# Patient Record
Sex: FEMALE | Race: WHITE | NOT HISPANIC OR LATINO | Employment: OTHER | URBAN - METROPOLITAN AREA
[De-identification: names, ages, dates, MRNs, and addresses within clinical notes are randomized per-mention and may not be internally consistent; named-entity substitution may affect disease eponyms.]

---

## 2023-10-16 PROBLEM — M19.011 PRIMARY OSTEOARTHRITIS OF RIGHT SHOULDER: Status: ACTIVE | Noted: 2023-10-16

## 2023-10-16 PROBLEM — R49.0 DYSPHONIA: Status: ACTIVE | Noted: 2023-10-16

## 2023-10-16 PROBLEM — I50.32 CHRONIC DIASTOLIC CONGESTIVE HEART FAILURE (MULTI): Status: ACTIVE | Noted: 2023-10-16

## 2023-10-16 PROBLEM — I10 BENIGN ESSENTIAL HTN: Status: ACTIVE | Noted: 2023-10-16

## 2023-10-16 PROBLEM — R29.898 WEAKNESS OF BOTH HIPS: Status: ACTIVE | Noted: 2023-10-16

## 2023-10-16 PROBLEM — E03.9 HYPOTHYROIDISM: Status: ACTIVE | Noted: 2023-10-16

## 2023-10-16 PROBLEM — M19.019 LOCALIZED, PRIMARY OSTEOARTHRITIS OF SHOULDER REGION: Status: ACTIVE | Noted: 2023-10-16

## 2023-10-16 PROBLEM — M75.101 ROTATOR CUFF TEAR ARTHROPATHY OF RIGHT SHOULDER: Status: ACTIVE | Noted: 2023-10-16

## 2023-10-16 PROBLEM — H57.9 EYE PROBLEM: Status: ACTIVE | Noted: 2023-10-16

## 2023-10-16 PROBLEM — M12.811 ROTATOR CUFF TEAR ARTHROPATHY OF RIGHT SHOULDER: Status: ACTIVE | Noted: 2023-10-16

## 2023-10-16 PROBLEM — I05.0 MITRAL VALVE STENOSIS: Status: ACTIVE | Noted: 2023-10-16

## 2023-10-16 PROBLEM — I27.20 PULMONARY HYPERTENSION (MULTI): Status: ACTIVE | Noted: 2023-10-16

## 2023-10-16 PROBLEM — J44.9 CHRONIC OBSTRUCTIVE PULMONARY DISEASE (MULTI): Status: ACTIVE | Noted: 2023-10-16

## 2023-10-16 PROBLEM — Z95.2 S/P TAVR (TRANSCATHETER AORTIC VALVE REPLACEMENT): Status: ACTIVE | Noted: 2023-10-16

## 2023-10-16 PROBLEM — I51.9 HEART DISEASE: Status: ACTIVE | Noted: 2023-10-16

## 2023-10-16 PROBLEM — Z87.891 FORMER SMOKER: Status: ACTIVE | Noted: 2023-10-16

## 2023-10-16 PROBLEM — I35.0 AORTIC VALVE STENOSIS, SEVERE: Status: ACTIVE | Noted: 2023-10-16

## 2023-10-16 PROBLEM — J45.909 ASTHMA (HHS-HCC): Status: ACTIVE | Noted: 2023-10-16

## 2023-10-16 PROBLEM — M19.90 GENERALIZED ARTHRITIS: Status: ACTIVE | Noted: 2023-10-16

## 2023-10-16 PROBLEM — E78.2 MIXED HYPERLIPIDEMIA: Status: ACTIVE | Noted: 2023-10-16

## 2023-10-16 PROBLEM — M97.9XXD: Status: ACTIVE | Noted: 2023-10-16

## 2023-10-16 PROBLEM — I25.10 CAD (CORONARY ARTERY DISEASE): Status: ACTIVE | Noted: 2023-10-16

## 2023-10-16 PROBLEM — R26.89 BALANCE PROBLEM: Status: ACTIVE | Noted: 2023-10-16

## 2023-10-16 RX ORDER — NITROGLYCERIN 0.4 MG/1
1 TABLET SUBLINGUAL EVERY 5 MIN PRN
COMMUNITY
Start: 2015-10-01

## 2023-10-16 RX ORDER — MECLIZINE HCL 12.5 MG 12.5 MG/1
1 TABLET ORAL 3 TIMES DAILY PRN
COMMUNITY

## 2023-10-16 RX ORDER — ALBUTEROL SULFATE 90 UG/1
AEROSOL, METERED RESPIRATORY (INHALATION)
COMMUNITY
Start: 2022-11-14 | End: 2024-04-16 | Stop reason: ENTERED-IN-ERROR

## 2023-10-16 RX ORDER — NAPROXEN SODIUM 220 MG/1
1 TABLET, FILM COATED ORAL DAILY
COMMUNITY
End: 2024-04-24 | Stop reason: HOSPADM

## 2023-10-16 RX ORDER — LOSARTAN POTASSIUM 50 MG/1
1 TABLET ORAL NIGHTLY
COMMUNITY
Start: 2022-09-13

## 2023-10-16 RX ORDER — METHOCARBAMOL 500 MG/1
TABLET, FILM COATED ORAL
COMMUNITY
Start: 2022-03-10 | End: 2024-04-16 | Stop reason: ENTERED-IN-ERROR

## 2023-10-16 RX ORDER — POLYETHYLENE GLYCOL 3350 17 G/17G
17 POWDER, FOR SOLUTION ORAL DAILY PRN
COMMUNITY

## 2023-10-16 RX ORDER — HYDROXYCHLOROQUINE SULFATE 200 MG/1
1 TABLET, FILM COATED ORAL DAILY
COMMUNITY
Start: 2015-10-01 | End: 2024-04-16 | Stop reason: ENTERED-IN-ERROR

## 2023-10-16 RX ORDER — BISACODYL 5 MG
1 TABLET, DELAYED RELEASE (ENTERIC COATED) ORAL AS NEEDED
COMMUNITY
Start: 2022-03-10

## 2023-10-16 RX ORDER — LEVOTHYROXINE SODIUM 75 UG/1
1 TABLET ORAL DAILY
COMMUNITY
End: 2024-04-16 | Stop reason: ENTERED-IN-ERROR

## 2023-10-16 RX ORDER — PRIMIDONE 50 MG/1
TABLET ORAL
COMMUNITY
Start: 2015-10-01 | End: 2024-04-16 | Stop reason: ENTERED-IN-ERROR

## 2023-10-16 RX ORDER — FLUTICASONE FUROATE AND VILANTEROL 100; 25 UG/1; UG/1
1 POWDER RESPIRATORY (INHALATION) DAILY
COMMUNITY
End: 2024-04-16 | Stop reason: ENTERED-IN-ERROR

## 2023-10-16 RX ORDER — MULTIVIT-MIN/IRON/FOLIC ACID/K 18-600-40
1 CAPSULE ORAL DAILY
COMMUNITY
End: 2024-04-19 | Stop reason: ENTERED-IN-ERROR

## 2023-10-16 RX ORDER — ATORVASTATIN CALCIUM 20 MG/1
1 TABLET, FILM COATED ORAL NIGHTLY
COMMUNITY
Start: 2015-10-01

## 2023-10-18 ENCOUNTER — CLINICAL SUPPORT (OUTPATIENT)
Dept: ORTHOPEDIC SURGERY | Facility: CLINIC | Age: 88
End: 2023-10-18
Payer: MEDICARE

## 2023-10-18 ENCOUNTER — OFFICE VISIT (OUTPATIENT)
Dept: ORTHOPEDIC SURGERY | Facility: CLINIC | Age: 88
End: 2023-10-18
Payer: MEDICARE

## 2023-10-18 VITALS — WEIGHT: 120 LBS | BODY MASS INDEX: 21.26 KG/M2 | HEIGHT: 63 IN

## 2023-10-18 DIAGNOSIS — M19.019 LOCALIZED, PRIMARY OSTEOARTHRITIS OF SHOULDER REGION, UNSPECIFIED LATERALITY: ICD-10-CM

## 2023-10-18 DIAGNOSIS — M19.019 LOCALIZED, PRIMARY OSTEOARTHRITIS OF SHOULDER REGION, UNSPECIFIED LATERALITY: Primary | ICD-10-CM

## 2023-10-18 PROCEDURE — 1036F TOBACCO NON-USER: CPT | Performed by: ORTHOPAEDIC SURGERY

## 2023-10-18 PROCEDURE — 99214 OFFICE O/P EST MOD 30 MIN: CPT | Performed by: ORTHOPAEDIC SURGERY

## 2023-10-18 PROCEDURE — 73030 X-RAY EXAM OF SHOULDER: CPT | Mod: BILATERAL PROCEDURE | Performed by: ORTHOPAEDIC SURGERY

## 2023-10-18 PROCEDURE — 1159F MED LIST DOCD IN RCRD: CPT | Performed by: ORTHOPAEDIC SURGERY

## 2023-10-18 PROCEDURE — 1125F AMNT PAIN NOTED PAIN PRSNT: CPT | Performed by: ORTHOPAEDIC SURGERY

## 2023-10-18 PROCEDURE — 20610 DRAIN/INJ JOINT/BURSA W/O US: CPT | Performed by: ORTHOPAEDIC SURGERY

## 2023-10-18 ASSESSMENT — PAIN SCALES - GENERAL: PAINLEVEL_OUTOF10: 6

## 2023-10-18 ASSESSMENT — PAIN - FUNCTIONAL ASSESSMENT: PAIN_FUNCTIONAL_ASSESSMENT: 0-10

## 2023-10-18 NOTE — PROGRESS NOTES
History of present: Patient is well-known to our practice we have not seen her for her shoulders for at least 3 to 4 years she says she has not had injections in any recent memory so we got new x-rays    She is got multiple arthritic changes she is a nonsurgical candidate        Past medical history:    The patient's past medical history, family history, social history and review of systems were documented on the patient's medical intake form.  The medical intake form was reviewed and scanned into the electronic medical record for future use.  History is otherwise negative except as stated in the history of present illness.    Physical exam:    General: Alert and oriented to person place and time.  No acute distress and breathing comfortably, pleasant and cooperative with examination.    Head and neck exam: Head is normocephalic atraumatic.    Neck: Supple no visible swelling or deformity.    Cardiovascular: Good perfusion to affected extremities without signs or symptoms of chest pain.    Lungs no audible wheezing or labored breathing on examination.    Abdominal exam: Nondistended nontender    Extremities: The right shoulder was inspected and was found to have no obvious deformity.  There was tenderness to touch over the lateral edges of the shoulder over the rotator cuff insertion.  Active forward flexion 120 degrees, external rotation to 60 degrees, abduction to 50 degrees, and internal rotation to the level of L2.    At this time the shoulder is neurovascular tact and neurosensory intact.  Motor intact C5-T1.  There was no obvious neck pain or radiculopathy noted.  There was no gross instability or adhesive capsulitis symptoms.  There was no evidence of apprehension or apprehension suppression.    Strength was tested in 4 planes with weakness in the supraspinatus strength testing and external rotation position.  There was no strength deficit in internal rotation.  Impingement signs were positive both supine and  standing for impingement test type I and II.  There was mild pain over the bicipital groove with a positive speeds sign    Before aspiration injection the benefits of a cortisone injection including infection, local skin irritation, skin atrophy, calcification, continued pain and discomfort, elevated blood sugar, burning, failure to relieve pain, possible late infection were discussed with the patient.    Postprocedure discomfort can be alleviated with additional medications, ice, elevation, rest over the first 24 hours as recommended.    Patient verbalized understanding and wanted to proceed with the planned procedure.    After informed consent was provided and allergies verified, the patient was positioned appropriately on thel bed.  The right shoulder to be aspirated and injected was prepped and draped in a sterile fashion.  The skin was anesthetized with ethyl chloride spray.  A joint aspiration was to be performed an 18-gauge needle was used otherwise a 22-gauge needle was used to inject the appropriate joint.    Joint injection was performed with a mixture of 5 cc 1% lidocaine plain and 2 cc Celestone Soluspan 6 mg per mL.  The needle was removed and the puncture site closed and sealed with a Band-Aid.  The patient tolerated the procedure well.      The left shoulder was inspected and was found to have no obvious deformity.  There was tenderness to touch over the lateral edges of the shoulder over the rotator cuff insertion.  Active forward flexion 120 degrees, external rotation to 60 degrees, abduction to 45 degrees, and internal rotation to the level of L2.    At this time the shoulder is neurovascular tact and neurosensory intact.  Motor intact C5-T1.  There was no obvious neck pain or radiculopathy noted.  There was no gross instability or adhesive capsulitis symptoms.  There was no evidence of apprehension or apprehension suppression.    Strength was tested in 4 planes with weakness in the supraspinatus  strength testing and external rotation position.  There was no strength deficit in internal rotation.  Impingement signs were positive both supine and standing for impingement test type I and II.  There was mild pain over the bicipital groove with a positive speeds sign    Before aspiration injection the benefits of a cortisone injection including infection, local skin irritation, skin atrophy, calcification, continued pain and discomfort, elevated blood sugar, burning, failure to relieve pain, possible late infection were discussed with the patient.    Postprocedure discomfort can be alleviated with additional medications, ice, elevation, rest over the first 24 hours as recommended.    Patient verbalized understanding and wanted to proceed with the planned procedure.    After informed consent was provided and allergies verified, the patient was positioned appropriately on thel bed.  The left shoulder to be aspirated and injected was prepped and draped in a sterile fashion.  The skin was anesthetized with ethyl chloride spray.  A joint aspiration was to be performed an 18-gauge needle was used otherwise a 22-gauge needle was used to inject the appropriate joint.    Joint injection was performed with a mixture of 5 cc 1% lidocaine plain and 2 cc Celestone Soluspan 6 mg per mL.  The needle was removed and the puncture site closed and sealed with a Band-Aid.  The patient tolerated the procedure well.      Diagnostic studies: Bilateral rotator cuff arthropathy seen on x-rays      Impression: Bilateral rotator cuff arthropathy      Plan: Simple injections    Moist heat ice what ever makes her comfortable    Avoid heavy lifting    When she does lift ergonomically she should keep her elbows up against her rib cage    Fortunately she can still do ADLs she does not get too much anterior escape the right is a little worse than left but both shoulders respond nicely to injections we will see her back as needed

## 2024-04-16 ENCOUNTER — APPOINTMENT (OUTPATIENT)
Dept: RADIOLOGY | Facility: HOSPITAL | Age: 89
DRG: 536 | End: 2024-04-16
Payer: MEDICARE

## 2024-04-16 ENCOUNTER — APPOINTMENT (OUTPATIENT)
Dept: CARDIOLOGY | Facility: HOSPITAL | Age: 89
DRG: 536 | End: 2024-04-16
Payer: MEDICARE

## 2024-04-16 ENCOUNTER — HOSPITAL ENCOUNTER (INPATIENT)
Facility: HOSPITAL | Age: 89
LOS: 1 days | Discharge: NURSING, REHAB OR LONG-TERM CARE | DRG: 536 | End: 2024-04-17
Attending: STUDENT IN AN ORGANIZED HEALTH CARE EDUCATION/TRAINING PROGRAM | Admitting: HOSPITALIST
Payer: MEDICARE

## 2024-04-16 DIAGNOSIS — Z96.649 PERIPROSTHETIC FRACTURE OF HIP, INITIAL ENCOUNTER: ICD-10-CM
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
## 2024-04-16 DIAGNOSIS — M97.8XXA PERIPROSTHETIC FRACTURE OF HIP, INITIAL ENCOUNTER: ICD-10-CM

## 2024-04-16 LAB
ALBUMIN SERPL BCP-MCNC: 3.7 G/DL (ref 3.4–5)
ALP SERPL-CCNC: 52 U/L (ref 33–136)
ALT SERPL W P-5'-P-CCNC: 14 U/L (ref 7–45)
ANION GAP SERPL CALC-SCNC: 10 MMOL/L (ref 10–20)
APTT PPP: 32 SECONDS (ref 27–38)
AST SERPL W P-5'-P-CCNC: 18 U/L (ref 9–39)
ATRIAL RATE: 70 BPM
BASOPHILS # BLD AUTO: 0.03 X10*3/UL (ref 0–0.1)
BASOPHILS NFR BLD AUTO: 0.3 %
BILIRUB SERPL-MCNC: 0.6 MG/DL (ref 0–1.2)
BUN SERPL-MCNC: 28 MG/DL (ref 6–23)
CALCIUM SERPL-MCNC: 8.9 MG/DL (ref 8.6–10.3)
CARDIAC TROPONIN I PNL SERPL HS: 8 NG/L (ref 0–13)
CHLORIDE SERPL-SCNC: 106 MMOL/L (ref 98–107)
CO2 SERPL-SCNC: 27 MMOL/L (ref 21–32)
CREAT SERPL-MCNC: 0.82 MG/DL (ref 0.5–1.05)
EGFRCR SERPLBLD CKD-EPI 2021: 66 ML/MIN/1.73M*2
EOSINOPHIL # BLD AUTO: 0.13 X10*3/UL (ref 0–0.4)
EOSINOPHIL NFR BLD AUTO: 1.4 %
ERYTHROCYTE [DISTWIDTH] IN BLOOD BY AUTOMATED COUNT: 13.7 % (ref 11.5–14.5)
GLUCOSE SERPL-MCNC: 112 MG/DL (ref 74–99)
HCT VFR BLD AUTO: 31.2 % (ref 36–46)
HGB BLD-MCNC: 10 G/DL (ref 12–16)
IMM GRANULOCYTES # BLD AUTO: 0.05 X10*3/UL (ref 0–0.5)
IMM GRANULOCYTES NFR BLD AUTO: 0.5 % (ref 0–0.9)
INR PPP: 1 (ref 0.9–1.1)
LYMPHOCYTES # BLD AUTO: 0.73 X10*3/UL (ref 0.8–3)
LYMPHOCYTES NFR BLD AUTO: 7.8 %
MCH RBC QN AUTO: 27.4 PG (ref 26–34)
MCHC RBC AUTO-ENTMCNC: 32.1 G/DL (ref 32–36)
MCV RBC AUTO: 86 FL (ref 80–100)
MONOCYTES # BLD AUTO: 0.55 X10*3/UL (ref 0.05–0.8)
MONOCYTES NFR BLD AUTO: 5.9 %
NEUTROPHILS # BLD AUTO: 7.88 X10*3/UL (ref 1.6–5.5)
NEUTROPHILS NFR BLD AUTO: 84.1 %
NRBC BLD-RTO: 0 /100 WBCS (ref 0–0)
P OFFSET: 209 MS
P ONSET: 160 MS
PLATELET # BLD AUTO: 176 X10*3/UL (ref 150–450)
POTASSIUM SERPL-SCNC: 4.1 MMOL/L (ref 3.5–5.3)
PR INTERVAL: 130 MS
PROT SERPL-MCNC: 6.1 G/DL (ref 6.4–8.2)
PROTHROMBIN TIME: 11.4 SECONDS (ref 9.8–12.8)
Q ONSET: 225 MS
QRS COUNT: 11 BEATS
QRS DURATION: 76 MS
QT INTERVAL: 378 MS
QTC CALCULATION(BAZETT): 408 MS
QTC FREDERICIA: 398 MS
R AXIS: -23 DEGREES
RBC # BLD AUTO: 3.65 X10*6/UL (ref 4–5.2)
SODIUM SERPL-SCNC: 139 MMOL/L (ref 136–145)
T AXIS: 8 DEGREES
T OFFSET: 414 MS
VENTRICULAR RATE: 70 BPM
WBC # BLD AUTO: 9.4 X10*3/UL (ref 4.4–11.3)

## 2024-04-16 PROCEDURE — 72125 CT NECK SPINE W/O DYE: CPT

## 2024-04-16 PROCEDURE — 93005 ELECTROCARDIOGRAM TRACING: CPT

## 2024-04-16 PROCEDURE — 73552 X-RAY EXAM OF FEMUR 2/>: CPT | Performed by: RADIOLOGY

## 2024-04-16 PROCEDURE — 72125 CT NECK SPINE W/O DYE: CPT | Performed by: RADIOLOGY

## 2024-04-16 PROCEDURE — 72131 CT LUMBAR SPINE W/O DYE: CPT | Mod: RCN

## 2024-04-16 PROCEDURE — 80053 COMPREHEN METABOLIC PANEL: CPT | Performed by: STUDENT IN AN ORGANIZED HEALTH CARE EDUCATION/TRAINING PROGRAM

## 2024-04-16 PROCEDURE — 72170 X-RAY EXAM OF PELVIS: CPT | Performed by: RADIOLOGY

## 2024-04-16 PROCEDURE — 99221 1ST HOSP IP/OBS SF/LOW 40: CPT | Performed by: ORTHOPAEDIC SURGERY

## 2024-04-16 PROCEDURE — 73552 X-RAY EXAM OF FEMUR 2/>: CPT | Mod: RT

## 2024-04-16 PROCEDURE — 27246 TREAT THIGH FRACTURE: CPT | Performed by: ORTHOPAEDIC SURGERY

## 2024-04-16 PROCEDURE — 72170 X-RAY EXAM OF PELVIS: CPT

## 2024-04-16 PROCEDURE — 2060000001 HC INTERMEDIATE ICU ROOM DAILY

## 2024-04-16 PROCEDURE — 2500000004 HC RX 250 GENERAL PHARMACY W/ HCPCS (ALT 636 FOR OP/ED)

## 2024-04-16 PROCEDURE — 99285 EMERGENCY DEPT VISIT HI MDM: CPT | Mod: 25

## 2024-04-16 PROCEDURE — 73700 CT LOWER EXTREMITY W/O DYE: CPT | Mod: RT

## 2024-04-16 PROCEDURE — 84484 ASSAY OF TROPONIN QUANT: CPT | Performed by: STUDENT IN AN ORGANIZED HEALTH CARE EDUCATION/TRAINING PROGRAM

## 2024-04-16 PROCEDURE — 70450 CT HEAD/BRAIN W/O DYE: CPT | Performed by: RADIOLOGY

## 2024-04-16 PROCEDURE — 85730 THROMBOPLASTIN TIME PARTIAL: CPT | Performed by: STUDENT IN AN ORGANIZED HEALTH CARE EDUCATION/TRAINING PROGRAM

## 2024-04-16 PROCEDURE — 85025 COMPLETE CBC W/AUTO DIFF WBC: CPT | Performed by: STUDENT IN AN ORGANIZED HEALTH CARE EDUCATION/TRAINING PROGRAM

## 2024-04-16 PROCEDURE — 72128 CT CHEST SPINE W/O DYE: CPT | Mod: RCN

## 2024-04-16 PROCEDURE — 2500000001 HC RX 250 WO HCPCS SELF ADMINISTERED DRUGS (ALT 637 FOR MEDICARE OP): Performed by: NURSE PRACTITIONER

## 2024-04-16 PROCEDURE — 2550000001 HC RX 255 CONTRASTS: Performed by: STUDENT IN AN ORGANIZED HEALTH CARE EDUCATION/TRAINING PROGRAM

## 2024-04-16 PROCEDURE — 36415 COLL VENOUS BLD VENIPUNCTURE: CPT | Performed by: STUDENT IN AN ORGANIZED HEALTH CARE EDUCATION/TRAINING PROGRAM

## 2024-04-16 PROCEDURE — 99222 1ST HOSP IP/OBS MODERATE 55: CPT

## 2024-04-16 PROCEDURE — 85610 PROTHROMBIN TIME: CPT | Performed by: STUDENT IN AN ORGANIZED HEALTH CARE EDUCATION/TRAINING PROGRAM

## 2024-04-16 PROCEDURE — 74177 CT ABD & PELVIS W/CONTRAST: CPT

## 2024-04-16 PROCEDURE — 70450 CT HEAD/BRAIN W/O DYE: CPT

## 2024-04-16 RX ORDER — MECLIZINE HCL 12.5 MG 12.5 MG/1
12.5 TABLET ORAL 2 TIMES DAILY PRN
Status: DISCONTINUED | OUTPATIENT
Start: 2024-04-16 | End: 2024-04-17 | Stop reason: HOSPADM

## 2024-04-16 RX ORDER — ACETAMINOPHEN 160 MG/5ML
650 SOLUTION ORAL EVERY 4 HOURS PRN
Status: DISCONTINUED | OUTPATIENT
Start: 2024-04-16 | End: 2024-04-17 | Stop reason: HOSPADM

## 2024-04-16 RX ORDER — POLYETHYLENE GLYCOL 3350 17 G/17G
17 POWDER, FOR SOLUTION ORAL DAILY
Status: DISCONTINUED | OUTPATIENT
Start: 2024-04-16 | End: 2024-04-17 | Stop reason: HOSPADM

## 2024-04-16 RX ORDER — ACETAMINOPHEN 650 MG/1
650 SUPPOSITORY RECTAL EVERY 4 HOURS PRN
Status: DISCONTINUED | OUTPATIENT
Start: 2024-04-16 | End: 2024-04-17 | Stop reason: HOSPADM

## 2024-04-16 RX ORDER — ONDANSETRON HYDROCHLORIDE 2 MG/ML
4 INJECTION, SOLUTION INTRAVENOUS EVERY 8 HOURS PRN
Status: DISCONTINUED | OUTPATIENT
Start: 2024-04-16 | End: 2024-04-17 | Stop reason: HOSPADM

## 2024-04-16 RX ORDER — ONDANSETRON 4 MG/1
4 TABLET, FILM COATED ORAL EVERY 8 HOURS PRN
Status: DISCONTINUED | OUTPATIENT
Start: 2024-04-16 | End: 2024-04-17 | Stop reason: HOSPADM

## 2024-04-16 RX ORDER — FLUTICASONE FUROATE AND VILANTEROL 100; 25 UG/1; UG/1
1 POWDER RESPIRATORY (INHALATION)
Status: DISCONTINUED | OUTPATIENT
Start: 2024-04-17 | End: 2024-04-17 | Stop reason: HOSPADM

## 2024-04-16 RX ORDER — ATORVASTATIN CALCIUM 20 MG/1
20 TABLET, FILM COATED ORAL NIGHTLY
Status: DISCONTINUED | OUTPATIENT
Start: 2024-04-16 | End: 2024-04-17 | Stop reason: HOSPADM

## 2024-04-16 RX ORDER — LOSARTAN POTASSIUM 50 MG/1
50 TABLET ORAL NIGHTLY
Status: DISCONTINUED | OUTPATIENT
Start: 2024-04-16 | End: 2024-04-17 | Stop reason: HOSPADM

## 2024-04-16 RX ORDER — ACETAMINOPHEN 325 MG/1
975 TABLET ORAL ONCE
Status: COMPLETED | OUTPATIENT
Start: 2024-04-16 | End: 2024-04-16

## 2024-04-16 RX ORDER — ACETAMINOPHEN 325 MG/1
650 TABLET ORAL EVERY 4 HOURS PRN
Status: DISCONTINUED | OUTPATIENT
Start: 2024-04-16 | End: 2024-04-17 | Stop reason: HOSPADM

## 2024-04-16 RX ORDER — ENOXAPARIN SODIUM 100 MG/ML
40 INJECTION SUBCUTANEOUS EVERY 24 HOURS
Status: DISCONTINUED | OUTPATIENT
Start: 2024-04-16 | End: 2024-04-17

## 2024-04-16 RX ADMIN — IOHEXOL 75 ML: 350 INJECTION, SOLUTION INTRAVENOUS at 10:00

## 2024-04-16 RX ADMIN — ATORVASTATIN CALCIUM 20 MG: 20 TABLET, FILM COATED ORAL at 22:06

## 2024-04-16 RX ADMIN — POLYETHYLENE GLYCOL 3350 17 G: 17 POWDER, FOR SOLUTION ORAL at 20:28

## 2024-04-16 RX ADMIN — ACETAMINOPHEN 650 MG: 325 TABLET ORAL at 18:53

## 2024-04-16 RX ADMIN — LOSARTAN POTASSIUM 50 MG: 50 TABLET, FILM COATED ORAL at 22:06

## 2024-04-16 RX ADMIN — ACETAMINOPHEN 975 MG: 325 TABLET ORAL at 10:32

## 2024-04-16 RX ADMIN — ENOXAPARIN SODIUM 40 MG: 40 INJECTION SUBCUTANEOUS at 20:27

## 2024-04-16 SDOH — SOCIAL STABILITY: SOCIAL INSECURITY: HAS ANYONE EVER THREATENED TO HURT YOUR FAMILY OR YOUR PETS?: NO

## 2024-04-16 SDOH — SOCIAL STABILITY: SOCIAL INSECURITY: DO YOU FEEL ANYONE HAS EXPLOITED OR TAKEN ADVANTAGE OF YOU FINANCIALLY OR OF YOUR PERSONAL PROPERTY?: NO

## 2024-04-16 SDOH — SOCIAL STABILITY: SOCIAL INSECURITY: WERE YOU ABLE TO COMPLETE ALL THE BEHAVIORAL HEALTH SCREENINGS?: YES

## 2024-04-16 SDOH — SOCIAL STABILITY: SOCIAL INSECURITY: ARE THERE ANY APPARENT SIGNS OF INJURIES/BEHAVIORS THAT COULD BE RELATED TO ABUSE/NEGLECT?: NO

## 2024-04-16 SDOH — SOCIAL STABILITY: SOCIAL INSECURITY: HAVE YOU HAD THOUGHTS OF HARMING ANYONE ELSE?: NO

## 2024-04-16 SDOH — SOCIAL STABILITY: SOCIAL INSECURITY: ARE YOU OR HAVE YOU BEEN THREATENED OR ABUSED PHYSICALLY, EMOTIONALLY, OR SEXUALLY BY ANYONE?: NO

## 2024-04-16 SDOH — SOCIAL STABILITY: SOCIAL INSECURITY: DOES ANYONE TRY TO KEEP YOU FROM HAVING/CONTACTING OTHER FRIENDS OR DOING THINGS OUTSIDE YOUR HOME?: NO

## 2024-04-16 SDOH — SOCIAL STABILITY: SOCIAL INSECURITY: ABUSE: ADULT

## 2024-04-16 SDOH — SOCIAL STABILITY: SOCIAL INSECURITY: DO YOU FEEL UNSAFE GOING BACK TO THE PLACE WHERE YOU ARE LIVING?: NO

## 2024-04-16 SDOH — SOCIAL STABILITY: SOCIAL INSECURITY: HAVE YOU HAD ANY THOUGHTS OF HARMING ANYONE ELSE?: NO

## 2024-04-16 ASSESSMENT — PAIN - FUNCTIONAL ASSESSMENT
PAIN_FUNCTIONAL_ASSESSMENT: 0-10

## 2024-04-16 ASSESSMENT — ACTIVITIES OF DAILY LIVING (ADL)
TOILETING: INDEPENDENT
WALKS IN HOME: NEEDS ASSISTANCE
LACK_OF_TRANSPORTATION: NO
ADEQUATE_TO_COMPLETE_ADL: YES
BATHING: INDEPENDENT
GROOMING: INDEPENDENT
FEEDING YOURSELF: INDEPENDENT
JUDGMENT_ADEQUATE_SAFELY_COMPLETE_DAILY_ACTIVITIES: YES
DRESSING YOURSELF: INDEPENDENT
HEARING - LEFT EAR: HEARING AID
PATIENT'S MEMORY ADEQUATE TO SAFELY COMPLETE DAILY ACTIVITIES?: YES
HEARING - RIGHT EAR: HEARING AID

## 2024-04-16 ASSESSMENT — COGNITIVE AND FUNCTIONAL STATUS - GENERAL
MOBILITY SCORE: 12
MOVING TO AND FROM BED TO CHAIR: A LOT
MOVING FROM LYING ON BACK TO SITTING ON SIDE OF FLAT BED WITH BEDRAILS: A LOT
MOBILITY SCORE: 12
WALKING IN HOSPITAL ROOM: A LOT
PATIENT BASELINE BEDBOUND: NO
DAILY ACTIVITIY SCORE: 17
DRESSING REGULAR LOWER BODY CLOTHING: A LOT
DAILY ACTIVITIY SCORE: 17
STANDING UP FROM CHAIR USING ARMS: A LOT
PERSONAL GROOMING: A LITTLE
DRESSING REGULAR LOWER BODY CLOTHING: A LOT
DRESSING REGULAR UPPER BODY CLOTHING: A LITTLE
CLIMB 3 TO 5 STEPS WITH RAILING: A LOT
PERSONAL GROOMING: A LITTLE
MOVING FROM LYING ON BACK TO SITTING ON SIDE OF FLAT BED WITH BEDRAILS: A LOT
CLIMB 3 TO 5 STEPS WITH RAILING: A LOT
TURNING FROM BACK TO SIDE WHILE IN FLAT BAD: A LOT
TURNING FROM BACK TO SIDE WHILE IN FLAT BAD: A LOT
MOVING TO AND FROM BED TO CHAIR: A LOT
STANDING UP FROM CHAIR USING ARMS: A LOT
TOILETING: A LOT
TOILETING: A LOT
DRESSING REGULAR UPPER BODY CLOTHING: A LITTLE
WALKING IN HOSPITAL ROOM: A LOT
HELP NEEDED FOR BATHING: A LITTLE
HELP NEEDED FOR BATHING: A LITTLE

## 2024-04-16 ASSESSMENT — PATIENT HEALTH QUESTIONNAIRE - PHQ9
1. LITTLE INTEREST OR PLEASURE IN DOING THINGS: NOT AT ALL
SUM OF ALL RESPONSES TO PHQ9 QUESTIONS 1 & 2: 0
2. FEELING DOWN, DEPRESSED OR HOPELESS: NOT AT ALL

## 2024-04-16 ASSESSMENT — PAIN DESCRIPTION - LOCATION
LOCATION: LEG
LOCATION: LEG

## 2024-04-16 ASSESSMENT — ENCOUNTER SYMPTOMS
WEAKNESS: 0
HEMATURIA: 0
COLOR CHANGE: 0
NECK PAIN: 0
VOMITING: 0
BACK PAIN: 0
ABDOMINAL PAIN: 0
CHEST TIGHTNESS: 0
NAUSEA: 0
DIZZINESS: 0
ARTHRALGIAS: 1
COUGH: 0
SHORTNESS OF BREATH: 0
CHILLS: 0
NUMBNESS: 0
DIARRHEA: 0
FEVER: 0
SORE THROAT: 0
DYSURIA: 0

## 2024-04-16 ASSESSMENT — PAIN DESCRIPTION - ORIENTATION: ORIENTATION: RIGHT

## 2024-04-16 ASSESSMENT — PAIN SCALES - GENERAL
PAINLEVEL_OUTOF10: 2
PAINLEVEL_OUTOF10: 7
PAINLEVEL_OUTOF10: 3
PAINLEVEL_OUTOF10: 9

## 2024-04-16 ASSESSMENT — LIFESTYLE VARIABLES
AUDIT-C TOTAL SCORE: 2
AUDIT-C TOTAL SCORE: 2
HOW MANY STANDARD DRINKS CONTAINING ALCOHOL DO YOU HAVE ON A TYPICAL DAY: 1 OR 2
HAVE PEOPLE ANNOYED YOU BY CRITICIZING YOUR DRINKING: NO
HOW OFTEN DO YOU HAVE 6 OR MORE DRINKS ON ONE OCCASION: NEVER
HOW OFTEN DO YOU HAVE A DRINK CONTAINING ALCOHOL: 2-4 TIMES A MONTH
SKIP TO QUESTIONS 9-10: 1
EVER HAD A DRINK FIRST THING IN THE MORNING TO STEADY YOUR NERVES TO GET RID OF A HANGOVER: NO
HAVE YOU EVER FELT YOU SHOULD CUT DOWN ON YOUR DRINKING: NO
TOTAL SCORE: 0
EVER FELT BAD OR GUILTY ABOUT YOUR DRINKING: NO

## 2024-04-16 NOTE — CARE PLAN
Problem: Skin  Goal: Decreased wound size/increased tissue granulation at next dressing change  Outcome: Progressing     Problem: Skin  Goal: Participates in plan/prevention/treatment measures  Outcome: Progressing     Problem: Skin  Goal: Prevent/manage excess moisture  Outcome: Progressing     Problem: Skin  Goal: Prevent/minimize sheer/friction injuries  Outcome: Progressing     Problem: Skin  Goal: Promote/optimize nutrition  Outcome: Progressing     Problem: Skin  Goal: Promote skin healing  Outcome: Progressing   The patient's goals for the shift include      The clinical goals for the shift include tolerable pain

## 2024-04-16 NOTE — CARE PLAN
Problem: Pain  Goal: Takes deep breaths with improved pain control throughout the shift  Outcome: Progressing  Goal: Turns in bed with improved pain control throughout the shift  Outcome: Progressing  Goal: Walks with improved pain control throughout the shift  Outcome: Progressing  Goal: Performs ADL's with improved pain control throughout shift  Outcome: Progressing  Goal: Participates in PT with improved pain control throughout the shift  Outcome: Progressing  Goal: Free from opioid side effects throughout the shift  Outcome: Progressing  Goal: Free from acute confusion related to pain meds throughout the shift  Outcome: Progressing     Problem: Renal Failure  Goal: I will maintain optimum fluid volume with treatment  Outcome: Progressing     Problem: Renal Failure  Goal: I will maintain optimum fluid volume with treatment  Outcome: Progressing   The patient's goals for the shift include      The clinical goals for the shift include tolerable pain

## 2024-04-16 NOTE — ED PROVIDER NOTES
HPI   Chief Complaint   Patient presents with    Fall     Pt coming in from home. On ground for 1hour. Pt has right leg pain. Denies loc          89-year-old female brought in by EMS from home for evaluation after fall.  Patient reports she got up to go to the bathroom around 6 AM this morning and thinks she may have lost her footing and tripped causing her to fall forward.  Patient hit her head on a dresser, denies LOC.  No blood thinner use.  Patient complains of right thigh and hip pain.  Patient has skin tears to the right elbow.  Patient lives at home by herself with her dog.  Patient was able to crawl to a phone and call family for help.      History provided by:  Patient and EMS personnel                      Abimael Coma Scale Score: 15                     Patient History   Past Medical History:   Diagnosis Date    Hypothyroidism, unspecified 01/15/2016    Hypothyroidism    Personal history of diseases of the blood and blood-forming organs and certain disorders involving the immune mechanism 11/13/2015    History of anemia    Personal history of other diseases of the circulatory system 01/15/2016    History of coronary atherosclerosis    Personal history of other diseases of the circulatory system 01/15/2016    History of hypertension    Personal history of other diseases of the musculoskeletal system and connective tissue 11/13/2015    History of rheumatoid arthritis    Pulmonary hypertension, unspecified (Multi) 01/15/2016    Pulmonary hypertension     Past Surgical History:   Procedure Laterality Date    AORTIC VALVE REPLACEMENT  01/15/2016    Aortic Valve Replacement    CORONARY ANGIOPLASTY  01/15/2016    PTCA    CT ABDOMEN PELVIS ANGIOGRAM W AND/OR WO IV CONTRAST  11/6/2015    CT ABDOMEN PELVIS ANGIOGRAM W AND/OR WO IV CONTRAST 11/6/2015 [facility]    ESOPHAGOGASTRODUODENOSCOPY  01/15/2016    Diagnostic Esophagogastroduodenoscopy    HYSTERECTOMY  01/15/2016    Hysterectomy    KNEE SURGERY   01/15/2016    Knee Surgery Left    MR HEAD ANGIO WO IV CONTRAST  1/22/2023    MR HEAD ANGIO WO IV CONTRAST 1/22/2023 [facility]    MR NECK ANGIO WO IV CONTRAST  1/22/2023    MR NECK ANGIO WO IV CONTRAST 1/22/2023 [facility]    OTHER SURGICAL HISTORY  05/19/2022    Appendectomy    OTHER SURGICAL HISTORY  05/19/2022    Cataract surgery    OTHER SURGICAL HISTORY  05/19/2022    Colonoscopy    OTHER SURGICAL HISTORY  05/19/2022    Hip replacement    OTHER SURGICAL HISTORY  05/19/2022    Tonsillectomy     Family History   Problem Relation Name Age of Onset    Hypertension Mother      Heart disease Father          RHEUMATIC    Hypertension Father      Coronary artery disease Father      Other (ENDOCARDITIS) Father       Social History     Tobacco Use    Smoking status: Former     Types: Cigarettes    Smokeless tobacco: Never   Substance Use Topics    Alcohol use: Not on file    Drug use: Not on file       Physical Exam   ED Triage Vitals [04/16/24 0716]   Temp Heart Rate Respirations BP   -- 86 18 172/75      Pulse Ox Temp src Heart Rate Source Patient Position   97 % -- Monitor --      BP Location FiO2 (%)     -- --       Physical Exam  Vitals and nursing note reviewed.   Constitutional:       General: She is not in acute distress.  HENT:      Head: Atraumatic.      Mouth/Throat:      Mouth: Mucous membranes are moist.      Pharynx: Oropharynx is clear.   Eyes:      Extraocular Movements: Extraocular movements intact.      Conjunctiva/sclera: Conjunctivae normal.      Pupils: Pupils are equal, round, and reactive to light.   Cardiovascular:      Rate and Rhythm: Normal rate and regular rhythm.      Pulses: Normal pulses.   Pulmonary:      Effort: Pulmonary effort is normal. No respiratory distress.      Breath sounds: Normal breath sounds.   Abdominal:      General: There is no distension.      Palpations: Abdomen is soft.      Tenderness: There is no abdominal tenderness. There is no guarding or  rebound.   Musculoskeletal:         General: No deformity.      Cervical back: Neck supple.   Skin:     General: Skin is warm and dry.      Comments: 3 separate linear skin tears right elbow   Neurological:      Mental Status: She is alert and oriented to person, place, and time. Mental status is at baseline.      Cranial Nerves: No cranial nerve deficit.      Sensory: No sensory deficit.      Motor: No weakness.   Psychiatric:         Mood and Affect: Mood normal.         Behavior: Behavior normal.         ED Course & MDM   Diagnoses as of 04/16/24 1259   Fall, initial encounter   Periprosthetic fracture of hip, initial encounter     Labs Reviewed   CBC WITH AUTO DIFFERENTIAL - Abnormal       Result Value    WBC 9.4      nRBC 0.0      RBC 3.65 (*)     Hemoglobin 10.0 (*)     Hematocrit 31.2 (*)     MCV 86      MCH 27.4      MCHC 32.1      RDW 13.7      Platelets 176      Neutrophils % 84.1      Immature Granulocytes %, Automated 0.5      Lymphocytes % 7.8      Monocytes % 5.9      Eosinophils % 1.4      Basophils % 0.3      Neutrophils Absolute 7.88 (*)     Immature Granulocytes Absolute, Automated 0.05      Lymphocytes Absolute 0.73 (*)     Monocytes Absolute 0.55      Eosinophils Absolute 0.13      Basophils Absolute 0.03     COMPREHENSIVE METABOLIC PANEL - Abnormal    Glucose 112 (*)     Sodium 139      Potassium 4.1      Chloride 106      Bicarbonate 27      Anion Gap 10      Urea Nitrogen 28 (*)     Creatinine 0.82      eGFR 66      Calcium 8.9      Albumin 3.7      Alkaline Phosphatase 52      Total Protein 6.1 (*)     AST 18      Bilirubin, Total 0.6      ALT 14     PROTIME-INR - Normal    Protime 11.4      INR 1.0     APTT - Normal    aPTT 32      Narrative:     The APTT is no longer used for monitoring Unfractionated Heparin Therapy. For monitoring Heparin Therapy, use the Heparin Assay.   TROPONIN I, HIGH SENSITIVITY - Normal    Troponin I, High Sensitivity 8      Narrative:     Less than 99th percentile  of normal range cutoff-  Female and children under 18 years old <14 ng/L; Male <21 ng/L: Negative  Repeat testing should be performed if clinically indicated.     Female and children under 18 years old 14-50 ng/L; Male 21-50 ng/L:  Consistent with possible cardiac damage and possible increased clinical   risk. Serial measurements may help to assess extent of myocardial damage.     >50 ng/L: Consistent with cardiac damage, increased clinical risk and  myocardial infarction. Serial measurements may help assess extent of   myocardial damage.      NOTE: Children less than 1 year old may have higher baseline troponin   levels and results should be interpreted in conjunction with the overall   clinical context.     NOTE: Troponin I testing is performed using a different   testing methodology at [facility] than at other   [facility]. Direct result comparisons should only   be made within the same method.     CT chest abdomen pelvis w IV contrast   Final Result   CHEST:        Overall limited motion compromised exam. Bandlike possible infiltrate   or atelectasis near the left main fissure. No sizable pleural   effusion or pneumothorax.        ABDOMEN AND PELVIS:        No definite evidence of solid organ injury or free fluid within   limits of the exam.        Heterogenous 8 cm left pelvic/adnexal mass without separate   visualization of the uterus. Further evaluation with ultrasound or   MRI recommended.        Small hypodense lesion in small/atrophic left kidney, possible cyst   although not fully characterized on single phase exam.        Other findings as described above.        CT THORACIC AND LUMBAR SPINE:        No thoracic or lumbar vertebral compression deformity or acute   displaced fracture.        Extensive multilevel degenerative changes with scoliosis and mild   multilevel spondylolisthesis, as detailed above.        MACRO:   None.        Signed by: Maren Victoria 4/16/2024 11:22 AM    Dictation workstation:   [id number]      CT head wo IV contrast   Final Result   No acute intracranial hemorrhage or mass-effect.        MACRO:   None.        Signed by: Maren Victoria 4/16/2024 10:53 AM   Dictation workstation:   [id number]      CT cervical spine wo IV contrast   Final Result   No acute cervical vertebral displacement or acute displaced fracture.   Alignment abnormalities and multilevel degenerative changes similar   to 03/08/2022.        Paraspinal/soft tissue findings as above.        MACRO:   None.        Signed by: Maren Victoria 4/16/2024 10:58 AM   Dictation workstation:   [id number]      CT thoracic spine wo IV contrast   Final Result   CHEST:        Overall limited motion compromised exam. Bandlike possible infiltrate   or atelectasis near the left main fissure. No sizable pleural   effusion or pneumothorax.        ABDOMEN AND PELVIS:        No definite evidence of solid organ injury or free fluid within   limits of the exam.        Heterogenous 8 cm left pelvic/adnexal mass without separate   visualization of the uterus. Further evaluation with ultrasound or   MRI recommended.        Small hypodense lesion in small/atrophic left kidney, possible cyst   although not fully characterized on single phase exam.        Other findings as described above.        CT THORACIC AND LUMBAR SPINE:        No thoracic or lumbar vertebral compression deformity or acute   displaced fracture.        Extensive multilevel degenerative changes with scoliosis and mild   multilevel spondylolisthesis, as detailed above.        MACRO:   None.        Signed by: Maren Victoria 4/16/2024 11:22 AM   Dictation workstation:   [id number]      CT lumbar spine wo IV contrast   Final Result   CHEST:        Overall limited motion compromised exam. Bandlike possible infiltrate   or atelectasis near the left main fissure. No sizable pleural   effusion or pneumothorax.        ABDOMEN AND PELVIS:        No definite evidence  of solid organ injury or free fluid within   limits of the exam.        Heterogenous 8 cm left pelvic/adnexal mass without separate   visualization of the uterus. Further evaluation with ultrasound or   MRI recommended.        Small hypodense lesion in small/atrophic left kidney, possible cyst   although not fully characterized on single phase exam.        Other findings as described above.        CT THORACIC AND LUMBAR SPINE:        No thoracic or lumbar vertebral compression deformity or acute   displaced fracture.        Extensive multilevel degenerative changes with scoliosis and mild   multilevel spondylolisthesis, as detailed above.        MACRO:   None.        Signed by: Maren Victoria 4/16/2024 11:22 AM   Dictation workstation:   [id number]      CT hip right wo IV contrast   Final Result   Status post right DAISY with newly visualized presumably acute   nondisplaced periprosthetic fracture involving the base of the   proximal femoral greater trochanter. Additional nondisplaced   ill-defined presumably subacute and/or healed proximal femoral   fractures otherwise similar to 03/08/2022.        Heterogenous left adnexal mass as noted on CT pelvis reported   separately.        MACRO:   None        Signed by: Maren Victoria 4/16/2024 11:46 AM   Dictation workstation:   [id number]      XR pelvis 1-2 views   Final Result   No definite acute displaced osseous pelvic fracture. Status post   right DAISY with intact hardware. Subtle lucency of the proximal   femoral cortex similar to 03/08/2022 likely sequela of remote trauma   however acute fracture not entirely excluded. Correlation with   overlying tenderness recommended, further evaluation with CT   suggested if there is concern for acute fracture in this region.        MACRO:   None.        Signed by: Maren Victoria 4/16/2024 8:37 AM   Dictation workstation:   [id number]      XR femur right 2+ views   Final Result   No definite acute displaced osseous pelvic fracture.  Status post   right DAISY with intact hardware. Subtle lucency of the proximal   femoral cortex similar to 03/08/2022 likely sequela of remote trauma   however acute fracture not entirely excluded. Correlation with   overlying tenderness recommended, further evaluation with CT   suggested if there is concern for acute fracture in this region.        MACRO:   None.        Signed by: Maren Victoria 4/16/2024 8:37 AM   Dictation workstation:   [id number]            Medical Decision Making  89-year-old female presenting from home after fall.  No LOC, no blood thinners.  Awake and alert, GCS 15.  Patient is hemodynamically stable.  Lungs are clear and equal.  Equal pulses.  CT head obtained to rule out intracranial hemorrhage.  CT of the C/T/L-spine obtained to rule out spinal fractures.  Imaging of the chest/abdomen/pelvis obtained to rule out acute traumatic injuries.  Plain films obtained of right hip and femur to rule out acute fracture or dislocation.    Patient's workup reveals acute right periprosthetic hip fracture.  Patient's pain controlled with acetaminophen.  Discussed case with orthopedic surgery on-call, Dr. Meza, will be on consult.  Patient admitted to medicine.     Amount and/or Complexity of Data Reviewed  Labs: ordered. Decision-making details documented in ED Course.     Details: Labs reviewed.  CBC with baseline hemoglobin, no leukocytosis, metabolic panel with normal renal function, no significant electrolyte derangements.  Normal troponin.  Normal coags.  Radiology: ordered. Decision-making details documented in ED Course.     Details: CT of the head negative for intracranial hemorrhage.  No acute traumatic findings of the C, T or L-spine.  No acute traumatic findings of the chest abdomen or pelvis.  Right hip x-ray negative for obvious fracture.  CT right hip without contrast obtained given high suspicion of periprosthetic fracture and demonstrates findings consistent with acute periprosthetic  fracture.  ECG/medicine tests: ordered and independent interpretation performed. Decision-making details documented in ED Course.     Details: Twelve-lead ECG obtained at 0728 by my interpretation demonstrates normal sinus rhythm with a rate of 70, no acute ST elevation or depression, no other acute ischemic changes.        Procedure  Procedures     Manish Campbell MD  04/16/24 1217

## 2024-04-16 NOTE — H&P
History Of Present Illness  Gracie Carlson is a 89 y.o. female presenting with right hip pain status fall.  Patient states she got up to go to the bathroom this morning and tripped causing her to fall.  Patient did hit her head on the dresser denies LOC or headache.  Patient is not on a blood thinner.  Patient complained of right thigh and hip pain called her son who called EMS.  Patient had a fall 2 years ago with the same hip which happens to be a prosthetic, no surgical intervention went to rehab and recovered.  Ortho saw patient in the ED and is recommending PT to see her in the a.m. and evaluate.  Patient lives at home alone with her dog.  Patient denies SOB or chest pain.  Patient denies abdominal pain, nausea or vomiting.  Denies fever or chills.  Patient did receive Tylenol in the ER and which did help with her pain.  CBC and BMP unremarkable.  X-ray of the femur shows s/p right DAISY with intact hardware.  Subtle lucency of the proximal femoral cortex similar to 3/8/2022 likely sequela of remote trauma.  Patient resting comfortably and cooperative and will be admitted for further evaluation.     Past Medical History  She has a past medical history of Hypothyroidism, unspecified (01/15/2016), Personal history of diseases of the blood and blood-forming organs and certain disorders involving the immune mechanism (11/13/2015), Personal history of other diseases of the circulatory system (01/15/2016), Personal history of other diseases of the circulatory system (01/15/2016), Personal history of other diseases of the musculoskeletal system and connective tissue (11/13/2015), and Pulmonary hypertension, unspecified (Multi) (01/15/2016).    Surgical History  She has a past surgical history that includes Coronary angioplasty (01/15/2016); Hysterectomy (01/15/2016); Aortic valve replacement (01/15/2016); Esophagogastroduodenoscopy (01/15/2016); Knee surgery (01/15/2016); Other surgical history (05/19/2022); Other  surgical history (05/19/2022); Other surgical history (05/19/2022); Other surgical history (05/19/2022); Other surgical history (05/19/2022); CT angio abdomen pelvis w and or wo IV IV contrast (11/6/2015); MR angio head wo IV contrast (1/22/2023); and MR angio neck wo IV contrast (1/22/2023).     Social History  She reports that she has quit smoking. Her smoking use included cigarettes. She has never used smokeless tobacco. No history on file for alcohol use and drug use.    Family History  Family History   Problem Relation Name Age of Onset    Hypertension Mother      Heart disease Father          RHEUMATIC    Hypertension Father      Coronary artery disease Father      Other (ENDOCARDITIS) Father          Allergies  Oxycodone and Sulfa (sulfonamide antibiotics)    Review of Systems   Review of systems: 10 system were reviewed and were negative except what was mentioned in history of present illness    Physical Exam  Constitutional:       Comments: Frail-appearing   HENT:      Head: Normocephalic.      Mouth/Throat:      Mouth: Mucous membranes are moist.   Eyes:      Pupils: Pupils are equal, round, and reactive to light.   Cardiovascular:      Rate and Rhythm: Normal rate and regular rhythm.      Heart sounds: Normal heart sounds, S1 normal and S2 normal.   Pulmonary:      Effort: Pulmonary effort is normal.      Breath sounds: Normal breath sounds.   Abdominal:      General: Bowel sounds are normal.      Palpations: Abdomen is soft.   Musculoskeletal:         General: Normal range of motion.      Cervical back: Neck supple.      Comments: RLE slightly externally rotated   Skin:     General: Skin is warm.   Neurological:      Mental Status: She is alert and oriented to person, place, and time.      Motor: Weakness present.   Psychiatric:         Mood and Affect: Mood normal.         Behavior: Behavior normal.          Last Recorded Vitals  /73   Pulse 72   Temp 36.9 °C (98.4 °F) (Temporal)   Resp 16    Wt 63 kg (139 lb)   SpO2 98%     Relevant Results  CBC:   Results from last 7 days   Lab Units 04/16/24  0807   WBC AUTO x10*3/uL 9.4   RBC AUTO x10*6/uL 3.65*   HEMOGLOBIN g/dL 10.0*   HEMATOCRIT % 31.2*   MCV fL 86   MCH pg 27.4   MCHC g/dL 32.1   RDW % 13.7   PLATELETS AUTO x10*3/uL 176     CMP:    Results from last 7 days   Lab Units 04/16/24  0807   SODIUM mmol/L 139   POTASSIUM mmol/L 4.1   CHLORIDE mmol/L 106   CO2 mmol/L 27   BUN mg/dL 28*   CREATININE mg/dL 0.82   GLUCOSE mg/dL 112*   PROTEIN TOTAL g/dL 6.1*   CALCIUM mg/dL 8.9   BILIRUBIN TOTAL mg/dL 0.6   ALK PHOS U/L 52   AST U/L 18   ALT U/L 14     BMP:    Results from last 7 days   Lab Units 04/16/24  0807   SODIUM mmol/L 139   POTASSIUM mmol/L 4.1   CHLORIDE mmol/L 106   CO2 mmol/L 27   BUN mg/dL 28*   CREATININE mg/dL 0.82   CALCIUM mg/dL 8.9   GLUCOSE mg/dL 112*     Magnesium:    Troponin:    Results from last 7 days   Lab Units 04/16/24  0807   TROPHS ng/L 8     BNP:     Lipid Panel:    Imagining  ECG 12 lead  Normal sinus rhythm  Voltage criteria for left ventricular hypertrophy  Abnormal ECG  When compared with ECG of 21-JAN-2023 14:11,  Previous ECG has undetermined rhythm, needs review  T wave inversion now evident in Inferior leads  See ED provider note for full interpretation and clinical correlation  Confirmed by Jahaira Hylton ([id number]) on 4/16/2024 12:25:13 PM  CT hip right wo IV contrast  Narrative: Interpreted By:  Maren Victoria,   STUDY:  CT HIP RIGHT WO IV CONTRAST; ;  4/16/2024 10:15 am      INDICATION:  Signs/Symptoms:prior right total hip, fall, pain in rt upper anterior  thigh.      COMPARISON:  03/08/2022      ACCESSION NUMBER(S):  [id number]      ORDERING CLINICIAN:  AGUILAR PELAEZ      TECHNIQUE:  Axial CT images were obtained through the right hemipelvis and hip.  Artifact suppression images were generated. Sagittal and coronal  reconstructions were generated. No intravenous contrast given for the  exam.       FINDINGS:  Right hip arthroplasty again noted with 2 screws fixing the  acetabular component and long-stemmed proximal femoral prosthesis.  Prosthetic components in anatomic alignment. Ill-defined minimally  distracted nonacute fractures of the lateral and posterior proximal  femoral cortex at prosthetic stem which were seen on the previous  exam. There is healed/bridging callus at minimally distracted  fracture of the posterior tip of the greater trochanter seen on the  previous exam. A nondisplaced fracture at the medial base of the  proximal femoral greater trochanter is new from the prior exam  however for example coronal reconstruction image 57/102.      There is mildly heterogenous partially dense soft tissue thickening  in the visualized proximal thigh musculature. Bandlike soft tissue  thickening in the medial gluteal crease similar to the previous exam.      No acute displaced osseous pelvic fracture. Distended urinary  bladder. Heterogenous left adnexal mass as noted on CT pelvis  reported separately.      Impression: Status post right DAISY with newly visualized presumably acute  nondisplaced periprosthetic fracture involving the base of the  proximal femoral greater trochanter. Additional nondisplaced  ill-defined presumably subacute and/or healed proximal femoral  fractures otherwise similar to 03/08/2022.      Heterogenous left adnexal mass as noted on CT pelvis reported  separately.      MACRO:  None      Signed by: Maren Victoria 4/16/2024 11:46 AM  Dictation workstation:   [id number]  CT thoracic spine wo IV contrast, CT lumbar spine wo IV contrast, CT chest abdomen pelvis w IV contrast  Narrative: Interpreted By:  Maren Victoria,   STUDY:  CT CHEST ABDOMEN PELVIS W IV CONTRAST; CT LUMBAR SPINE WO IV  CONTRAST; CT THORACIC SPINE WO IV CONTRAST;  4/16/2024 10:16 am;  4/16/2024 10:15 am      INDICATION:  Signs/Symptoms:fall.      COMPARISON:  None.      ACCESSION NUMBER(S):  [id number]; [id number];  [id number]      ORDERING CLINICIAN:  AGUILAR PELAEZ      TECHNIQUE:  CT of the chest, abdomen, and pelvis was performed. Sagittal and  coronal reconstructions were generated. Arterial phase images only  were obtained. Axial, sagittal and coronal images of the thoracic and  lumbar spine were generated.  75 cc Omnipaque 350 intravenous  contrast given for the examination.      FINDINGS:  Artifact related to motion and overlying upper extremities limits  evaluation.          CHEST:              CHEST WALL AND LOWER NECK: No significant axillary lymphadenopathy.      MEDIASTINUM AND RAYSHAWN:  Mild gaseous distention of the proximal  esophagus. No significant mediastinal or hilar adenopathy.      HEART AND VESSELS:  The heart is prominent. No significant  pericardial effusion. Metallic stent in the region of the aortic  valve/root. Multifocal atherosclerotic calcifications including the  coronary arteries and mitral valve/annulus region. No thoracic aortic  aneurysm.      LUNGS, PLEURA, LARGE AIRWAYS:  Respiratory motion artifact limits  parenchymal evaluation. Elongated probable debris strand in the  proximal trachea. Slight pulmonary heterogeneity. No confluent  airspace opacity. Ill-defined thickening in the posterior left upper  lobe along the main fissure. Mild probable dependent atelectasis in  both lung bases. No sizable pleural effusion or pneumothorax.      BONES:  No definite acute displaced rib fracture.          ABDOMEN/PELVIS:              ABDOMINAL ORGANS:      LIVER: Subtle nodular hypodense presumed focal fatty infiltrate  anteriorly in the left lobe of the liver near the falciform ligament.      GALL BLADDER AND BILIARY TREE: No sizable calcified gallstone or  biliary dilatation within limits of the exam.      SPLEEN: No definite focal lesion within limits of the exam.  Subcentimeter isodense probable splenule adjacent to the inferior  pole.      PANCREAS: Tail is somewhat truncated. No definite focal  lesion within  limits of the exam.      ADRENALS: No adrenal mass      KIDNEYS AND URETERS: Mild left renal atrophy. Approximately 1 cm  rounded hypodensity posteriorly in the left kidney. No hydronephrosis  within limits of nephrogram phase images and motion.      BOWEL: No gross dilated large or small bowel loops. Gaseous  distention of the rectum.      PERITONEUM, RETROPERITONEUM, NODES: Cul-de-sac is partially obscured.  No significant free fluid as visualized. No gross free  intraperitoneal air. No significant retroperitoneal adenopathy. 10 mm  calcific ring shadow in the anterior left lower quadrant, possible  calcified lymph node or fat necrosis.      VESSELS:  Heavy aortic atherosclerotic calcifications. No abdominal  aortic aneurysm.      PELVIS: Artifact from right hip prosthesis limits evaluation. Urinary  bladder is moderately distended and grossly normal in contour as  visualized. Heterogenous 8.0 x 4.8 cm left adnexal mass with nodular  solid/enhancing and low-density cystic components, long axis oriented  AP. The uterus is not separately visualized.      ABDOMINAL WALL: No sizable abdominal wall hernia. Small rounded  presumed dystrophic calcification in the right gluteal subcutaneous  fat.      BONES: Right hip arthroplasty. Diffuse osteopenia. Multifocal  degenerative changes.          THORACIC SPINE:      ALIGNMENT: Mild anterolisthesis at C7-T1 and T1-2. Mild S shaped  scoliosis convex to the left proximally and to the right distally.      VERTEBRAE AND DISC SPACES: Diffuse osteopenia. Diffuse intervertebral  disc space narrowing and endplate spurring. Multilevel smooth  endplate depression/Schmorl's nodes with small vacuum phenomena  distally. No gross central canal narrowing.      LUMBAR SPINE:      ALIGNMENT: Moderate levocurvature centered at L2-3. Mild  retrolisthesis at L1-2 and L2-3.      VERTEBRAE AND DISC SPACES: Diffuse osteopenia. Diffuse intervertebral  disc space narrowing relatively  sparing L3-4 with multilevel vacuum  discs, endplate sclerosis and spurring. Extensive bilateral facet  joint narrowing and spurring bilaterally. 2 subcentimeter sclerotic  foci in L3      Impression: CHEST:      Overall limited motion compromised exam. Bandlike possible infiltrate  or atelectasis near the left main fissure. No sizable pleural  effusion or pneumothorax.      ABDOMEN AND PELVIS:      No definite evidence of solid organ injury or free fluid within  limits of the exam.      Heterogenous 8 cm left pelvic/adnexal mass without separate  visualization of the uterus. Further evaluation with ultrasound or  MRI recommended.      Small hypodense lesion in small/atrophic left kidney, possible cyst  although not fully characterized on single phase exam.      Other findings as described above.      CT THORACIC AND LUMBAR SPINE:      No thoracic or lumbar vertebral compression deformity or acute  displaced fracture.      Extensive multilevel degenerative changes with scoliosis and mild  multilevel spondylolisthesis, as detailed above.      MACRO:  None.      Signed by: Maren Victoria 4/16/2024 11:22 AM  Dictation workstation:   [id number]  CT cervical spine wo IV contrast  Narrative: Interpreted By:  Maren Victoria,   STUDY:  CT CERVICAL SPINE WO IV CONTRAST;  4/16/2024 10:15 am      INDICATION:  Signs/Symptoms:fall.      COMPARISON:  03/08/2022      ACCESSION NUMBER(S):  [id number]      ORDERING CLINICIAN:  AGUILAR PELAEZ      TECHNIQUE:  CT images were obtained through the cervical spine. Sagittal and  coronal reconstructions were generated.      FINDINGS:          ALIGNMENT:  Midcervical lordosis is straightened. Anterolisthesis of C7 with 4 mm  of anterolisthesis at C7-T1 and mild anterolisthesis at T1-2. These  findings are similar to the prior exam.      VERTEBRAE/DISC SPACES:  No compression deformity or acute displaced fracture.  Diffuse  osteopenia and multilevel productive/degenerative changes  including  atlantoaxial joint space narrowing with spurring and adjacent  calcifications/ossifications, multilevel intervertebral disc space  narrowing and endplate spurring most prominent at C5-6 and C6-7 and  multilevel bilateral facet joint narrowing and spurring similar to  the previous exam. Small calcifications in the peripheral spinal  canal also similar to the prior study      ADDITIONAL FINDINGS:  No abnormal thickening of the prevertebral soft tissues. Nodular  presumed cerumen in the left external auditory canal. Minimal patchy  presumed scarring in both lung apices. Faint mucous strand in the mid  trachea. Bilateral carotid bulb atherosclerotic calcifications.      Impression: No acute cervical vertebral displacement or acute displaced fracture.  Alignment abnormalities and multilevel degenerative changes similar  to 03/08/2022.      Paraspinal/soft tissue findings as above.      MACRO:  None.      Signed by: Maren Victoria 4/16/2024 10:58 AM  Dictation workstation:   [id number]  CT head wo IV contrast  Narrative: Interpreted By:  Maren Victoria,   STUDY:  CT HEAD WO IV CONTRAST;  4/16/2024 10:15 am      INDICATION:  Signs/Symptoms:fall, head trauma.      COMPARISON:  01/21/2023      ACCESSION NUMBER(S):  [id number]      ORDERING CLINICIAN:  AGUILAR PELAEZ      TECHNIQUE:  Unenhanced CT images of the head were obtained.      FINDINGS:  The ventricles, cisterns and sulci are enlarged, consistent with  diffuse volume loss. There are areas of nonspecific white matter  hypodensity, which are probably age related or microvascular in  nature. These findings are similar to the exam. There is no acute  intracranial hemorrhage, mass effect or midline shift. No extraaxial  fluid collection.      No acute displaced calvarial fracture.      Visualized paranasal sinuses are clear.      Impression: No acute intracranial hemorrhage or mass-effect.      MACRO:  None.      Signed by: Maren Victoria 4/16/2024 10:53  AM  Dictation workstation:   [id number]  XR pelvis 1-2 views, XR femur right 2+ views  Narrative: Interpreted By:  Maren Victoria,   STUDY:  XR PELVIS 1-2 VIEWS; XR FEMUR RIGHT 2+ VIEWS;  4/16/2024 7:51 am      INDICATION:  Signs/Symptoms:fall, leg pain; Signs/Symptoms:fall, rt upper leg pain.      COMPARISON:  05/04/2022, 03/08/2022      ACCESSION NUMBER(S):  [id number]; [id number]      ORDERING CLINICIAN:  AGUILAR PELAEZ      FINDINGS:  Frontal view of the pelvis and four views of the right femur obtained.      Osteopenia and overlying bowel content limit evaluation. No definite  focal disruption of the pelvic osseous contours to suggest acute  displaced fracture. Degenerative changes of right-greater-than-left  SI joints, visualized lower lumbar spine and pubic symphysis. Right  total hip arthroplasty with at least 2 screws fixing the acetabular  component and long-stemmed proximal femoral prosthesis in grossly  stable anatomic alignment. Ill-defined linear lucency of the  overlying anterolateral proximal femoral cortex similar to the  previous exam. Otherwise no abnormal lucency or acute fracture around  the hardware. Stable smooth density superolateral to the femoral  greater trochanter. Grossly intact alignment at the knee joint.  Scattered soft tissue presumed vascular calcifications.      Impression: No definite acute displaced osseous pelvic fracture. Status post  right DAISY with intact hardware. Subtle lucency of the proximal  femoral cortex similar to 03/08/2022 likely sequela of remote trauma  however acute fracture not entirely excluded. Correlation with  overlying tenderness recommended, further evaluation with CT  suggested if there is concern for acute fracture in this region.      MACRO:  None.      Signed by: Maren Victoria 4/16/2024 8:37 AM  Dictation workstation:   [id number]       Assessment/Plan    Mechanical fall right periprosthetic hip  fracture  Anemia  CAD  Hypothyroidism  HTN/HLD  Mitral valve prolapse  COPD    -Orthopedics consulted  -CBC BMP daily  -Monitor electrolytes and replace per protocol  -Fall precautions  -Resume home meds when reconciled  -PT/OT evaluation  -TCC for discharge planning    DVTp: Lovenox    PLAN: SNF for rehab    SARIKA Quiles-CNP    Plan of care was discussed extensively with patient.  Patient verbalized understanding through teach back method.  All question and concerns addressed upon examination.    Of note, this documentation is completed using the Dragon Dictation system (voice recognition software). There may be spelling and/or grammatical errors that were not corrected prior to final submission.

## 2024-04-16 NOTE — CONSULTS
Reason For Consult  Right periprosthetic greater trochanteric nondisplaced fracture around a total hip replacement that appears to be stable and well-positioned.    History Of Present Illness  Gracie Carlson is a 89 y.o. female presenting with periprosthetic greater trochanteric essentially nondisplaced fracture.    Patient had a total hip replacement performed by Chente Nathan M.D. approximately 20 years ago    Patient also had sustained a smaller avulsion fracture of the greater troches approximately 2 to 3 years ago which was treated nonsurgically and went on to heal she required some medical management and rehabilitation but did return to living independently at home this    This morning she twisted fell was on the ground was able to crawl and get up but had pain and obviously brought to the ER for evaluation and assessment with x-rays and CAT scan.     Past Medical History  She has a past medical history of Hypothyroidism, unspecified (01/15/2016), Personal history of diseases of the blood and blood-forming organs and certain disorders involving the immune mechanism (11/13/2015), Personal history of other diseases of the circulatory system (01/15/2016), Personal history of other diseases of the circulatory system (01/15/2016), Personal history of other diseases of the musculoskeletal system and connective tissue (11/13/2015), and Pulmonary hypertension, unspecified (Multi) (01/15/2016).    Surgical History  She has a past surgical history that includes Coronary angioplasty (01/15/2016); Hysterectomy (01/15/2016); Aortic valve replacement (01/15/2016); Esophagogastroduodenoscopy (01/15/2016); Knee surgery (01/15/2016); Other surgical history (05/19/2022); Other surgical history (05/19/2022); Other surgical history (05/19/2022); Other surgical history (05/19/2022); Other surgical history (05/19/2022); CT angio abdomen pelvis w and or wo IV IV contrast (11/6/2015); MR angio head wo IV contrast (1/22/2023);  and MR angio neck wo IV contrast (1/22/2023).     Social History  She reports that she has quit smoking. Her smoking use included cigarettes. She has never used smokeless tobacco. No history on file for alcohol use and drug use.    Family History  Family History   Problem Relation Name Age of Onset    Hypertension Mother      Heart disease Father          RHEUMATIC    Hypertension Father      Coronary artery disease Father      Other (ENDOCARDITIS) Father          Allergies  Oxycodone and Sulfa (sulfonamide antibiotics)    Review of Systems  History of right total hip     Physical Exam  Head normocephalic atraumatic  Lungs clear to station percussion  Abdominal exam nontender nondistended right leg is out to length    Incision clean and dry    She can move toes foot and ankle    She does not have a lot of pain with gentle logrolling or gentle assisted flexion she has pain with straight leg raise the extensor mechanism is intact    She can plantarflex dorsiflex toes foot and ankle    There is no obviously discoloration no shortening normal rotation no evidence of dislocation or instability certainly pain with attempted weightbearing       Last Recorded Vitals  Blood pressure 141/73, pulse 72, temperature 36.9 °C (98.4 °F), temperature source Temporal, resp. rate 16, height 1.524 m (5'), weight 63 kg (139 lb), SpO2 98%.    Relevant Results      Scheduled medications    Continuous medications    PRN medications    Results for orders placed or performed during the hospital encounter of 04/16/24 (from the past 24 hour(s))   ECG 12 lead   Result Value Ref Range    Ventricular Rate 70 BPM    Atrial Rate 70 BPM    UT Interval 130 ms    QRS Duration 76 ms    QT Interval 378 ms    QTC Calculation(Bazett) 408 ms    R Axis -23 degrees    T Axis 8 degrees    QRS Count 11 beats    Q Onset 225 ms    P Onset 160 ms    P Offset 209 ms    T Offset 414 ms    QTC Fredericia 398 ms   CBC and Auto Differential   Result Value Ref Range     WBC 9.4 4.4 - 11.3 x10*3/uL    nRBC 0.0 0.0 - 0.0 /100 WBCs    RBC 3.65 (L) 4.00 - 5.20 x10*6/uL    Hemoglobin 10.0 (L) 12.0 - 16.0 g/dL    Hematocrit 31.2 (L) 36.0 - 46.0 %    MCV 86 80 - 100 fL    MCH 27.4 26.0 - 34.0 pg    MCHC 32.1 32.0 - 36.0 g/dL    RDW 13.7 11.5 - 14.5 %    Platelets 176 150 - 450 x10*3/uL    Neutrophils % 84.1 40.0 - 80.0 %    Immature Granulocytes %, Automated 0.5 0.0 - 0.9 %    Lymphocytes % 7.8 13.0 - 44.0 %    Monocytes % 5.9 2.0 - 10.0 %    Eosinophils % 1.4 0.0 - 6.0 %    Basophils % 0.3 0.0 - 2.0 %    Neutrophils Absolute 7.88 (H) 1.60 - 5.50 x10*3/uL    Immature Granulocytes Absolute, Automated 0.05 0.00 - 0.50 x10*3/uL    Lymphocytes Absolute 0.73 (L) 0.80 - 3.00 x10*3/uL    Monocytes Absolute 0.55 0.05 - 0.80 x10*3/uL    Eosinophils Absolute 0.13 0.00 - 0.40 x10*3/uL    Basophils Absolute 0.03 0.00 - 0.10 x10*3/uL   Comprehensive metabolic panel   Result Value Ref Range    Glucose 112 (H) 74 - 99 mg/dL    Sodium 139 136 - 145 mmol/L    Potassium 4.1 3.5 - 5.3 mmol/L    Chloride 106 98 - 107 mmol/L    Bicarbonate 27 21 - 32 mmol/L    Anion Gap 10 10 - 20 mmol/L    Urea Nitrogen 28 (H) 6 - 23 mg/dL    Creatinine 0.82 0.50 - 1.05 mg/dL    eGFR 66 >60 mL/min/1.73m*2    Calcium 8.9 8.6 - 10.3 mg/dL    Albumin 3.7 3.4 - 5.0 g/dL    Alkaline Phosphatase 52 33 - 136 U/L    Total Protein 6.1 (L) 6.4 - 8.2 g/dL    AST 18 9 - 39 U/L    Bilirubin, Total 0.6 0.0 - 1.2 mg/dL    ALT 14 7 - 45 U/L   Protime-INR   Result Value Ref Range    Protime 11.4 9.8 - 12.8 seconds    INR 1.0 0.9 - 1.1   aPTT   Result Value Ref Range    aPTT 32 27 - 38 seconds   Troponin I, High Sensitivity   Result Value Ref Range    Troponin I, High Sensitivity 8 0 - 13 ng/L        Assessment/Plan     Periprosthetic nondisplaced greater trochanteric fracture around the right total hip    Nonsurgical treatment    Patient can be 50% weight-bear on the affected right lower extremity with a walker assist at all  times    Obviously fall prevention is mandatory    No abduction exercises due to the location of the trochanteric fracture just below the abductor insertion    Routine DVT prophylaxis would be recommended for 28 days with Xarelto 10 mg p.o. daily or injectable Lovenox based on weight and creatinine levels 30 mg once a day or possibly 40 mg once a day    Follow-up with Chente Nathan M.D. in 3 weeks for x-rays as an outpatient.    Patient will be assessed for stability independence and possible need for rehabilitation    Chente Nathan MD

## 2024-04-16 NOTE — CONSULTS
Consults      Consult Note  Patient: Gracie Carlson  Unit/Bed: AC10/AC10  YOB: 1928  MRN: [id number]  Acct: [id number]   Admitting Diagnosis: No admission diagnoses are documented for this encounter.  Date:  4/16/2024  Hospital Day: 0    Requesting physician: Manish Campbell MD    Complaint:  Right hip pain    History of Present Illness:  Gracie Carlson is a 89 year old female with a history of hypothyroidism, anemia, HTN, CAD, and RA who presented to the emergency room after mechanical fall onto right hip. Patient reports she was going into the bathroom and she slipped and fell. Images performed in the emergency room confirmed a right periprosthetic hip fracture in which orthopedics was consulted for evaluation and treatment recommendations.     PMHx:  Past Medical History:   Diagnosis Date    Hypothyroidism, unspecified 01/15/2016    Hypothyroidism    Personal history of diseases of the blood and blood-forming organs and certain disorders involving the immune mechanism 11/13/2015    History of anemia    Personal history of other diseases of the circulatory system 01/15/2016    History of coronary atherosclerosis    Personal history of other diseases of the circulatory system 01/15/2016    History of hypertension    Personal history of other diseases of the musculoskeletal system and connective tissue 11/13/2015    History of rheumatoid arthritis    Pulmonary hypertension, unspecified (Multi) 01/15/2016    Pulmonary hypertension       PSHx:  Past Surgical History:   Procedure Laterality Date    AORTIC VALVE REPLACEMENT  01/15/2016    Aortic Valve Replacement    CORONARY ANGIOPLASTY  01/15/2016    PTCA    CT ABDOMEN PELVIS ANGIOGRAM W AND/OR WO IV CONTRAST  11/6/2015    CT ABDOMEN PELVIS ANGIOGRAM W AND/OR WO IV CONTRAST 11/6/2015 [facility]    ESOPHAGOGASTRODUODENOSCOPY  01/15/2016    Diagnostic Esophagogastroduodenoscopy    HYSTERECTOMY  01/15/2016    Hysterectomy    KNEE SURGERY   01/15/2016    Knee Surgery Left    MR HEAD ANGIO WO IV CONTRAST  1/22/2023    MR HEAD ANGIO WO IV CONTRAST 1/22/2023 [facility]    MR NECK ANGIO WO IV CONTRAST  1/22/2023    MR NECK ANGIO WO IV CONTRAST 1/22/2023 [facility]    OTHER SURGICAL HISTORY  05/19/2022    Appendectomy    OTHER SURGICAL HISTORY  05/19/2022    Cataract surgery    OTHER SURGICAL HISTORY  05/19/2022    Colonoscopy    OTHER SURGICAL HISTORY  05/19/2022    Hip replacement    OTHER SURGICAL HISTORY  05/19/2022    Tonsillectomy       Social Hx:  Social History     Socioeconomic History    Marital status:      Spouse name: Not on file    Number of children: Not on file    Years of education: Not on file    Highest education level: Not on file   Occupational History    Not on file   Tobacco Use    Smoking status: Former     Types: Cigarettes    Smokeless tobacco: Never   Substance and Sexual Activity    Alcohol use: Not on file    Drug use: Not on file    Sexual activity: Not on file   Other Topics Concern    Not on file   Social History Narrative    Not on file     Social Determinants of Health     Financial Resource Strain: Not on file   Food Insecurity: Not on file   Transportation Needs: Not on file   Physical Activity: Not on file   Stress: Not on file   Social Connections: Not on file   Intimate Partner Violence: Not on file   Housing Stability: Not on file       Family Hx:  Family History   Problem Relation Name Age of Onset    Hypertension Mother      Heart disease Father          RHEUMATIC    Hypertension Father      Coronary artery disease Father      Other (ENDOCARDITIS) Father         Review of Systems:   Review of Systems   Constitutional:  Negative for chills and fever.   HENT:  Negative for congestion and sore throat.    Respiratory:  Negative for cough, chest tightness and shortness of breath.    Cardiovascular:  Negative for chest pain and leg swelling.   Gastrointestinal:  Negative for abdominal pain,  diarrhea, nausea and vomiting.   Genitourinary:  Negative for dysuria and hematuria.   Musculoskeletal:  Positive for arthralgias and gait problem. Negative for back pain and neck pain.   Skin:  Negative for color change.   Neurological:  Negative for dizziness, weakness and numbness.         Physical Examination:    Visit Vitals  /73   Pulse 72   Temp 36.9 °C (98.4 °F) (Temporal)   Resp 16      Physical Exam  Constitutional:       Appearance: Normal appearance. She is not ill-appearing.   Pulmonary:      Effort: Pulmonary effort is normal.   Abdominal:      General: Abdomen is flat. Bowel sounds are normal.   Musculoskeletal:         General: Swelling, tenderness and signs of injury present.      Cervical back: Normal range of motion and neck supple.   Skin:     General: Skin is warm and dry.   Neurological:      Mental Status: She is alert and oriented to person, place, and time.   Psychiatric:         Mood and Affect: Mood normal.         Behavior: Behavior normal.         LABS:  CBC:   Lab Results   Component Value Date    WBC 9.4 04/16/2024    RBC 3.65 (L) 04/16/2024    HGB 10.0 (L) 04/16/2024    HCT 31.2 (L) 04/16/2024    MCV 86 04/16/2024    MCH 27.4 04/16/2024    MCHC 32.1 04/16/2024    RDW 13.7 04/16/2024     04/16/2024     CBC with Differential:    Lab Results   Component Value Date    WBC 9.4 04/16/2024    RBC 3.65 (L) 04/16/2024    HGB 10.0 (L) 04/16/2024    HCT 31.2 (L) 04/16/2024     04/16/2024    MCV 86 04/16/2024    MCH 27.4 04/16/2024    MCHC 32.1 04/16/2024    RDW 13.7 04/16/2024    NRBC 0.0 04/16/2024    LYMPHOPCT 7.8 04/16/2024    MONOPCT 5.9 04/16/2024    EOSPCT 1.4 04/16/2024    BASOPCT 0.3 04/16/2024    MONOSABS 0.55 04/16/2024    LYMPHSABS 0.73 (L) 04/16/2024    EOSABS 0.13 04/16/2024    BASOSABS 0.03 04/16/2024     CMP:    Lab Results   Component Value Date     04/16/2024    K 4.1 04/16/2024     04/16/2024    CO2 27 04/16/2024    BUN 28 (H) 04/16/2024     "CREATININE 0.82 04/16/2024    GLUCOSE 112 (H) 04/16/2024    PROT 6.1 (L) 04/16/2024    CALCIUM 8.9 04/16/2024    BILITOT 0.6 04/16/2024    ALKPHOS 52 04/16/2024    AST 18 04/16/2024    ALT 14 04/16/2024     BMP:    Lab Results   Component Value Date     04/16/2024    K 4.1 04/16/2024     04/16/2024    CO2 27 04/16/2024    BUN 28 (H) 04/16/2024    CREATININE 0.82 04/16/2024    CALCIUM 8.9 04/16/2024    GLUCOSE 112 (H) 04/16/2024     Magnesium:No results found for: \"MG\"  Troponin:  No results found for: \"TROPONINI\"    Imaging:  ECG 12 lead    Result Date: 4/16/2024  Normal sinus rhythm Voltage criteria for left ventricular hypertrophy Abnormal ECG When compared with ECG of 21-JAN-2023 14:11, Previous ECG has undetermined rhythm, needs review T wave inversion now evident in Inferior leads See ED provider note for full interpretation and clinical correlation Confirmed by Jahaira Hylton ([id number]) on 4/16/2024 12:25:13 PM    CT hip right wo IV contrast    Result Date: 4/16/2024  Interpreted By:  Maren Victoria, STUDY: CT HIP RIGHT WO IV CONTRAST; ;  4/16/2024 10:15 am   INDICATION: Signs/Symptoms:prior right total hip, fall, pain in rt upper anterior thigh.   COMPARISON: 03/08/2022   ACCESSION NUMBER(S): [id number]   ORDERING CLINICIAN: AGUILAR PELAEZ   TECHNIQUE: Axial CT images were obtained through the right hemipelvis and hip. Artifact suppression images were generated. Sagittal and coronal reconstructions were generated. No intravenous contrast given for the exam.   FINDINGS: Right hip arthroplasty again noted with 2 screws fixing the acetabular component and long-stemmed proximal femoral prosthesis. Prosthetic components in anatomic alignment. Ill-defined minimally distracted nonacute fractures of the lateral and posterior proximal femoral cortex at prosthetic stem which were seen on the previous exam. There is healed/bridging callus at minimally distracted fracture of the posterior tip of the " greater trochanter seen on the previous exam. A nondisplaced fracture at the medial base of the proximal femoral greater trochanter is new from the prior exam however for example coronal reconstruction image 57/102.   There is mildly heterogenous partially dense soft tissue thickening in the visualized proximal thigh musculature. Bandlike soft tissue thickening in the medial gluteal crease similar to the previous exam.   No acute displaced osseous pelvic fracture. Distended urinary bladder. Heterogenous left adnexal mass as noted on CT pelvis reported separately.       Status post right DAISY with newly visualized presumably acute nondisplaced periprosthetic fracture involving the base of the proximal femoral greater trochanter. Additional nondisplaced ill-defined presumably subacute and/or healed proximal femoral fractures otherwise similar to 03/08/2022.   Heterogenous left adnexal mass as noted on CT pelvis reported separately.   MACRO: None   Signed by: Maren Victoria 4/16/2024 11:46 AM Dictation workstation:   [id number]    CT thoracic spine wo IV contrast    Result Date: 4/16/2024  Interpreted By:  Maren Victoria, STUDY: CT CHEST ABDOMEN PELVIS W IV CONTRAST; CT LUMBAR SPINE WO IV CONTRAST; CT THORACIC SPINE WO IV CONTRAST;  4/16/2024 10:16 am; 4/16/2024 10:15 am   INDICATION: Signs/Symptoms:fall.   COMPARISON: None.   ACCESSION NUMBER(S): [id number]; [id number]; [id number]   ORDERING CLINICIAN: AGUILAR PELAEZ   TECHNIQUE: CT of the chest, abdomen, and pelvis was performed. Sagittal and coronal reconstructions were generated. Arterial phase images only were obtained. Axial, sagittal and coronal images of the thoracic and lumbar spine were generated.  75 cc Omnipaque 350 intravenous contrast given for the examination.   FINDINGS: Artifact related to motion and overlying upper extremities limits evaluation.     CHEST:       CHEST WALL AND LOWER NECK: No significant axillary lymphadenopathy.   MEDIASTINUM  AND RAYSHAWN:  Mild gaseous distention of the proximal esophagus. No significant mediastinal or hilar adenopathy.   HEART AND VESSELS:  The heart is prominent. No significant pericardial effusion. Metallic stent in the region of the aortic valve/root. Multifocal atherosclerotic calcifications including the coronary arteries and mitral valve/annulus region. No thoracic aortic aneurysm.   LUNGS, PLEURA, LARGE AIRWAYS:  Respiratory motion artifact limits parenchymal evaluation. Elongated probable debris strand in the proximal trachea. Slight pulmonary heterogeneity. No confluent airspace opacity. Ill-defined thickening in the posterior left upper lobe along the main fissure. Mild probable dependent atelectasis in both lung bases. No sizable pleural effusion or pneumothorax.   BONES:  No definite acute displaced rib fracture.     ABDOMEN/PELVIS:       ABDOMINAL ORGANS:   LIVER: Subtle nodular hypodense presumed focal fatty infiltrate anteriorly in the left lobe of the liver near the falciform ligament.   GALL BLADDER AND BILIARY TREE: No sizable calcified gallstone or biliary dilatation within limits of the exam.   SPLEEN: No definite focal lesion within limits of the exam. Subcentimeter isodense probable splenule adjacent to the inferior pole.   PANCREAS: Tail is somewhat truncated. No definite focal lesion within limits of the exam.   ADRENALS: No adrenal mass   KIDNEYS AND URETERS: Mild left renal atrophy. Approximately 1 cm rounded hypodensity posteriorly in the left kidney. No hydronephrosis within limits of nephrogram phase images and motion.   BOWEL: No gross dilated large or small bowel loops. Gaseous distention of the rectum.   PERITONEUM, RETROPERITONEUM, NODES: Cul-de-sac is partially obscured. No significant free fluid as visualized. No gross free intraperitoneal air. No significant retroperitoneal adenopathy. 10 mm calcific ring shadow in the anterior left lower quadrant, possible calcified lymph node or fat  necrosis.   VESSELS:  Heavy aortic atherosclerotic calcifications. No abdominal aortic aneurysm.   PELVIS: Artifact from right hip prosthesis limits evaluation. Urinary bladder is moderately distended and grossly normal in contour as visualized. Heterogenous 8.0 x 4.8 cm left adnexal mass with nodular solid/enhancing and low-density cystic components, long axis oriented AP. The uterus is not separately visualized.   ABDOMINAL WALL: No sizable abdominal wall hernia. Small rounded presumed dystrophic calcification in the right gluteal subcutaneous fat.   BONES: Right hip arthroplasty. Diffuse osteopenia. Multifocal degenerative changes.     THORACIC SPINE:   ALIGNMENT: Mild anterolisthesis at C7-T1 and T1-2. Mild S shaped scoliosis convex to the left proximally and to the right distally.   VERTEBRAE AND DISC SPACES: Diffuse osteopenia. Diffuse intervertebral disc space narrowing and endplate spurring. Multilevel smooth endplate depression/Schmorl's nodes with small vacuum phenomena distally. No gross central canal narrowing.   LUMBAR SPINE:   ALIGNMENT: Moderate levocurvature centered at L2-3. Mild retrolisthesis at L1-2 and L2-3.   VERTEBRAE AND DISC SPACES: Diffuse osteopenia. Diffuse intervertebral disc space narrowing relatively sparing L3-4 with multilevel vacuum discs, endplate sclerosis and spurring. Extensive bilateral facet joint narrowing and spurring bilaterally. 2 subcentimeter sclerotic foci in L3       CHEST:   Overall limited motion compromised exam. Bandlike possible infiltrate or atelectasis near the left main fissure. No sizable pleural effusion or pneumothorax.   ABDOMEN AND PELVIS:   No definite evidence of solid organ injury or free fluid within limits of the exam.   Heterogenous 8 cm left pelvic/adnexal mass without separate visualization of the uterus. Further evaluation with ultrasound or MRI recommended.   Small hypodense lesion in small/atrophic left kidney, possible cyst although not fully  characterized on single phase exam.   Other findings as described above.   CT THORACIC AND LUMBAR SPINE:   No thoracic or lumbar vertebral compression deformity or acute displaced fracture.   Extensive multilevel degenerative changes with scoliosis and mild multilevel spondylolisthesis, as detailed above.   MACRO: None.   Signed by: Maren Victoria 4/16/2024 11:22 AM Dictation workstation:   [id number]    CT lumbar spine wo IV contrast    Result Date: 4/16/2024  Interpreted By:  Maren Victoria, STUDY: CT CHEST ABDOMEN PELVIS W IV CONTRAST; CT LUMBAR SPINE WO IV CONTRAST; CT THORACIC SPINE WO IV CONTRAST;  4/16/2024 10:16 am; 4/16/2024 10:15 am   INDICATION: Signs/Symptoms:fall.   COMPARISON: None.   ACCESSION NUMBER(S): [id number]; [id number]; [id number]   ORDERING CLINICIAN: AGUILAR PELAEZ   TECHNIQUE: CT of the chest, abdomen, and pelvis was performed. Sagittal and coronal reconstructions were generated. Arterial phase images only were obtained. Axial, sagittal and coronal images of the thoracic and lumbar spine were generated.  75 cc Omnipaque 350 intravenous contrast given for the examination.   FINDINGS: Artifact related to motion and overlying upper extremities limits evaluation.     CHEST:       CHEST WALL AND LOWER NECK: No significant axillary lymphadenopathy.   MEDIASTINUM AND RAYSHAWN:  Mild gaseous distention of the proximal esophagus. No significant mediastinal or hilar adenopathy.   HEART AND VESSELS:  The heart is prominent. No significant pericardial effusion. Metallic stent in the region of the aortic valve/root. Multifocal atherosclerotic calcifications including the coronary arteries and mitral valve/annulus region. No thoracic aortic aneurysm.   LUNGS, PLEURA, LARGE AIRWAYS:  Respiratory motion artifact limits parenchymal evaluation. Elongated probable debris strand in the proximal trachea. Slight pulmonary heterogeneity. No confluent airspace opacity. Ill-defined thickening in the posterior  left upper lobe along the main fissure. Mild probable dependent atelectasis in both lung bases. No sizable pleural effusion or pneumothorax.   BONES:  No definite acute displaced rib fracture.     ABDOMEN/PELVIS:       ABDOMINAL ORGANS:   LIVER: Subtle nodular hypodense presumed focal fatty infiltrate anteriorly in the left lobe of the liver near the falciform ligament.   GALL BLADDER AND BILIARY TREE: No sizable calcified gallstone or biliary dilatation within limits of the exam.   SPLEEN: No definite focal lesion within limits of the exam. Subcentimeter isodense probable splenule adjacent to the inferior pole.   PANCREAS: Tail is somewhat truncated. No definite focal lesion within limits of the exam.   ADRENALS: No adrenal mass   KIDNEYS AND URETERS: Mild left renal atrophy. Approximately 1 cm rounded hypodensity posteriorly in the left kidney. No hydronephrosis within limits of nephrogram phase images and motion.   BOWEL: No gross dilated large or small bowel loops. Gaseous distention of the rectum.   PERITONEUM, RETROPERITONEUM, NODES: Cul-de-sac is partially obscured. No significant free fluid as visualized. No gross free intraperitoneal air. No significant retroperitoneal adenopathy. 10 mm calcific ring shadow in the anterior left lower quadrant, possible calcified lymph node or fat necrosis.   VESSELS:  Heavy aortic atherosclerotic calcifications. No abdominal aortic aneurysm.   PELVIS: Artifact from right hip prosthesis limits evaluation. Urinary bladder is moderately distended and grossly normal in contour as visualized. Heterogenous 8.0 x 4.8 cm left adnexal mass with nodular solid/enhancing and low-density cystic components, long axis oriented AP. The uterus is not separately visualized.   ABDOMINAL WALL: No sizable abdominal wall hernia. Small rounded presumed dystrophic calcification in the right gluteal subcutaneous fat.   BONES: Right hip arthroplasty. Diffuse osteopenia. Multifocal degenerative  changes.     THORACIC SPINE:   ALIGNMENT: Mild anterolisthesis at C7-T1 and T1-2. Mild S shaped scoliosis convex to the left proximally and to the right distally.   VERTEBRAE AND DISC SPACES: Diffuse osteopenia. Diffuse intervertebral disc space narrowing and endplate spurring. Multilevel smooth endplate depression/Schmorl's nodes with small vacuum phenomena distally. No gross central canal narrowing.   LUMBAR SPINE:   ALIGNMENT: Moderate levocurvature centered at L2-3. Mild retrolisthesis at L1-2 and L2-3.   VERTEBRAE AND DISC SPACES: Diffuse osteopenia. Diffuse intervertebral disc space narrowing relatively sparing L3-4 with multilevel vacuum discs, endplate sclerosis and spurring. Extensive bilateral facet joint narrowing and spurring bilaterally. 2 subcentimeter sclerotic foci in L3       CHEST:   Overall limited motion compromised exam. Bandlike possible infiltrate or atelectasis near the left main fissure. No sizable pleural effusion or pneumothorax.   ABDOMEN AND PELVIS:   No definite evidence of solid organ injury or free fluid within limits of the exam.   Heterogenous 8 cm left pelvic/adnexal mass without separate visualization of the uterus. Further evaluation with ultrasound or MRI recommended.   Small hypodense lesion in small/atrophic left kidney, possible cyst although not fully characterized on single phase exam.   Other findings as described above.   CT THORACIC AND LUMBAR SPINE:   No thoracic or lumbar vertebral compression deformity or acute displaced fracture.   Extensive multilevel degenerative changes with scoliosis and mild multilevel spondylolisthesis, as detailed above.   MACRO: None.   Signed by: Maren Victoria 4/16/2024 11:22 AM Dictation workstation:   [id number]    CT chest abdomen pelvis w IV contrast    Result Date: 4/16/2024  Interpreted By:  Maren Victoria, STUDY: CT CHEST ABDOMEN PELVIS W IV CONTRAST; CT LUMBAR SPINE WO IV CONTRAST; CT THORACIC SPINE WO IV CONTRAST;  4/16/2024 10:16  am; 4/16/2024 10:15 am   INDICATION: Signs/Symptoms:fall.   COMPARISON: None.   ACCESSION NUMBER(S): [id number]; [id number]; [id number]   ORDERING CLINICIAN: AGUILAR PELAEZ   TECHNIQUE: CT of the chest, abdomen, and pelvis was performed. Sagittal and coronal reconstructions were generated. Arterial phase images only were obtained. Axial, sagittal and coronal images of the thoracic and lumbar spine were generated.  75 cc Omnipaque 350 intravenous contrast given for the examination.   FINDINGS: Artifact related to motion and overlying upper extremities limits evaluation.     CHEST:       CHEST WALL AND LOWER NECK: No significant axillary lymphadenopathy.   MEDIASTINUM AND RAYSHAWN:  Mild gaseous distention of the proximal esophagus. No significant mediastinal or hilar adenopathy.   HEART AND VESSELS:  The heart is prominent. No significant pericardial effusion. Metallic stent in the region of the aortic valve/root. Multifocal atherosclerotic calcifications including the coronary arteries and mitral valve/annulus region. No thoracic aortic aneurysm.   LUNGS, PLEURA, LARGE AIRWAYS:  Respiratory motion artifact limits parenchymal evaluation. Elongated probable debris strand in the proximal trachea. Slight pulmonary heterogeneity. No confluent airspace opacity. Ill-defined thickening in the posterior left upper lobe along the main fissure. Mild probable dependent atelectasis in both lung bases. No sizable pleural effusion or pneumothorax.   BONES:  No definite acute displaced rib fracture.     ABDOMEN/PELVIS:       ABDOMINAL ORGANS:   LIVER: Subtle nodular hypodense presumed focal fatty infiltrate anteriorly in the left lobe of the liver near the falciform ligament.   GALL BLADDER AND BILIARY TREE: No sizable calcified gallstone or biliary dilatation within limits of the exam.   SPLEEN: No definite focal lesion within limits of the exam. Subcentimeter isodense probable splenule adjacent to the inferior pole.    PANCREAS: Tail is somewhat truncated. No definite focal lesion within limits of the exam.   ADRENALS: No adrenal mass   KIDNEYS AND URETERS: Mild left renal atrophy. Approximately 1 cm rounded hypodensity posteriorly in the left kidney. No hydronephrosis within limits of nephrogram phase images and motion.   BOWEL: No gross dilated large or small bowel loops. Gaseous distention of the rectum.   PERITONEUM, RETROPERITONEUM, NODES: Cul-de-sac is partially obscured. No significant free fluid as visualized. No gross free intraperitoneal air. No significant retroperitoneal adenopathy. 10 mm calcific ring shadow in the anterior left lower quadrant, possible calcified lymph node or fat necrosis.   VESSELS:  Heavy aortic atherosclerotic calcifications. No abdominal aortic aneurysm.   PELVIS: Artifact from right hip prosthesis limits evaluation. Urinary bladder is moderately distended and grossly normal in contour as visualized. Heterogenous 8.0 x 4.8 cm left adnexal mass with nodular solid/enhancing and low-density cystic components, long axis oriented AP. The uterus is not separately visualized.   ABDOMINAL WALL: No sizable abdominal wall hernia. Small rounded presumed dystrophic calcification in the right gluteal subcutaneous fat.   BONES: Right hip arthroplasty. Diffuse osteopenia. Multifocal degenerative changes.     THORACIC SPINE:   ALIGNMENT: Mild anterolisthesis at C7-T1 and T1-2. Mild S shaped scoliosis convex to the left proximally and to the right distally.   VERTEBRAE AND DISC SPACES: Diffuse osteopenia. Diffuse intervertebral disc space narrowing and endplate spurring. Multilevel smooth endplate depression/Schmorl's nodes with small vacuum phenomena distally. No gross central canal narrowing.   LUMBAR SPINE:   ALIGNMENT: Moderate levocurvature centered at L2-3. Mild retrolisthesis at L1-2 and L2-3.   VERTEBRAE AND DISC SPACES: Diffuse osteopenia. Diffuse intervertebral disc space narrowing relatively sparing  L3-4 with multilevel vacuum discs, endplate sclerosis and spurring. Extensive bilateral facet joint narrowing and spurring bilaterally. 2 subcentimeter sclerotic foci in L3       CHEST:   Overall limited motion compromised exam. Bandlike possible infiltrate or atelectasis near the left main fissure. No sizable pleural effusion or pneumothorax.   ABDOMEN AND PELVIS:   No definite evidence of solid organ injury or free fluid within limits of the exam.   Heterogenous 8 cm left pelvic/adnexal mass without separate visualization of the uterus. Further evaluation with ultrasound or MRI recommended.   Small hypodense lesion in small/atrophic left kidney, possible cyst although not fully characterized on single phase exam.   Other findings as described above.   CT THORACIC AND LUMBAR SPINE:   No thoracic or lumbar vertebral compression deformity or acute displaced fracture.   Extensive multilevel degenerative changes with scoliosis and mild multilevel spondylolisthesis, as detailed above.   MACRO: None.   Signed by: Maren Victoria 4/16/2024 11:22 AM Dictation workstation:   [id number]    CT cervical spine wo IV contrast    Result Date: 4/16/2024  Interpreted By:  Maren Victoria, STUDY: CT CERVICAL SPINE WO IV CONTRAST;  4/16/2024 10:15 am   INDICATION: Signs/Symptoms:fall.   COMPARISON: 03/08/2022   ACCESSION NUMBER(S): [id number]   ORDERING CLINICIAN: AGUILAR PELAEZ   TECHNIQUE: CT images were obtained through the cervical spine. Sagittal and coronal reconstructions were generated.   FINDINGS:     ALIGNMENT: Midcervical lordosis is straightened. Anterolisthesis of C7 with 4 mm of anterolisthesis at C7-T1 and mild anterolisthesis at T1-2. These findings are similar to the prior exam.   VERTEBRAE/DISC SPACES: No compression deformity or acute displaced fracture.  Diffuse osteopenia and multilevel productive/degenerative changes including atlantoaxial joint space narrowing with spurring and adjacent  calcifications/ossifications, multilevel intervertebral disc space narrowing and endplate spurring most prominent at C5-6 and C6-7 and multilevel bilateral facet joint narrowing and spurring similar to the previous exam. Small calcifications in the peripheral spinal canal also similar to the prior study   ADDITIONAL FINDINGS: No abnormal thickening of the prevertebral soft tissues. Nodular presumed cerumen in the left external auditory canal. Minimal patchy presumed scarring in both lung apices. Faint mucous strand in the mid trachea. Bilateral carotid bulb atherosclerotic calcifications.       No acute cervical vertebral displacement or acute displaced fracture. Alignment abnormalities and multilevel degenerative changes similar to 03/08/2022.   Paraspinal/soft tissue findings as above.   MACRO: None.   Signed by: Maren Victoria 4/16/2024 10:58 AM Dictation workstation:   [id number]    CT head wo IV contrast    Result Date: 4/16/2024  Interpreted By:  Maren Victoria, STUDY: CT HEAD WO IV CONTRAST;  4/16/2024 10:15 am   INDICATION: Signs/Symptoms:fall, head trauma.   COMPARISON: 01/21/2023   ACCESSION NUMBER(S): [id number]   ORDERING CLINICIAN: AGUILAR PELAEZ   TECHNIQUE: Unenhanced CT images of the head were obtained.   FINDINGS: The ventricles, cisterns and sulci are enlarged, consistent with diffuse volume loss. There are areas of nonspecific white matter hypodensity, which are probably age related or microvascular in nature. These findings are similar to the exam. There is no acute intracranial hemorrhage, mass effect or midline shift. No extraaxial fluid collection.   No acute displaced calvarial fracture.   Visualized paranasal sinuses are clear.       No acute intracranial hemorrhage or mass-effect.   MACRO: None.   Signed by: Maren Victoria 4/16/2024 10:53 AM Dictation workstation:   [id number]    XR pelvis 1-2 views    Result Date: 4/16/2024  Interpreted By:  Maren Victoria, STUDY: XR PELVIS 1-2 VIEWS; XR  FEMUR RIGHT 2+ VIEWS;  4/16/2024 7:51 am   INDICATION: Signs/Symptoms:fall, leg pain; Signs/Symptoms:fall, rt upper leg pain.   COMPARISON: 05/04/2022, 03/08/2022   ACCESSION NUMBER(S): [id number]; [id number]   ORDERING CLINICIAN: AGUILAR PELAEZ   FINDINGS: Frontal view of the pelvis and four views of the right femur obtained.   Osteopenia and overlying bowel content limit evaluation. No definite focal disruption of the pelvic osseous contours to suggest acute displaced fracture. Degenerative changes of right-greater-than-left SI joints, visualized lower lumbar spine and pubic symphysis. Right total hip arthroplasty with at least 2 screws fixing the acetabular component and long-stemmed proximal femoral prosthesis in grossly stable anatomic alignment. Ill-defined linear lucency of the overlying anterolateral proximal femoral cortex similar to the previous exam. Otherwise no abnormal lucency or acute fracture around the hardware. Stable smooth density superolateral to the femoral greater trochanter. Grossly intact alignment at the knee joint. Scattered soft tissue presumed vascular calcifications.       No definite acute displaced osseous pelvic fracture. Status post right DAISY with intact hardware. Subtle lucency of the proximal femoral cortex similar to 03/08/2022 likely sequela of remote trauma however acute fracture not entirely excluded. Correlation with overlying tenderness recommended, further evaluation with CT suggested if there is concern for acute fracture in this region.   MACRO: None.   Signed by: Maren Victoria 4/16/2024 8:37 AM Dictation workstation:   [id number]    XR femur right 2+ views    Result Date: 4/16/2024  Interpreted By:  Maren Victoria, STUDY: XR PELVIS 1-2 VIEWS; XR FEMUR RIGHT 2+ VIEWS;  4/16/2024 7:51 am   INDICATION: Signs/Symptoms:fall, leg pain; Signs/Symptoms:fall, rt upper leg pain.   COMPARISON: 05/04/2022, 03/08/2022   ACCESSION NUMBER(S): [id number]; [id number]   ORDERING  CLINICIAN: AGUILAR PELAEZ   FINDINGS: Frontal view of the pelvis and four views of the right femur obtained.   Osteopenia and overlying bowel content limit evaluation. No definite focal disruption of the pelvic osseous contours to suggest acute displaced fracture. Degenerative changes of right-greater-than-left SI joints, visualized lower lumbar spine and pubic symphysis. Right total hip arthroplasty with at least 2 screws fixing the acetabular component and long-stemmed proximal femoral prosthesis in grossly stable anatomic alignment. Ill-defined linear lucency of the overlying anterolateral proximal femoral cortex similar to the previous exam. Otherwise no abnormal lucency or acute fracture around the hardware. Stable smooth density superolateral to the femoral greater trochanter. Grossly intact alignment at the knee joint. Scattered soft tissue presumed vascular calcifications.       No definite acute displaced osseous pelvic fracture. Status post right DAISY with intact hardware. Subtle lucency of the proximal femoral cortex similar to 03/08/2022 likely sequela of remote trauma however acute fracture not entirely excluded. Correlation with overlying tenderness recommended, further evaluation with CT suggested if there is concern for acute fracture in this region.   MACRO: None.   Signed by: Maren Victoria 4/16/2024 8:37 AM Dictation workstation:   [id number]          Assessment:    89 year old female patient admitted to hospital found to have right total hip nondisplaced proximal femur periprosthetic fracture.     Images were reviewed independently and consistent with the above.     Upon assessment she endorses pain to her right groin and thigh. There is no shortening or external/internal rotation of extremity. Thigh is edematous but supple. Unable to perform SLR secondary to pain.     Further recommendations to follow.       Plan:  Further recommendations to follow consultation with attending  Pain control per  primary team  Bedrest until weightbearing status determined            Electronically signed by WAQAS Blackwell on 4/16/2024 at 1:36 PM

## 2024-04-17 VITALS
HEIGHT: 60 IN | HEART RATE: 91 BPM | TEMPERATURE: 97.7 F | DIASTOLIC BLOOD PRESSURE: 64 MMHG | OXYGEN SATURATION: 97 % | RESPIRATION RATE: 18 BRPM | BODY MASS INDEX: 21.68 KG/M2 | SYSTOLIC BLOOD PRESSURE: 144 MMHG | WEIGHT: 110.45 LBS

## 2024-04-17 LAB
ANION GAP SERPL CALC-SCNC: 10 MMOL/L (ref 10–20)
BUN SERPL-MCNC: 26 MG/DL (ref 6–23)
CALCIUM SERPL-MCNC: 8.5 MG/DL (ref 8.6–10.3)
CHLORIDE SERPL-SCNC: 104 MMOL/L (ref 98–107)
CO2 SERPL-SCNC: 27 MMOL/L (ref 21–32)
CREAT SERPL-MCNC: 0.81 MG/DL (ref 0.5–1.05)
EGFRCR SERPLBLD CKD-EPI 2021: 67 ML/MIN/1.73M*2
ERYTHROCYTE [DISTWIDTH] IN BLOOD BY AUTOMATED COUNT: 14.1 % (ref 11.5–14.5)
GLUCOSE SERPL-MCNC: 102 MG/DL (ref 74–99)
HCT VFR BLD AUTO: 25.5 % (ref 36–46)
HGB BLD-MCNC: 8.2 G/DL (ref 12–16)
MCH RBC QN AUTO: 27 PG (ref 26–34)
MCHC RBC AUTO-ENTMCNC: 32.2 G/DL (ref 32–36)
MCV RBC AUTO: 84 FL (ref 80–100)
NRBC BLD-RTO: 0 /100 WBCS (ref 0–0)
PLATELET # BLD AUTO: 149 X10*3/UL (ref 150–450)
POTASSIUM SERPL-SCNC: 4.3 MMOL/L (ref 3.5–5.3)
RBC # BLD AUTO: 3.04 X10*6/UL (ref 4–5.2)
SODIUM SERPL-SCNC: 137 MMOL/L (ref 136–145)
WBC # BLD AUTO: 6.9 X10*3/UL (ref 4.4–11.3)

## 2024-04-17 PROCEDURE — 36415 COLL VENOUS BLD VENIPUNCTURE: CPT

## 2024-04-17 PROCEDURE — 2500000006 HC RX 250 W HCPCS SELF ADMINISTERED DRUGS (ALT 637 FOR ALL PAYERS)

## 2024-04-17 PROCEDURE — 97161 PT EVAL LOW COMPLEX 20 MIN: CPT | Mod: GP | Performed by: PHYSICAL THERAPIST

## 2024-04-17 PROCEDURE — 2500000001 HC RX 250 WO HCPCS SELF ADMINISTERED DRUGS (ALT 637 FOR MEDICARE OP)

## 2024-04-17 PROCEDURE — 97165 OT EVAL LOW COMPLEX 30 MIN: CPT | Mod: GO

## 2024-04-17 PROCEDURE — 85027 COMPLETE CBC AUTOMATED: CPT

## 2024-04-17 PROCEDURE — 2500000004 HC RX 250 GENERAL PHARMACY W/ HCPCS (ALT 636 FOR OP/ED)

## 2024-04-17 PROCEDURE — 99222 1ST HOSP IP/OBS MODERATE 55: CPT | Performed by: STUDENT IN AN ORGANIZED HEALTH CARE EDUCATION/TRAINING PROGRAM

## 2024-04-17 PROCEDURE — 99232 SBSQ HOSP IP/OBS MODERATE 35: CPT

## 2024-04-17 PROCEDURE — 80048 BASIC METABOLIC PNL TOTAL CA: CPT

## 2024-04-17 RX ORDER — HYDROCODONE BITARTRATE AND ACETAMINOPHEN 5; 325 MG/1; MG/1
1 TABLET ORAL EVERY 4 HOURS PRN
Status: DISCONTINUED | OUTPATIENT
Start: 2024-04-17 | End: 2024-04-17 | Stop reason: HOSPADM

## 2024-04-17 RX ORDER — HYDROCODONE BITARTRATE AND ACETAMINOPHEN 5; 325 MG/1; MG/1
1 TABLET ORAL EVERY 6 HOURS PRN
Qty: 28 TABLET | Refills: 0 | Status: SHIPPED | OUTPATIENT
Start: 2024-04-17 | End: 2024-04-24

## 2024-04-17 RX ORDER — HYDROCODONE BITARTRATE AND ACETAMINOPHEN 5; 325 MG/1; MG/1
1 TABLET ORAL EVERY 6 HOURS PRN
Qty: 28 TABLET | Refills: 0 | Status: SHIPPED | OUTPATIENT
Start: 2024-04-17 | End: 2024-04-17

## 2024-04-17 RX ORDER — HYDROCODONE BITARTRATE AND ACETAMINOPHEN 10; 325 MG/1; MG/1
1 TABLET ORAL EVERY 4 HOURS PRN
Status: DISCONTINUED | OUTPATIENT
Start: 2024-04-17 | End: 2024-04-17 | Stop reason: HOSPADM

## 2024-04-17 RX ADMIN — RIVAROXABAN 10 MG: 10 TABLET, FILM COATED ORAL at 10:05

## 2024-04-17 RX ADMIN — HYDROCODONE BITARTRATE AND ACETAMINOPHEN 1 TABLET: 5; 325 TABLET ORAL at 10:05

## 2024-04-17 RX ADMIN — FLUTICASONE FUROATE AND VILANTEROL TRIFENATATE 1 PUFF: 100; 25 POWDER RESPIRATORY (INHALATION) at 06:14

## 2024-04-17 RX ADMIN — TIOTROPIUM BROMIDE INHALATION SPRAY 2 PUFF: 3.12 SPRAY, METERED RESPIRATORY (INHALATION) at 06:14

## 2024-04-17 RX ADMIN — POLYETHYLENE GLYCOL 3350 17 G: 17 POWDER, FOR SOLUTION ORAL at 10:06

## 2024-04-17 ASSESSMENT — COGNITIVE AND FUNCTIONAL STATUS - GENERAL
MOVING TO AND FROM BED TO CHAIR: A LOT
DRESSING REGULAR UPPER BODY CLOTHING: A LITTLE
DRESSING REGULAR UPPER BODY CLOTHING: A LITTLE
DRESSING REGULAR LOWER BODY CLOTHING: A LOT
DAILY ACTIVITIY SCORE: 14
DAILY ACTIVITIY SCORE: 14
HELP NEEDED FOR BATHING: A LOT
STANDING UP FROM CHAIR USING ARMS: A LOT
TURNING FROM BACK TO SIDE WHILE IN FLAT BAD: A LOT
MOVING FROM LYING ON BACK TO SITTING ON SIDE OF FLAT BED WITH BEDRAILS: A LOT
PERSONAL GROOMING: A LOT
MOBILITY SCORE: 10
EATING MEALS: A LITTLE
STANDING UP FROM CHAIR USING ARMS: A LOT
PERSONAL GROOMING: A LOT
TURNING FROM BACK TO SIDE WHILE IN FLAT BAD: A LOT
WALKING IN HOSPITAL ROOM: A LOT
EATING MEALS: A LITTLE
DRESSING REGULAR LOWER BODY CLOTHING: A LOT
MOBILITY SCORE: 12
MOVING FROM LYING ON BACK TO SITTING ON SIDE OF FLAT BED WITH BEDRAILS: A LOT
TOILETING: A LOT
WALKING IN HOSPITAL ROOM: TOTAL
CLIMB 3 TO 5 STEPS WITH RAILING: TOTAL
MOVING TO AND FROM BED TO CHAIR: A LOT
TOILETING: A LOT
CLIMB 3 TO 5 STEPS WITH RAILING: A LOT
HELP NEEDED FOR BATHING: A LOT

## 2024-04-17 ASSESSMENT — PAIN SCALES - GENERAL
PAINLEVEL_OUTOF10: 3
PAINLEVEL_OUTOF10: 1
PAINLEVEL_OUTOF10: 3

## 2024-04-17 ASSESSMENT — PAIN - FUNCTIONAL ASSESSMENT: PAIN_FUNCTIONAL_ASSESSMENT: 0-10

## 2024-04-17 ASSESSMENT — ACTIVITIES OF DAILY LIVING (ADL)
BATHING_ASSISTANCE: MAXIMAL
LACK_OF_TRANSPORTATION: NO

## 2024-04-17 NOTE — PROGRESS NOTES
04/17/24 1025   Discharge Planning   Living Arrangements Alone   Support Systems Children;Family members;Friends/neighbors   Assistance Needed Pt states PTA lives alone, ind ADLS no AD, family assist/completes IADLS, pt doesn't drive- family drives, fall PTA with Right non-displaced periprosthetic non-op hip fx, pt denies other falls. Pt owns walker, tub and walk-in shower with shower seat and grab bars both sides of shower   Type of Residence Private residence  (3 level home, states stays on 1st floor, laundry on 1st floor)   Number of Stairs to Enter Residence 3   Number of Stairs Within Residence 12   Do you have animals or pets at home? Yes   Type of Animals or Pets 1 small dog (currently with her son)   Home or Post Acute Services Post acute facilities (Rehab/SNF/etc)   Type of Post Acute Facility Services Rehab;Skilled nursing   Patient expects to be discharged to: [facility]   Does the patient need discharge transport arranged? No   Financial Resource Strain   How hard is it for you to pay for the very basics like food, housing, medical care, and heating? Not hard   Housing Stability   In the last 12 months, was there a time when you were not able to pay the mortgage or rent on time? N   In the last 12 months, how many places have you lived? 1   In the last 12 months, was there a time when you did not have a steady place to sleep or slept in a shelter (including now)? N   Transportation Needs   In the past 12 months, has lack of transportation kept you from medical appointments or from getting medications? no   In the past 12 months, has lack of transportation kept you from meetings, work, or from getting things needed for daily living? No   Patient Choice   Provider Choice list and CMS website (https://medicare.gov/care-compare#search) for post-acute Quality and Resource Measure Data were provided and reviewed with: Patient   Patient / Family choosing to utilize agency / facility established  prior to hospitalization No     Pt admitted after fall with right hip pain, x-ray shows nondisplaced right periprosthetic hip fracture. Ortho on consult and states non-operative, pt is 50% weight bearing, no abduction, walker assist at all times. Pt states PTA lives alone, ind ADLS no AD, family assist/completes IADLS, pt doesn't drive- family drives, fall PTA with Right non-displaced periprosthetic non-op hip fx, pt denies other falls. Pt owns walker, tub and walk-in shower with shower seat and grab bars both sides of shower. PT/OT eval [facility] scores are currently pending. Pt anticipates needing rehab at discharge and states facility of choice is [facility]. Carmela Liaison for [facility] notified and asked to look at case to see if pt meets criteria/acceptance. Awaiting answer. Pt states if accepted that her family can provide transportation to facility. CT team continuing to monitor case progression for DC planning.

## 2024-04-17 NOTE — PROGRESS NOTES
"Occupational Therapy    Evaluation/Treatment    Patient Name: Gracie Carlson pronounced \"Daisy ressa\"  MRN: [id number]  : 1928  Today's Date: 24  Time Calculation  Start Time: 1021  Stop Time: 1040  Time Calculation (min): 19 min       Assessment:  End of Session Patient Position: Up in chair, Alarm on (LE's elevated, call light in reach)  OT Assessment Results: Decreased ADL status, Decreased endurance, Decreased functional mobility    Plan:  Treatment Interventions: ADL retraining, Functional transfer training, Endurance training  OT Frequency: 2 times per week  OT Discharge Recommendations: Moderate intensity level of continued care  Equipment Recommended upon Discharge: Wheeled walker, Wheelchair, Bedside commode  OT Recommended Transfer Status: Moderate assist, Assist of 2  OT - OK to Discharge: Yes  Treatment Interventions: ADL retraining, Functional transfer training, Endurance training  Subjective               General:   OT Received On: 24  General  Reason for Referral: s/p Right periprosthetic fracture femur 24 nonsurgical  Referred By: PT/OT 24 Ivette; 50% weight bearing R LE NO ACTIVE R HIP ABDUCTION SHILO Nathan  Past Medical History Relevant to Rehab: Asthma, HLD, HTN, Anemia, CAD, hypothyroidism, Dysphonia, RA, PTCA, Hyster, Valvular heart disease, tAVR, DAISY  Prior to Session Communication: PCT/NA/CTA  Patient Position Received: Up in chair, Alarm on (call light in reach, LE's elevated)  General Comment: To ED 24 sp fall. Got up to go to bathroom. Tripped. Hit head on dresser. Denies LOC. H/H 8.2/25.5; cT 24 Cervical spine (-); Head (-); Right hip s/p right DAISY acute nondisplaced periprosthetic fracture; Thoracic/lumbar (-); xR 24 Right femur s/p Right DAISY    Precautions:  Hearing/Visual Limitations: [place]; Glasses  LE Weight Bearing Status:  (50% WB R LE)  Medical Precautions: Fall precautions  Precautions Comment: NO ACTIVE R hip ABDUCTION       "     Pain:  Pain Assessment  Pain Score: 3  Pain Type: Acute pain  Pain Location: Hip  Pain Orientation: Right  Objective     Cognition:  Overall Cognitive Status:  (Easily distracted, cues to redirect)  Orientation Level:  (Oriented to person, place and situation)  Processing Speed: Delayed             Home Living:  Home Living Comments: Per patient report resides with dog in 2 story with basement resides on main level no steps to enter. Walk in shower with seat and grab bar.    Prior Function:  Prior Function Comments: Per patient report  independent in mobility, ADLs and IADLs. Reports one fall (Cause of current adm). Does not drive.           Activities of Daily Living:   Eating Assistance: Independent  Grooming Assistance: Minimal  Bathing Assistance: Maximal  UE Dressing Assistance: Minimal  LE Dressing Assistance: Maximal  Toileting Assistance with Device: Maximal  Functional Assistance:  (pt ambulated 3' forward and backward with ww and gait belt, mod A x 2)                         Activity Tolerance:  Endurance:  (pt fatigues quickly in standing)           Bed Mobility/Transfers: Bed Mobility  Bed Mobility: No  Transfers  Transfer:  (Sit <> stand at chair level with vc for hand placement. Gait belt donned + 2 moderate asist . Tends to load through RLE fully with transition. Limited UB strength to offload)                Balance:  Static Sitting: good  Dynamic Sitting: fair  Static Standing: fair   Dynamic Standing: poor        Vision:Vision - Basic Assessment  Current Vision: Wears glasses all the time               Strength:  Strength Comments: B UE 4/5 throughout, crepitus noted B shoulders        Extremities: RUE   RUE : Within Functional Limits and LUE   LUE: Within Functional Limits    Outcome Measures: [facility] Daily Activity  Putting on and taking off regular lower body clothing: A lot  Bathing (including washing, rinsing, drying): A lot  Putting on and taking off regular upper body clothing: A  little  Toileting, which includes using toilet, bedpan or urinal: A lot  Taking care of personal grooming such as brushing teeth: A lot  Eating Meals: A little  Daily Activity - Total Score: 14    Education Documentation  ADL Training, taught by Arelis Forrester OT at 4/17/2024 11:47 AM.  Learner: Patient  Readiness: Acceptance  Method: Explanation  Response: Verbalizes Understanding, Needs Reinforcement                 Goals:  Encounter Problems       Encounter Problems (Active)       OT Goals       Pt will dress LB with SBA (Progressing)       Start:  04/17/24    Expected End:  05/01/24            Pt will transfer to bed, chair, toilet with CGA (Progressing)       Start:  04/17/24    Expected End:  05/01/24            Pt will demo fair + dyn std balance with ADLS (Progressing)       Start:  04/17/24    Expected End:  05/01/24            Pt will tolerate 8 minutes of functional activity with one rest break (Progressing)       Start:  04/17/24    Expected End:  05/01/24

## 2024-04-17 NOTE — PROGRESS NOTES
Physical Therapy    Physical Therapy Evaluation    Patient Name: Gracie Carlson  MRN: [id number]  Today's Date: 4/17/2024   Time Calculation  Start Time: 1020  Stop Time: 1040  Time Calculation (min): 20 min    Assessment/Plan   PT Assessment  PT Assessment Results: Decreased strength, Decreased endurance, Impaired balance, Decreased mobility, Decreased safety awareness, Impaired judgement  Rehab Prognosis: Good  Evaluation/Treatment Tolerance: Patient limited by fatigue  Assessment Comment: Patient will benefit from additional PT to increase strength, mobility and reinforce precautions. Instruct in HEP  End of Session Patient Position: Up in chair, Alarm on (LEs elevated)  IP OR SWING BED PT PLAN  Inpatient or Swing Bed: Inpatient  PT Plan  Treatment/Interventions: Bed mobility, Transfer training, Gait training, Strengthening, Therapeutic exercise, Therapeutic activity  PT Plan: Skilled PT  PT Frequency: Daily  PT Discharge Recommendations:  (Patient will benefit from additonal PT at moderate intensity and high frequency in 24/7 facility. Patient resides alone and is high fall risk)  Equipment Recommended upon Discharge: Wheeled walker, Wheelchair  PT Recommended Transfer Status: Assist x2  PT - OK to Discharge: (to approprte facility with continued PT once deemed medcally appropriate)      Subjective   General Visit Information:  General  Reason for Referral: s/p Right periprosthetic fracture femur 4/16/24 nonsurgical  Referred By: PT/OT 4/16/24 Ivette; 50% weight bearing NO ACTIVE HIP ABDUCTION SHILO Nathan  Past Medical History Relevant to Rehab: Asthma, HLD, HTN, Anemia, CAD, hypothyroidism, Dysphonia, RA, PTCA, Hyster, Valvular heart disease, tAVR, DAISY  Prior to Session Communication: PCT/NA/CTA  Patient Position Received: Up in chair, Alarm on (LEs elevatd)  General Comment: To ED 4/16/24 sp fall. Got up to go to bathroom. Tripped. Hit head on dresser. Denies LOC. H/H 8.2/25.5; cT 4/16/24 Cervical spine  (-); Head (-); Right hip s/p right DAISY acute nondisplaced periprosthetic fracture; Thoracic/lumbar (-); xR 4/16/24 Right femur s/p Right DAISY  Home Living:  Home Living  Home Living Comments: Per patient report resides with dog in 2 story with basement resides on main level no steps to enter. Walk in shower with seat adn grab bar.  Prior Level of Function:  Prior Function Per Pt/Caregiver Report  Prior Function Comments: Per patient report  independent in mobility, ADLs and IADLs. Reports only this fall. Does not drive.  Precautions:  Precautions  Hearing/Visual Limitations: [place]; Glasses  LE Weight Bearing Status:  (50% WB RLE)  Medical Precautions: Fall precautions  Post-Surgical Precautions:  (NO ACTIVE HIP ABDUCTION)       Objective   Pain:  Pain Assessment  Pain Score: 3  Pain Type: Acute pain  Pain Location: Hip  Pain Orientation: Right  Cognition:  Cognition  Overall Cognitive Status:  (Confused easily distractible. Cues to stay on task)    General Assessments:  General Observation  General Observation: patient sitting up in recliner. Agreeable to PT/OT     Activity Tolerance  Endurance:  (Fair)    Static Sitting Balance  Static Sitting-: Good  Dynamic Sitting Balance  Dynamic Sitting-s: Fair    Static Standing Balance  Static Standing-: Fair  Dynamic Standing Balance  Dynamic Standing- Poor  Functional Assessments:       Transfers  Transfer:  (Sit <> stand at chair level with vc for hand placement. Gait belt donned + 2 moderate asist . Tends to load through RLE fully with transition. Limited UB strength to offload)    Ambulation/Gait Training  Ambulation/Gait Training Performed:  (Patient ambulated 2' forward /backward using ww for support + 2 moderate assist. Patient tends to load through RLE fully  Decreased step length and height. Use of gait belt.)  Extremity/Trunk Assessments:  RUE   RUE : Within Functional Limits  LUE   LUE: Within Functional Limits  RLE   RLE :  (AROM Hip/knee/ankle WFL MMT ankle   dorsiflexors 4/5)  LLE   LLE :  (AROM Hip/knee/ankle WFL MMT 4/5 grossly)  Outcome Measures:  [facility] Basic Mobility  Turning from your back to your side while in a flat bed without using bedrails: A lot  Moving from lying on your back to sitting on the side of a flat bed without using bedrails: A lot  Moving to and from bed to chair (including a wheelchair): A lot  Standing up from a chair using your arms (e.g. wheelchair or bedside chair): A lot  To walk in hospital room: Total  Climbing 3-5 steps with railing: Total  Basic Mobility - Total Score: 10    Encounter Problems       Encounter Problems (Active)       PT Problem       Bed mobility supine <> sit supervised  (Not Progressing)       Start:  04/17/24    Expected End:  05/01/24            Transfers sit <> stand/bed<> chair with ww + 1 minimal assist (Progressing)       Start:  04/17/24    Expected End:  05/01/24            Patient to mobilize wheelchair > 100' around obstacles supervised.  (Progressing)       Start:  04/17/24    Expected End:  05/01/24               PT Problem       Patient independent in HEP (Not Progressing)       Start:  04/17/24    Expected End:  05/01/24               Safety              Education Documentation  Precautions, taught by Lisha Urena PT at 4/17/2024 11:07 AM.  Learner: Patient  Readiness: Acceptance  Method: Explanation, Demonstration  Response: Needs Reinforcement    Mobility Training, taught by Lisha Urena PT at 4/17/2024 11:07 AM.  Learner: Patient  Readiness: Acceptance  Method: Explanation, Demonstration  Response: Needs Reinforcement

## 2024-04-17 NOTE — DISCHARGE INSTRUCTIONS
50% weightbearing to right lower extremity with the use of walker for assistance. No hip abduction exercises.   Xarelto 10 mg daily for 28 days. Started on 4/16/24.  Follow up with Dr. Nathan in office in 3 weeks. Please call office to schedule this appointment.       Thank you for choosing [facility]. It has been a pleasure taking part in your medical care. Please follow up with your primary care provider as instructed. If your symptoms should persist or worsen, please contact your primary care physician, or in the case of an emergency proceed to the nearest Emergency Room for further care. If you have any questions about the care you received, please call [facility] at [phone]. Thank you again! BETO Patel

## 2024-04-17 NOTE — PROGRESS NOTES
DAILY PROGRESS NOTE    S/p mechanical fall resulting in right total hip nondisplaced proximal femur periprosthetic fracture.     Visit Vitals  /64   Pulse 91   Temp 36.5 °C (97.7 °F)   Resp 18      Temp (24hrs), Av.6 °C (97.8 °F), Min:36.4 °C (97.5 °F), Max:36.8 °C (98.2 °F)       No chest pain or shortness of breath.  No calf pain    Exam:   Strength and sensation to bilateral extremities 5/5.  Extremity shows neuro vascular status intact. Flexion and extension intact on extremity.  Calves soft and non-tender without evidence of DVT.        Labs reviewed:  Recent Results (from the past 24 hour(s))   CBC    Collection Time: 24  5:31 AM   Result Value Ref Range    WBC 6.9 4.4 - 11.3 x10*3/uL    nRBC 0.0 0.0 - 0.0 /100 WBCs    RBC 3.04 (L) 4.00 - 5.20 x10*6/uL    Hemoglobin 8.2 (L) 12.0 - 16.0 g/dL    Hematocrit 25.5 (L) 36.0 - 46.0 %    MCV 84 80 - 100 fL    MCH 27.0 26.0 - 34.0 pg    MCHC 32.2 32.0 - 36.0 g/dL    RDW 14.1 11.5 - 14.5 %    Platelets 149 (L) 150 - 450 x10*3/uL   Basic metabolic panel    Collection Time: 24  5:31 AM   Result Value Ref Range    Glucose 102 (H) 74 - 99 mg/dL    Sodium 137 136 - 145 mmol/L    Potassium 4.3 3.5 - 5.3 mmol/L    Chloride 104 98 - 107 mmol/L    Bicarbonate 27 21 - 32 mmol/L    Anion Gap 10 10 - 20 mmol/L    Urea Nitrogen 26 (H) 6 - 23 mg/dL    Creatinine 0.81 0.50 - 1.05 mg/dL    eGFR 67 >60 mL/min/1.73m*2    Calcium 8.5 (L) 8.6 - 10.3 mg/dL       I&O  I/O last 3 completed shifts:  In: - (0 mL/kg)   Out: 175 (3.5 mL/kg) [Urine:175 (0.1 mL/kg/hr)]  Weight: 50.1 kg       Assessment:    S/p mechanical fall resulting in right total hip nondisplaced proximal femur periprosthetic fracture. Non surgical treatment needed.     Reports slight increase to pain to right lower extremity this morning.  Exacerbated by movement. Will adjust pain medication. Otherwise patient is doing well this morning and reports being ready to work with therapy today.     Plan:     PT/OT: 50% weightbearing to right lower extremity with the use of walker for assistance. No abduction exercises.   Fall prevention  DVT/PE prophylaxis: Xarelto 10 mg daily for 28 days  Continue pain control. Apply ice to right hip.  Discharge planning: Acute Rehab placement. TCC and SW following for discharge planning.  Follow up with Dr. Nathan in the office in 3 weeks.       SARIKA Blackwell-CNP   4/17/2024 9:58 AM

## 2024-04-17 NOTE — DISCHARGE SUMMARY
Discharge Diagnosis  Fall, initial encounter    Issues Requiring Follow-Up  none    Discharge Meds     Your medication list        START taking these medications        Instructions Last Dose Given Next Dose Due   HYDROcodone-acetaminophen 5-325 mg tablet  Commonly known as: Norco      Take 1 tablet by mouth every 6 hours if needed for moderate pain (4 - 6) or severe pain (7 - 10) for up to 7 days.       rivaroxaban 10 mg tablet  Commonly known as: Xarelto  Start taking on: April 18, 2024      Take 1 tablet (10 mg) by mouth once daily for 27 days.              CONTINUE taking these medications        Instructions Last Dose Given Next Dose Due   ascorbic acid (vitamin C) 500 mg capsule           aspirin 81 mg chewable tablet           atorvastatin 20 mg tablet  Commonly known as: Lipitor           bisacodyl 5 mg EC tablet  Commonly known as: Dulcolax           fluticasone-umeclidin-vilanter 100-62.5-25 mcg blister with device  Commonly known as: TRELEGY-ELLIPTA           losartan 50 mg tablet  Commonly known as: Cozaar           meclizine 12.5 mg tablet  Commonly known as: Antivert           Miralax 17 gram/dose powder  Generic drug: polyethylene glycol           Nitrostat 0.4 mg SL tablet  Generic drug: nitroglycerin           VITAMIN D2 ORAL                     Where to Get Your Medications        You can get these medications from any pharmacy    Bring a paper prescription for each of these medications  HYDROcodone-acetaminophen 5-325 mg tablet       Information about where to get these medications is not yet available    Ask your nurse or doctor about these medications  rivaroxaban 10 mg tablet         Test Results Pending At Discharge  Pending Labs       No current pending labs.            Hospital Course  Gracie Carlson is a 89 y.o. female presenting with right hip pain status fall.  Patient states she got up to go to the bathroom this morning and tripped causing her to fall.  Patient did hit her head on  the dresser denies LOC or headache.  Patient is not on a blood thinner.  Patient complained of right thigh and hip pain called her son who called EMS.  Patient had a fall 2 years ago with the same hip which happens to be a prosthetic, no surgical intervention went to rehab and recovered.  Ortho saw patient in the ED and is recommending PT to see her in the a.m. and evaluate.  Patient lives at home alone with her dog.  Patient denies SOB or chest pain.  Patient denies abdominal pain, nausea or vomiting.  Denies fever or chills.  Patient did receive Tylenol in the ER and which did help with her pain.  CBC and BMP unremarkable.  X-ray of the femur shows s/p right DAISY with intact hardware.  Subtle lucency of the proximal femoral cortex similar to 3/8/2022 likely sequela of remote trauma.  Patient resting comfortably and cooperative and will be admitted for further evaluation.  Patient was admitted and seen by Ortho no surgical intervention at this time.  Patient was up with physical therapy and recommend acute rehab.  Patient will follow-up with Ortho as scheduled.  On day of discharge patient hemodynamically stable and being discharged to  acute rehab.    Pertinent Physical Exam At Time of Discharge  Physical Exam  Constitutional:       Comments: Frail-appearing   HENT:      Head: Normocephalic.      Mouth/Throat:      Mouth: Mucous membranes are moist.   Eyes:      Pupils: Pupils are equal, round, and reactive to light.   Cardiovascular:      Rate and Rhythm: Normal rate and regular rhythm.      Heart sounds: Normal heart sounds, S1 normal and S2 normal.   Pulmonary:      Effort: Pulmonary effort is normal.      Breath sounds: Normal breath sounds.   Abdominal:      General: Bowel sounds are normal.      Palpations: Abdomen is soft.   Musculoskeletal:         General: Normal range of motion.      Cervical back: Neck supple.      Comments: RLE slightly externally rotated   Skin:     General: Skin is warm.    Neurological:      Mental Status: She is alert and oriented to person, place, and time.      Motor: Weakness present.   Psychiatric:         Mood and Affect: Mood normal.         Behavior: Behavior normal.     Outpatient Follow-Up  Future Appointments   Date Time Provider Department Center   6/19/2024 10:00 AM ALEJANDRO [id number] ECHO/VASC 4 [id number] [facility]   6/26/2024 10:00 AM Jose Harris MD [id number] [place]         Amanda Steele APRN-CNP

## 2024-04-17 NOTE — HOSPITAL COURSE
Gracie Carlson is a 89 y.o. female presenting with right hip pain status fall.  Patient states she got up to go to the bathroom this morning and tripped causing her to fall.  Patient did hit her head on the dresser denies LOC or headache.  Patient is not on a blood thinner.  Patient complained of right thigh and hip pain called her son who called EMS.  Patient had a fall 2 years ago with the same hip which happens to be a prosthetic, no surgical intervention went to rehab and recovered.  Ortho saw patient in the ED and is recommending PT to see her in the a.m. and evaluate.  Patient lives at home alone with her dog.  Patient denies SOB or chest pain.  Patient denies abdominal pain, nausea or vomiting.  Denies fever or chills.  Patient did receive Tylenol in the ER and which did help with her pain.  CBC and BMP unremarkable.  X-ray of the femur shows s/p right DAISY with intact hardware.  Subtle lucency of the proximal femoral cortex similar to 3/8/2022 likely sequela of remote trauma.  Patient resting comfortably and cooperative and will be admitted for further evaluation.  Patient was admitted and seen by Ortho no surgical intervention at this time.  Patient was up with physical therapy and recommend acute rehab.  Patient will follow-up with Ortho as scheduled.  On day of discharge patient hemodynamically stable and being discharged to  acute rehab.

## 2024-04-17 NOTE — CONSULTS
"Nutrition Initial Assessment:   Nutrition Assessment         Patient is a 89 y.o. female presenting with fall      Nutrition History:  Energy Intake: Fair 50-75 %  Food and Nutrient History: RD consulted for MST = 3. Pt reports poor appetite for several weeks to months, suspects poor appetite r/t aging process. Has been eating 3 meals per day. Likes oatmeal with brown sugar for breakfast. Lunch is a sandwich. Dinner is a frozen meal. Consumes \"a few\" Ensure per week, one every other day. Goes out to restaurants for lunch a few times per week. Reports eating oatmeal for breakfast this morning with brown sugar. Dislikes scrambled eggs, notified kitchen. Requesting 1 chocolate Ensure per day, ordered.  Vitamin/Herbal Supplement Use: vitamin C, vitamin D  Food Allergies/Intolerances:  None  GI Symptoms: None  Oral Problems:  Pt denies. Has lower partials at home.       Anthropometrics:  Height: 152.4 cm (5')   Weight: 50.1 kg (110 lb 7.2 oz)   BMI (Calculated): 21.57             Weight History:     Wt Readings from Last 10 Encounters:   04/16/24 50.1 kg (110 lb 7.2 oz)   10/18/23 54.4 kg (120 lb)   06/14/23 51.9 kg (114 lb 7 oz)   06/15/22 50.5 kg (111 lb 6.4 oz)   05/12/21 54.4 kg (120 lb)   05/06/20 54.4 kg (120 lb)     4/19/23: 53.1 kg  6/26/23: 52.6 kg  9/19/23: 51.3 kg  3/19/24: 50.1 kg    Weight Change %:  Significant Weight Loss: No    Nutrition Focused Physical Exam Findings:    Subcutaneous Fat Loss:   Orbital Fat Pads: Well nourished (slightly bulging fat pads)  Buccal Fat Pads: Well nourished (full, rounded cheeks)  Triceps: Well nourished (ample fat tissue)  Ribs: Defer  Muscle Wasting:  Temporalis: Mild-Moderate (slight depression)  Pectoralis (Clavicular Region): Mild-Moderate (some protrusion of clavicle)  Deltoid/Trapezius: Mild-Moderate (slight protrusion of acromion process)  Interosseous: Mild-Moderate (slightly depressed area between thumb and forefinger)  Trapezius/Infraspinatus/Supraspinatus " (Scapular Region): Defer  Quadriceps: Defer  Gastrocnemius: Defer  Edema:  Edema: none  Physical Findings:  Skin: Positive (skin tear)    Nutrition Significant Labs:  BMP Trend:   Results from last 7 days   Lab Units 04/17/24  0531 04/16/24  0807   GLUCOSE mg/dL 102* 112*   CALCIUM mg/dL 8.5* 8.9   SODIUM mmol/L 137 139   POTASSIUM mmol/L 4.3 4.1   CO2 mmol/L 27 27   CHLORIDE mmol/L 104 106   BUN mg/dL 26* 28*   CREATININE mg/dL 0.81 0.82        Nutrition Specific Medications:  Reviewed    I/O:    ;      Dietary Orders (From admission, onward)       Start     Ordered    04/17/24 1100  Oral nutritional supplements  Until discontinued        Comments: Chocolate   Question Answer Comment   Deliver with Lunch    Select supplement: Ensure Plus High Protein        04/17/24 1059    04/16/24 1642  May Participate in Room Service  Once        Question:  .  Answer:  Yes    04/16/24 1641    04/16/24 1628  Adult diet Regular  Diet effective now        Question:  Diet type  Answer:  Regular    04/16/24 1627                     Estimated Needs:   Total Energy Estimated Needs (kCal): 1375 kCal  Method for Estimating Needs: 1206-2610 kcal (25-30 kcal/kg)  Total Protein Estimated Needs (g): 55 g  Method for Estimating Needs: 50-60g (1-1.2 g/kg)   Total Fluid Estimated Needs (ml): 1375 ml  Method for Estimating Needs: 1 ml/kcal or per MD        Nutrition Diagnosis   Malnutrition Diagnosis  Patient has Malnutrition Diagnosis: No (suspect muscle losses r/t aging)    Nutrition Diagnosis  Patient has Nutrition Diagnosis: No       Nutrition Interventions/Recommendations         Nutrition Prescription:  Individualized Nutrition Prescription Provided for : Regular diet. Oral nutritional supplements.        Nutrition Interventions:   Interventions: Meals and snacks, Medical food supplement  Goal: Consumes 3 meals per day  Medical Food Supplement: Commercial beverage  Goal: Ensure Plus High Protein daily (provides 350 kcal, 20 g protein per  serving)    Nutrition Education:      Denies need for diet education    Nutrition Monitoring and Evaluation   Food/Nutrient Related History Monitoring  Monitoring and Evaluation Plan: Energy intake, Amount of food, Fluid intake  Energy Intake: Estimated energy intake  Criteria: Pt meets >75% of estimated energy needs  Fluid Intake: Estimated fluid intake  Criteria: Meets >75% of estimated fluid needs  Amount of Food: Estimated amout of food, Medical food intake  Criteria: Pt consumes >50% of meals and supplements    Body Composition/Growth/Weight History  Monitoring and Evaluation Plan: Weight  Weight: Measured weight  Criteria: Maintains stable weight    Biochemical Data, Medical Tests and Procedures  Monitoring and Evaluation Plan: Glucose/endocrine profile, Electrolyte/renal panel  Electrolyte and Renal Panel: Sodium, Potassium, Phosphorus  Criteria: Electrolytes WNL  Glucose/Endocrine Profile: Glucose, casual  Criteria: BG within desirable range            Time Spent/Follow-up Reminder:   Time Spent (min): 45 minutes  Last Date of Nutrition Visit: 04/17/24  Nutrition Follow-Up Needed?: 5-7 days

## 2024-04-17 NOTE — CARE PLAN
Problem: Pain  Goal: Walks with improved pain control throughout the shift  4/17/2024 0955 by Araceli Lloyd RN  Outcome: Not Progressing  4/17/2024 0953 by Araceli Lloyd RN  Outcome: Progressing     Problem: Skin  Goal: Decreased wound size/increased tissue granulation at next dressing change  4/17/2024 0955 by Araceli Lloyd RN  Outcome: Not Progressing  4/17/2024 0953 by Araceli Lloyd RN  Outcome: Progressing  Goal: Promote/optimize nutrition  4/17/2024 0955 by Araceli Lloyd RN  Outcome: Not Progressing  4/17/2024 0953 by Araceli Lloyd RN  Outcome: Progressing     Problem: Pain  Goal: Takes deep breaths with improved pain control throughout the shift  4/17/2024 0955 by Araceli Lloyd RN  Outcome: Progressing  4/17/2024 0953 by Araceli Lloyd RN  Outcome: Progressing  Goal: Turns in bed with improved pain control throughout the shift  4/17/2024 0955 by Araceli Lloyd RN  Outcome: Progressing  4/17/2024 0953 by Araceli Lloyd RN  Outcome: Progressing  Goal: Performs ADL's with improved pain control throughout shift  4/17/2024 0955 by Araceli Lloyd RN  Outcome: Progressing  4/17/2024 0953 by Araceli Lloyd RN  Outcome: Progressing  Goal: Participates in PT with improved pain control throughout the shift  4/17/2024 0955 by Araceli Lloyd RN  Outcome: Progressing  4/17/2024 0953 by Araceli Lloyd RN  Outcome: Progressing  Goal: Free from opioid side effects throughout the shift  4/17/2024 0955 by Araceli Lloyd RN  Outcome: Progressing  4/17/2024 0953 by Araceli Lloyd RN  Outcome: Progressing  Goal: Free from acute confusion related to pain meds throughout the shift  4/17/2024 0955 by Araceli Lloyd RN  Outcome: Progressing  4/17/2024 0953 by Araceli Lloyd RN  Outcome: Progressing       Problem: Skin  Goal: Participates in plan/prevention/treatment measures  4/17/2024 0955 by Araceli Lloyd RN  Outcome: Progressing  4/17/2024 0953 by Araceli Lloyd  RN  Outcome: Progressing  Goal: Prevent/manage excess moisture  4/17/2024 0955 by Araceli Lloyd RN  Outcome: Progressing  4/17/2024 0953 by Araceli Lloyd RN  Outcome: Progressing  Goal: Prevent/minimize sheer/friction injuries  4/17/2024 0955 by Araceli Lloyd RN  Outcome: Progressing  4/17/2024 0953 by Araceli Lloyd RN  Outcome: Progressing  Goal: Promote skin healing  4/17/2024 0955 by Araceli Lloyd RN  Outcome: Progressing  4/17/2024 0953 by Araceli Lloyd RN  Outcome: Progressing     Problem: Skin  Goal: Participates in plan/prevention/treatment measures  4/17/2024 0955 by Araceli Lloyd RN  Outcome: Progressing  4/17/2024 0953 by Araceli Lloyd RN  Outcome: Progressing     Problem: Skin  Goal: Prevent/manage excess moisture  4/17/2024 0955 by Araceli Lloyd RN  Outcome: Progressing  4/17/2024 0953 by Araceli Lloyd RN  Outcome: Progressing     Problem: Skin  Goal: Prevent/minimize sheer/friction injuries  4/17/2024 0955 by Araceli Lloyd RN  Outcome: Progressing  4/17/2024 0953 by Araceli Lloyd RN  Outcome: Progressing     Problem: Skin  Goal: Promote skin healing  4/17/2024 0955 by Araceli Lloyd RN  Outcome: Progressing  4/17/2024 0953 by Araceli Lloyd RN  Outcome: Progressing   The patient's goals for the shift include      The clinical goals for the shift include decrease pain, increase activity    Problem: Pain  Goal: Walks with improved pain control throughout the shift  4/17/2024 0955 by Araceli Lloyd RN  Outcome: Not Progressing  4/17/2024 0953 by Araceli Lloyd RN  Outcome: Progressing     Problem: Skin  Goal: Decreased wound size/increased tissue granulation at next dressing change  4/17/2024 0955 by Araceli Lloyd RN  Outcome: Not Progressing  4/17/2024 0953 by Araceli Lloyd RN  Outcome: Progressing  Goal: Promote/optimize nutrition  4/17/2024 0955 by Araceli Lloyd RN  Outcome: Not Progressing  4/17/2024 0953 by Araceli Lloyd  RN  Outcome: Progressing

## 2024-04-17 NOTE — PROGRESS NOTES
04/17/24 1407   Discharge Planning   Home or Post Acute Services Post acute facilities (Rehab/SNF/etc)   Type of Post Acute Facility Services Rehab;Skilled nursing   Patient expects to be discharged to: [facility]   Does the patient need discharge transport arranged? No     [facility] scores are PT (10) OT (14) and recommend continued therapy at mod-high level/intensity. [facility] Acute rehab has confirmed able to accept, and per CNP pt is medically ready to DC today. Pts daughter-in-law Priti [phone] will transport pt to [place]. Pts RN given report number to [facility] [phone].

## 2024-04-17 NOTE — CARE PLAN
Problem: Pain  Goal: Takes deep breaths with improved pain control throughout the shift  Outcome: Progressing  Goal: Turns in bed with improved pain control throughout the shift  Outcome: Progressing  Goal: Walks with improved pain control throughout the shift  Outcome: Progressing  Goal: Performs ADL's with improved pain control throughout shift  Outcome: Progressing  Goal: Participates in PT with improved pain control throughout the shift  Outcome: Progressing  Goal: Free from opioid side effects throughout the shift  Outcome: Progressing  Goal: Free from acute confusion related to pain meds throughout the shift  Outcome: Progressing     Problem: Renal Failure  Goal: I will maintain optimum fluid volume with treatment  Outcome: Progressing     Problem: Skin  Goal: Decreased wound size/increased tissue granulation at next dressing change  Outcome: Progressing  Goal: Participates in plan/prevention/treatment measures  Outcome: Progressing  Goal: Prevent/manage excess moisture  Outcome: Progressing  Goal: Prevent/minimize sheer/friction injuries  Outcome: Progressing  Goal: Promote/optimize nutrition  Outcome: Progressing  Goal: Promote skin healing  Outcome: Progressing   The patient's goals for the shift include      The clinical goals for the shift include tolerable pain    Over the shift, the patient did not make progress toward the following goals. Barriers to progression include . Recommendations to address these barriers include .

## 2024-04-19 ENCOUNTER — APPOINTMENT (OUTPATIENT)
Dept: RADIOLOGY | Facility: HOSPITAL | Age: 89
DRG: 378 | End: 2024-04-19
Payer: MEDICARE

## 2024-04-19 ENCOUNTER — APPOINTMENT (OUTPATIENT)
Dept: CARDIOLOGY | Facility: HOSPITAL | Age: 89
DRG: 378 | End: 2024-04-19
Payer: MEDICARE

## 2024-04-19 ENCOUNTER — HOSPITAL ENCOUNTER (INPATIENT)
Facility: HOSPITAL | Age: 89
LOS: 5 days | Discharge: ANOTHER HOSPITAL | DRG: 378 | End: 2024-04-24
Attending: EMERGENCY MEDICINE | Admitting: INTERNAL MEDICINE
Payer: MEDICARE

## 2024-04-19 DIAGNOSIS — M97.8XXS PERIPROSTHETIC FRACTURE OF HIP, SEQUELA: ICD-10-CM

## 2024-04-19 DIAGNOSIS — N17.9 ACUTE KIDNEY INJURY (CMS-HCC): ICD-10-CM

## 2024-04-19 DIAGNOSIS — K92.1 HEMATOCHEZIA: ICD-10-CM

## 2024-04-19 DIAGNOSIS — K62.5 BRIGHT RED BLOOD PER RECTUM: Primary | ICD-10-CM

## 2024-04-19 DIAGNOSIS — K92.2 GASTROINTESTINAL HEMORRHAGE, UNSPECIFIED GASTROINTESTINAL HEMORRHAGE TYPE: ICD-10-CM

## 2024-04-19 DIAGNOSIS — Z96.649 PERIPROSTHETIC FRACTURE OF HIP, SEQUELA: ICD-10-CM

## 2024-04-19 DIAGNOSIS — D64.9 ANEMIA, UNSPECIFIED TYPE: ICD-10-CM

## 2024-04-19 DIAGNOSIS — D50.9 IRON DEFICIENCY ANEMIA, UNSPECIFIED IRON DEFICIENCY ANEMIA TYPE: ICD-10-CM

## 2024-04-19 PROBLEM — Z78.9 IMPAIRED MOBILITY AND ADLS: Status: ACTIVE | Noted: 2024-04-19

## 2024-04-19 PROBLEM — Z74.09 IMPAIRED MOBILITY AND ADLS: Status: ACTIVE | Noted: 2024-04-19

## 2024-04-19 PROBLEM — K59.01 SLOW TRANSIT CONSTIPATION: Status: ACTIVE | Noted: 2024-04-19

## 2024-04-19 PROBLEM — R52 PAIN: Status: ACTIVE | Noted: 2024-04-19

## 2024-04-19 PROBLEM — M97.8XXA PERI-PROSTHETIC FRACTURE AROUND PROSTHETIC HIP: Status: ACTIVE | Noted: 2024-04-19

## 2024-04-19 LAB
ABO GROUP (TYPE) IN BLOOD: NORMAL
ALBUMIN SERPL BCP-MCNC: 3.1 G/DL (ref 3.4–5)
ALP SERPL-CCNC: 44 U/L (ref 33–136)
ALT SERPL W P-5'-P-CCNC: 11 U/L (ref 7–45)
ANION GAP SERPL CALC-SCNC: 13 MMOL/L (ref 10–20)
ANTIBODY SCREEN: NORMAL
APTT PPP: 30 SECONDS (ref 27–38)
AST SERPL W P-5'-P-CCNC: 17 U/L (ref 9–39)
ATRIAL RATE: 80 BPM
BASOPHILS # BLD AUTO: 0.03 X10*3/UL (ref 0–0.1)
BASOPHILS NFR BLD AUTO: 0.3 %
BILIRUB SERPL-MCNC: 0.5 MG/DL (ref 0–1.2)
BLOOD EXPIRATION DATE: NORMAL
BNP SERPL-MCNC: 274 PG/ML (ref 0–99)
BUN SERPL-MCNC: 58 MG/DL (ref 6–23)
CALCIUM SERPL-MCNC: 7.9 MG/DL (ref 8.6–10.3)
CARDIAC TROPONIN I PNL SERPL HS: 28 NG/L (ref 0–13)
CARDIAC TROPONIN I PNL SERPL HS: 30 NG/L (ref 0–13)
CHLORIDE SERPL-SCNC: 102 MMOL/L (ref 98–107)
CO2 SERPL-SCNC: 22 MMOL/L (ref 21–32)
CREAT SERPL-MCNC: 1.5 MG/DL (ref 0.5–1.05)
DISPENSE STATUS: NORMAL
EGFRCR SERPLBLD CKD-EPI 2021: 32 ML/MIN/1.73M*2
EOSINOPHIL # BLD AUTO: 0.08 X10*3/UL (ref 0–0.4)
EOSINOPHIL NFR BLD AUTO: 0.9 %
ERYTHROCYTE [DISTWIDTH] IN BLOOD BY AUTOMATED COUNT: 14 % (ref 11.5–14.5)
ERYTHROCYTE [DISTWIDTH] IN BLOOD BY AUTOMATED COUNT: 14.2 % (ref 11.5–14.5)
FERRITIN SERPL-MCNC: 37 NG/ML (ref 8–150)
FLUAV RNA RESP QL NAA+PROBE: NOT DETECTED
FLUBV RNA RESP QL NAA+PROBE: NOT DETECTED
FOLATE SERPL-MCNC: 9.4 NG/ML
GLUCOSE SERPL-MCNC: 132 MG/DL (ref 74–99)
HCT VFR BLD AUTO: 22.1 % (ref 36–46)
HCT VFR BLD AUTO: 24 % (ref 36–46)
HGB BLD-MCNC: 6.8 G/DL (ref 12–16)
HGB BLD-MCNC: 7.9 G/DL (ref 12–16)
HGB RETIC QN: 26 PG (ref 28–38)
IMM GRANULOCYTES # BLD AUTO: 0.04 X10*3/UL (ref 0–0.5)
IMM GRANULOCYTES NFR BLD AUTO: 0.4 % (ref 0–0.9)
IMMATURE RETIC FRACTION: 21.5 %
INR PPP: 1.1 (ref 0.9–1.1)
IRON SATN MFR SERPL: 6 % (ref 25–45)
IRON SERPL-MCNC: 20 UG/DL (ref 35–150)
LACTATE SERPL-SCNC: 1.9 MMOL/L (ref 0.4–2)
LACTATE SERPL-SCNC: 2.6 MMOL/L (ref 0.4–2)
LYMPHOCYTES # BLD AUTO: 1.07 X10*3/UL (ref 0.8–3)
LYMPHOCYTES NFR BLD AUTO: 11.8 %
MAGNESIUM SERPL-MCNC: 1.98 MG/DL (ref 1.6–2.4)
MCH RBC QN AUTO: 26.9 PG (ref 26–34)
MCH RBC QN AUTO: 28.3 PG (ref 26–34)
MCHC RBC AUTO-ENTMCNC: 30.8 G/DL (ref 32–36)
MCHC RBC AUTO-ENTMCNC: 32.9 G/DL (ref 32–36)
MCV RBC AUTO: 86 FL (ref 80–100)
MCV RBC AUTO: 87 FL (ref 80–100)
MONOCYTES # BLD AUTO: 0.45 X10*3/UL (ref 0.05–0.8)
MONOCYTES NFR BLD AUTO: 5 %
NEUTROPHILS # BLD AUTO: 7.39 X10*3/UL (ref 1.6–5.5)
NEUTROPHILS NFR BLD AUTO: 81.6 %
NRBC BLD-RTO: 0 /100 WBCS (ref 0–0)
NRBC BLD-RTO: 0 /100 WBCS (ref 0–0)
P AXIS: -43 DEGREES
P OFFSET: 215 MS
P ONSET: 157 MS
PLATELET # BLD AUTO: 176 X10*3/UL (ref 150–450)
PLATELET # BLD AUTO: 184 X10*3/UL (ref 150–450)
POTASSIUM SERPL-SCNC: 4.4 MMOL/L (ref 3.5–5.3)
PR INTERVAL: 132 MS
PRODUCT BLOOD TYPE: 600
PRODUCT CODE: NORMAL
PROT SERPL-MCNC: 5.1 G/DL (ref 6.4–8.2)
PROTHROMBIN TIME: 12.4 SECONDS (ref 9.8–12.8)
Q ONSET: 223 MS
QRS COUNT: 14 BEATS
QRS DURATION: 82 MS
QT INTERVAL: 358 MS
QTC CALCULATION(BAZETT): 412 MS
QTC FREDERICIA: 394 MS
R AXIS: -17 DEGREES
RBC # BLD AUTO: 2.53 X10*6/UL (ref 4–5.2)
RBC # BLD AUTO: 2.79 X10*6/UL (ref 4–5.2)
RETICS #: 0.06 X10*6/UL (ref 0.02–0.11)
RETICS/RBC NFR AUTO: 2.2 % (ref 0.5–2)
RH FACTOR (ANTIGEN D): NORMAL
SARS-COV-2 RNA RESP QL NAA+PROBE: NOT DETECTED
SODIUM SERPL-SCNC: 133 MMOL/L (ref 136–145)
T AXIS: 25 DEGREES
T OFFSET: 402 MS
TIBC SERPL-MCNC: 334 UG/DL (ref 240–445)
UIBC SERPL-MCNC: 314 UG/DL (ref 110–370)
UNIT ABO: NORMAL
UNIT NUMBER: NORMAL
UNIT RH: NORMAL
UNIT VOLUME: 278
VENTRICULAR RATE: 80 BPM
VIT B12 SERPL-MCNC: 187 PG/ML (ref 211–911)
WBC # BLD AUTO: 10.7 X10*3/UL (ref 4.4–11.3)
WBC # BLD AUTO: 9.1 X10*3/UL (ref 4.4–11.3)
XM INTEP: NORMAL

## 2024-04-19 PROCEDURE — 96360 HYDRATION IV INFUSION INIT: CPT

## 2024-04-19 PROCEDURE — P9016 RBC LEUKOCYTES REDUCED: HCPCS

## 2024-04-19 PROCEDURE — 83605 ASSAY OF LACTIC ACID: CPT | Performed by: EMERGENCY MEDICINE

## 2024-04-19 PROCEDURE — 85610 PROTHROMBIN TIME: CPT | Performed by: EMERGENCY MEDICINE

## 2024-04-19 PROCEDURE — 83880 ASSAY OF NATRIURETIC PEPTIDE: CPT | Performed by: EMERGENCY MEDICINE

## 2024-04-19 PROCEDURE — 2500000004 HC RX 250 GENERAL PHARMACY W/ HCPCS (ALT 636 FOR OP/ED): Performed by: EMERGENCY MEDICINE

## 2024-04-19 PROCEDURE — 99222 1ST HOSP IP/OBS MODERATE 55: CPT | Performed by: NURSE PRACTITIONER

## 2024-04-19 PROCEDURE — 84484 ASSAY OF TROPONIN QUANT: CPT | Performed by: EMERGENCY MEDICINE

## 2024-04-19 PROCEDURE — 86920 COMPATIBILITY TEST SPIN: CPT

## 2024-04-19 PROCEDURE — 99291 CRITICAL CARE FIRST HOUR: CPT | Performed by: EMERGENCY MEDICINE

## 2024-04-19 PROCEDURE — 83540 ASSAY OF IRON: CPT | Performed by: INTERNAL MEDICINE

## 2024-04-19 PROCEDURE — 85730 THROMBOPLASTIN TIME PARTIAL: CPT | Performed by: EMERGENCY MEDICINE

## 2024-04-19 PROCEDURE — 1210000001 HC SEMI-PRIVATE ROOM DAILY

## 2024-04-19 PROCEDURE — 71045 X-RAY EXAM CHEST 1 VIEW: CPT | Mod: FOREIGN READ | Performed by: RADIOLOGY

## 2024-04-19 PROCEDURE — 82607 VITAMIN B-12: CPT | Performed by: INTERNAL MEDICINE

## 2024-04-19 PROCEDURE — 80053 COMPREHEN METABOLIC PANEL: CPT | Performed by: EMERGENCY MEDICINE

## 2024-04-19 PROCEDURE — 36415 COLL VENOUS BLD VENIPUNCTURE: CPT | Performed by: EMERGENCY MEDICINE

## 2024-04-19 PROCEDURE — 82728 ASSAY OF FERRITIN: CPT | Performed by: INTERNAL MEDICINE

## 2024-04-19 PROCEDURE — 71045 X-RAY EXAM CHEST 1 VIEW: CPT

## 2024-04-19 PROCEDURE — 85045 AUTOMATED RETICULOCYTE COUNT: CPT | Performed by: INTERNAL MEDICINE

## 2024-04-19 PROCEDURE — 87636 SARSCOV2 & INF A&B AMP PRB: CPT | Performed by: EMERGENCY MEDICINE

## 2024-04-19 PROCEDURE — 36430 TRANSFUSION BLD/BLD COMPNT: CPT

## 2024-04-19 PROCEDURE — 85027 COMPLETE CBC AUTOMATED: CPT | Performed by: INTERNAL MEDICINE

## 2024-04-19 PROCEDURE — 82746 ASSAY OF FOLIC ACID SERUM: CPT | Performed by: INTERNAL MEDICINE

## 2024-04-19 PROCEDURE — 2500000001 HC RX 250 WO HCPCS SELF ADMINISTERED DRUGS (ALT 637 FOR MEDICARE OP): Performed by: INTERNAL MEDICINE

## 2024-04-19 PROCEDURE — 86900 BLOOD TYPING SEROLOGIC ABO: CPT | Mod: 91 | Performed by: EMERGENCY MEDICINE

## 2024-04-19 PROCEDURE — 93005 ELECTROCARDIOGRAM TRACING: CPT

## 2024-04-19 PROCEDURE — 85025 COMPLETE CBC W/AUTO DIFF WBC: CPT | Performed by: EMERGENCY MEDICINE

## 2024-04-19 PROCEDURE — 83735 ASSAY OF MAGNESIUM: CPT | Performed by: EMERGENCY MEDICINE

## 2024-04-19 PROCEDURE — 99285 EMERGENCY DEPT VISIT HI MDM: CPT | Mod: 25

## 2024-04-19 PROCEDURE — 2500000004 HC RX 250 GENERAL PHARMACY W/ HCPCS (ALT 636 FOR OP/ED): Performed by: INTERNAL MEDICINE

## 2024-04-19 PROCEDURE — 99223 1ST HOSP IP/OBS HIGH 75: CPT | Performed by: INTERNAL MEDICINE

## 2024-04-19 RX ORDER — ASCORBIC ACID 500 MG
500 TABLET ORAL DAILY
COMMUNITY

## 2024-04-19 RX ORDER — ACETAMINOPHEN 325 MG/1
650 TABLET ORAL EVERY 4 HOURS PRN
Status: DISCONTINUED | OUTPATIENT
Start: 2024-04-19 | End: 2024-04-24 | Stop reason: HOSPADM

## 2024-04-19 RX ORDER — TIOTROPIUM BROMIDE 18 UG/1
1 CAPSULE ORAL; RESPIRATORY (INHALATION) DAILY
COMMUNITY

## 2024-04-19 RX ORDER — CHOLECALCIFEROL (VITAMIN D3) 25 MCG
1000 TABLET ORAL DAILY
COMMUNITY

## 2024-04-19 RX ORDER — FLUTICASONE PROPIONATE AND SALMETEROL 100; 50 UG/1; UG/1
1 POWDER RESPIRATORY (INHALATION) 2 TIMES DAILY
COMMUNITY

## 2024-04-19 RX ORDER — ONDANSETRON 4 MG/1
4 TABLET, FILM COATED ORAL EVERY 8 HOURS PRN
Status: DISCONTINUED | OUTPATIENT
Start: 2024-04-19 | End: 2024-04-24 | Stop reason: HOSPADM

## 2024-04-19 RX ORDER — FLUTICASONE FUROATE AND VILANTEROL 100; 25 UG/1; UG/1
1 POWDER RESPIRATORY (INHALATION)
Status: DISCONTINUED | OUTPATIENT
Start: 2024-04-19 | End: 2024-04-24 | Stop reason: HOSPADM

## 2024-04-19 RX ORDER — ATORVASTATIN CALCIUM 20 MG/1
20 TABLET, FILM COATED ORAL NIGHTLY
Status: DISCONTINUED | OUTPATIENT
Start: 2024-04-19 | End: 2024-04-24 | Stop reason: HOSPADM

## 2024-04-19 RX ORDER — UBIDECARENONE 75 MG
1000 CAPSULE ORAL DAILY
Status: DISCONTINUED | OUTPATIENT
Start: 2024-04-19 | End: 2024-04-24 | Stop reason: HOSPADM

## 2024-04-19 RX ORDER — PANTOPRAZOLE SODIUM 40 MG/1
40 TABLET, DELAYED RELEASE ORAL
Status: DISCONTINUED | OUTPATIENT
Start: 2024-04-20 | End: 2024-04-24 | Stop reason: HOSPADM

## 2024-04-19 RX ORDER — HYDROCODONE BITARTRATE AND ACETAMINOPHEN 5; 325 MG/1; MG/1
1 TABLET ORAL EVERY 6 HOURS PRN
Status: DISCONTINUED | OUTPATIENT
Start: 2024-04-19 | End: 2024-04-24 | Stop reason: HOSPADM

## 2024-04-19 RX ORDER — SODIUM CHLORIDE, SODIUM LACTATE, POTASSIUM CHLORIDE, CALCIUM CHLORIDE 600; 310; 30; 20 MG/100ML; MG/100ML; MG/100ML; MG/100ML
75 INJECTION, SOLUTION INTRAVENOUS CONTINUOUS
Status: DISCONTINUED | OUTPATIENT
Start: 2024-04-19 | End: 2024-04-20

## 2024-04-19 RX ORDER — ONDANSETRON HYDROCHLORIDE 2 MG/ML
4 INJECTION, SOLUTION INTRAVENOUS EVERY 8 HOURS PRN
Status: DISCONTINUED | OUTPATIENT
Start: 2024-04-19 | End: 2024-04-24 | Stop reason: HOSPADM

## 2024-04-19 RX ORDER — ONDANSETRON 4 MG/1
4 TABLET, FILM COATED ORAL EVERY 8 HOURS PRN
COMMUNITY

## 2024-04-19 RX ADMIN — CYANOCOBALAMIN TAB 500 MCG 1000 MCG: 500 TAB at 17:19

## 2024-04-19 RX ADMIN — ATORVASTATIN CALCIUM 20 MG: 20 TABLET, FILM COATED ORAL at 20:52

## 2024-04-19 RX ADMIN — IRON SUCROSE 200 MG: 20 INJECTION, SOLUTION INTRAVENOUS at 17:18

## 2024-04-19 RX ADMIN — SODIUM CHLORIDE, POTASSIUM CHLORIDE, SODIUM LACTATE AND CALCIUM CHLORIDE 75 ML/HR: 600; 310; 30; 20 INJECTION, SOLUTION INTRAVENOUS at 17:19

## 2024-04-19 RX ADMIN — SODIUM CHLORIDE 500 ML: 9 INJECTION, SOLUTION INTRAVENOUS at 10:06

## 2024-04-19 SDOH — SOCIAL STABILITY: SOCIAL INSECURITY: DO YOU FEEL UNSAFE GOING BACK TO THE PLACE WHERE YOU ARE LIVING?: NO

## 2024-04-19 SDOH — SOCIAL STABILITY: SOCIAL INSECURITY: HAVE YOU HAD ANY THOUGHTS OF HARMING ANYONE ELSE?: NO

## 2024-04-19 SDOH — SOCIAL STABILITY: SOCIAL INSECURITY: ABUSE: ADULT

## 2024-04-19 SDOH — SOCIAL STABILITY: SOCIAL INSECURITY: DOES ANYONE TRY TO KEEP YOU FROM HAVING/CONTACTING OTHER FRIENDS OR DOING THINGS OUTSIDE YOUR HOME?: NO

## 2024-04-19 SDOH — SOCIAL STABILITY: SOCIAL INSECURITY: HAS ANYONE EVER THREATENED TO HURT YOUR FAMILY OR YOUR PETS?: NO

## 2024-04-19 SDOH — SOCIAL STABILITY: SOCIAL INSECURITY: WERE YOU ABLE TO COMPLETE ALL THE BEHAVIORAL HEALTH SCREENINGS?: YES

## 2024-04-19 SDOH — SOCIAL STABILITY: SOCIAL INSECURITY: DO YOU FEEL ANYONE HAS EXPLOITED OR TAKEN ADVANTAGE OF YOU FINANCIALLY OR OF YOUR PERSONAL PROPERTY?: NO

## 2024-04-19 SDOH — SOCIAL STABILITY: SOCIAL INSECURITY: HAVE YOU HAD THOUGHTS OF HARMING ANYONE ELSE?: NO

## 2024-04-19 SDOH — SOCIAL STABILITY: SOCIAL INSECURITY: ARE YOU OR HAVE YOU BEEN THREATENED OR ABUSED PHYSICALLY, EMOTIONALLY, OR SEXUALLY BY ANYONE?: YES

## 2024-04-19 SDOH — SOCIAL STABILITY: SOCIAL INSECURITY: ARE THERE ANY APPARENT SIGNS OF INJURIES/BEHAVIORS THAT COULD BE RELATED TO ABUSE/NEGLECT?: NO

## 2024-04-19 ASSESSMENT — ACTIVITIES OF DAILY LIVING (ADL)
GROOMING: NEEDS ASSISTANCE
FEEDING YOURSELF: INDEPENDENT
DRESSING YOURSELF: NEEDS ASSISTANCE
HEARING - RIGHT EAR: FUNCTIONAL
BATHING: NEEDS ASSISTANCE
PATIENT'S MEMORY ADEQUATE TO SAFELY COMPLETE DAILY ACTIVITIES?: YES
PATIENT'S MEMORY ADEQUATE TO SAFELY COMPLETE DAILY ACTIVITIES?: YES
TOILETING: NEEDS ASSISTANCE
BATHING: NEEDS ASSISTANCE
FEEDING YOURSELF: INDEPENDENT
ADEQUATE_TO_COMPLETE_ADL: YES
GROOMING: NEEDS ASSISTANCE
HEARING - RIGHT EAR: FUNCTIONAL
ASSISTIVE_DEVICE: EYEGLASSES;WALKER
DRESSING YOURSELF: NEEDS ASSISTANCE
LACK_OF_TRANSPORTATION: NO
JUDGMENT_ADEQUATE_SAFELY_COMPLETE_DAILY_ACTIVITIES: YES
HEARING - LEFT EAR: FUNCTIONAL
JUDGMENT_ADEQUATE_SAFELY_COMPLETE_DAILY_ACTIVITIES: YES
WALKS IN HOME: NEEDS ASSISTANCE
TOILETING: NEEDS ASSISTANCE
ADEQUATE_TO_COMPLETE_ADL: YES
HEARING - LEFT EAR: FUNCTIONAL

## 2024-04-19 ASSESSMENT — LIFESTYLE VARIABLES
EVER FELT BAD OR GUILTY ABOUT YOUR DRINKING: NO
HOW OFTEN DO YOU HAVE 6 OR MORE DRINKS ON ONE OCCASION: NEVER
AUDIT-C TOTAL SCORE: 2
HAVE PEOPLE ANNOYED YOU BY CRITICIZING YOUR DRINKING: NO
HAVE YOU EVER FELT YOU SHOULD CUT DOWN ON YOUR DRINKING: NO
AUDIT-C TOTAL SCORE: 2
SKIP TO QUESTIONS 9-10: 1
TOTAL SCORE: 0
HOW MANY STANDARD DRINKS CONTAINING ALCOHOL DO YOU HAVE ON A TYPICAL DAY: 1 OR 2
HOW OFTEN DO YOU HAVE A DRINK CONTAINING ALCOHOL: 2-4 TIMES A MONTH
EVER HAD A DRINK FIRST THING IN THE MORNING TO STEADY YOUR NERVES TO GET RID OF A HANGOVER: NO

## 2024-04-19 ASSESSMENT — COLUMBIA-SUICIDE SEVERITY RATING SCALE - C-SSRS
1. IN THE PAST MONTH, HAVE YOU WISHED YOU WERE DEAD OR WISHED YOU COULD GO TO SLEEP AND NOT WAKE UP?: NO
2. HAVE YOU ACTUALLY HAD ANY THOUGHTS OF KILLING YOURSELF?: NO
6. HAVE YOU EVER DONE ANYTHING, STARTED TO DO ANYTHING, OR PREPARED TO DO ANYTHING TO END YOUR LIFE?: NO

## 2024-04-19 ASSESSMENT — PAIN SCALES - GENERAL
PAINLEVEL_OUTOF10: 0 - NO PAIN

## 2024-04-19 ASSESSMENT — PATIENT HEALTH QUESTIONNAIRE - PHQ9
SUM OF ALL RESPONSES TO PHQ9 QUESTIONS 1 & 2: 0
2. FEELING DOWN, DEPRESSED OR HOPELESS: NOT AT ALL
1. LITTLE INTEREST OR PLEASURE IN DOING THINGS: NOT AT ALL

## 2024-04-19 ASSESSMENT — COGNITIVE AND FUNCTIONAL STATUS - GENERAL
CLIMB 3 TO 5 STEPS WITH RAILING: A LITTLE
DRESSING REGULAR LOWER BODY CLOTHING: A LITTLE
STANDING UP FROM CHAIR USING ARMS: A LITTLE
MOVING TO AND FROM BED TO CHAIR: A LITTLE
PERSONAL GROOMING: A LITTLE
HELP NEEDED FOR BATHING: A LITTLE
DAILY ACTIVITIY SCORE: 19
DRESSING REGULAR UPPER BODY CLOTHING: A LITTLE
WALKING IN HOSPITAL ROOM: A LITTLE
MOBILITY SCORE: 20
TOILETING: A LITTLE
PATIENT BASELINE BEDBOUND: NO

## 2024-04-19 ASSESSMENT — PAIN - FUNCTIONAL ASSESSMENT: PAIN_FUNCTIONAL_ASSESSMENT: 0-10

## 2024-04-19 NOTE — H&P
Medical Group History and Physical    ASSESSMENT & PLAN:     Painless LGIB  Acute on chronic anemia due to blood loss  -had hematochezia at rehab center, Hgb was 6.3 (was 7-10 in April, 10-11.5 in Jan)  -was new start xarelto for vte ppx after recent periprosthetic hip fx, and was taking asa 81  Plan:  -gi consult, although she is relatively high risk for sedation with her age and comorbidities, and daughter was reluctant  -gentle ivf  -hold antiplatelet, ac agents  -cld for now  -add on iron labs  -getting 1u prbc from ed, recheck cbc in pm    FRANCES  -Scr 1.50 on admission, bl normal, likely prerenal in s/o bleeding  -gentle ivf and getting blood as above, trend scr, avoid nephrotoxins, hold home arb for now    HTN - hold home meds for now  CAD s/p remote PCI, DLD - hold ASA 81, cont statin  Chronic HFpEF - appears euvolemic, getting gentle ivf as above, holding home bp meds for now  COPD - not in acute exacerbation, resume home inhalers  R periprosthetic hip fx - pt/ot, presumably dispo is back to ar once bleeding resolved, keep outpt fu with ortho, pain control prn  Vte ppx scds  Dnr/dni, okay with icu, discussed with pt    Samir Gama MD    HISTORY OF PRESENT ILLNESS:   Chief Complaint: hematochezia    History Of Present Illness:    89F with PMH of HTN, DLD, CAD s/p remote PCI, aortic stenosis s/p TAVR, chronic HFpEF, hypothyroidism, COPD, R periprosthetic hip fracture who is presenting with BRBPR. She was recently admitted at [facility] [place] for R hip pain after a fall, was found to have a nondisplaced periprosthetic R hip fracture that was recommended to be managed nonsurgically. She was discharged to acute rehab with 4 weeks of Xarelto for VTE ppx. At the rehab she went to the bathroom last night and noticed the toilet bowl was full of bright red blood. Happened again this morning. She was also dyspneic. Hgb was 6.3, she was sent to ED. Repeat here was 6.8. Currently HDS. Getting blood transfusion in ED. No abd pain,  hematemesis, melena. Denies any hx of GIB in the past.     Review of systems: 10 point review of systems is otherwise negative except as mentioned above.    PAST HISTORIES:     Past Medical History:  She has a past medical history of Hypothyroidism, unspecified (01/15/2016), Personal history of diseases of the blood and blood-forming organs and certain disorders involving the immune mechanism (11/13/2015), Personal history of other diseases of the circulatory system (01/15/2016), Personal history of other diseases of the circulatory system (01/15/2016), Personal history of other diseases of the musculoskeletal system and connective tissue (11/13/2015), and Pulmonary hypertension, unspecified (Multi) (01/15/2016).    Past Surgical History:  She has a past surgical history that includes Coronary angioplasty (01/15/2016); Hysterectomy (01/15/2016); Aortic valve replacement (01/15/2016); Esophagogastroduodenoscopy (01/15/2016); Knee surgery (01/15/2016); Other surgical history (05/19/2022); Other surgical history (05/19/2022); Other surgical history (05/19/2022); Other surgical history (05/19/2022); Other surgical history (05/19/2022); CT angio abdomen pelvis w and or wo IV IV contrast (11/6/2015); MR angio head wo IV contrast (1/22/2023); and MR angio neck wo IV contrast (1/22/2023).      Social History:  She reports that she has quit smoking. Her smoking use included cigarettes. She has never used smokeless tobacco. No history on file for alcohol use and drug use.    Family History:  Family History   Problem Relation Name Age of Onset    Hypertension Mother      Heart disease Father          RHEUMATIC    Hypertension Father      Coronary artery disease Father      Other (ENDOCARDITIS) Father          Allergies:  Oxycodone and Sulfa (sulfonamide antibiotics)    OBJECTIVE:     Last Recorded Vitals:  Vitals:    04/19/24 1150 04/19/24 1200 04/19/24 1215 04/19/24 1230   BP: (!) 136/45 (!) 127/46 133/62 145/54   BP  Location: Right arm Right arm Right arm Right arm   Patient Position: Lying Lying Lying Lying   Pulse: 82 81 84 86   Resp: 18 16 16 16   Temp: 36.7 °C (98.1 °F) 36.9 °C (98.4 °F) 37 °C (98.6 °F)    TempSrc: Temporal Temporal Temporal    SpO2: 98% 98% 97% 98%   Weight:       Height:           Last I/O:  No intake/output data recorded.    Physical Exam:  GEN: appears stated age, NAD  CV:  no LE edema  LUNGS: CTAB, no w/r/c, no resp distress, on ra  ABD: soft, NT  SKIN: scattered bruising  MSK; no gross deformities, normal joints  NEURO: A+Ox3, no FND  PSYCH: appropriate mood, affect    Scheduled Medications      PRN Medications      Continuous Medications      Outpatient Medications:  Prior to Admission medications    Medication Sig Start Date End Date Taking? Authorizing Provider   ascorbic acid (Vitamin C) 500 mg tablet Take 1 tablet (500 mg) by mouth once daily.    Historical Provider, MD   aspirin 81 mg chewable tablet Chew 1 tablet (81 mg) once daily.    Historical Provider, MD   atorvastatin (Lipitor) 20 mg tablet Take 1 tablet (20 mg) by mouth once daily at bedtime. 10/1/15   Historical Provider, MD   bisacodyl (Dulcolax) 5 mg EC tablet Take 1 tablet (5 mg) by mouth if needed. 3/10/22   Historical Provider, MD   cholecalciferol (Vitamin D-3) 25 MCG (1000 UT) tablet Take 1 tablet (1,000 Units) by mouth once daily.    Historical Provider, MD   fluticasone propion-salmeteroL (Advair Diskus) 100-50 mcg/dose diskus inhaler Inhale 1 puff 2 times a day. Rinse mouth with water after use to reduce aftertaste and incidence of candidiasis. Do not swallow.    Historical Provider, MD   HYDROcodone-acetaminophen (Norco) 5-325 mg tablet Take 1 tablet by mouth every 6 hours if needed for moderate pain (4 - 6) or severe pain (7 - 10) for up to 7 days. 4/17/24 4/24/24  SARIKA Blackwell-CNP   losartan (Cozaar) 50 mg tablet Take 1 tablet (50 mg) by mouth once daily at bedtime. 9/13/22   Historical Provider, MD   meclizine  (Antivert) 12.5 mg tablet Take 1 tablet (12.5 mg) by mouth 3 times a day as needed for dizziness.    Historical Provider, MD   nitroglycerin (Nitrostat) 0.4 mg SL tablet Place 1 tablet (0.4 mg) under the tongue every 5 minutes if needed for chest pain (FOR UP TO 3 DOSES FOR CHEST PAIN. CALL 911 IF PAIN PERSISTS). 10/1/15   Historical Provider, MD   ondansetron (Zofran) 4 mg tablet Take 1 tablet (4 mg) by mouth every 8 hours if needed for nausea or vomiting.    Historical Provider, MD   polyethylene glycol (Miralax) 17 gram/dose powder Take 17 g by mouth once daily as needed.    Historical Provider, MD   rivaroxaban (Xarelto) 10 mg tablet Take 1 tablet (10 mg) by mouth once daily for 27 days. 4/18/24 5/15/24  WAQAS Blackwell   tiotropium (Spiriva) 18 mcg inhalation capsule Place 1 capsule (18 mcg) into inhaler and inhale once daily.    Historical Provider, MD   albuterol 90 mcg/actuation inhaler Inhale. 11/14/22 4/16/24  Historical Provider, MD   ascorbic acid, vitamin C, 500 mg capsule Take 1 tablet by mouth once daily. USE AS DIRECTED  4/19/24  Historical Provider, MD   diclofenac sodium 1 % kit Place on the skin. 11/17/15 4/16/24  Historical Provider, MD   ergocalciferol, vitamin D2, (VITAMIN D2 ORAL) Take 1 tablet by mouth once daily.  4/19/24  Historical Provider, MD   fluticasone furoate-vilanteroL (Breo Ellipta) 100-25 mcg/dose inhaler Inhale 1 puff once daily.  4/16/24  Historical Provider, MD   fluticasone-umeclidin-vilanter (TRELEGY-ELLIPTA) 100-62.5-25 mcg blister with device Inhale 1 puff once daily.  4/19/24  Historical Provider, MD   HYDROcodone-acetaminophen (Norco) 5-325 mg tablet Take 1 tablet by mouth every 6 hours if needed for moderate pain (4 - 6) or severe pain (7 - 10) for up to 7 days. 4/17/24 4/17/24  WAQAS Blackwell   hydroxychloroquine (Plaquenil) 200 mg tablet Take 1 tablet (200 mg) by mouth once daily. 10/1/15 4/16/24  Historical Provider, MD   levothyroxine (Synthroid,  Levoxyl) 75 mcg tablet Take 1 tablet (75 mcg) by mouth once daily. As directed  4/16/24  Historical Provider, MD   methocarbamol (Robaxin) 500 mg tablet Take by mouth. 3/10/22 4/16/24  Historical Provider, MD   MULTIVITAMIN ORAL Take 1 capsule by mouth once daily.  4/16/24  Historical Provider, MD   primidone (Mysoline) 50 mg tablet Take by mouth. 10/1/15 4/16/24  Historical Provider, MD       LABS AND IMAGING:     Labs:  Results for orders placed or performed during the hospital encounter of 04/19/24 (from the past 24 hour(s))   CBC and Auto Differential   Result Value Ref Range    WBC 9.1 4.4 - 11.3 x10*3/uL    nRBC 0.0 0.0 - 0.0 /100 WBCs    RBC 2.53 (L) 4.00 - 5.20 x10*6/uL    Hemoglobin 6.8 (L) 12.0 - 16.0 g/dL    Hematocrit 22.1 (L) 36.0 - 46.0 %    MCV 87 80 - 100 fL    MCH 26.9 26.0 - 34.0 pg    MCHC 30.8 (L) 32.0 - 36.0 g/dL    RDW 14.0 11.5 - 14.5 %    Platelets 184 150 - 450 x10*3/uL    Neutrophils % 81.6 40.0 - 80.0 %    Immature Granulocytes %, Automated 0.4 0.0 - 0.9 %    Lymphocytes % 11.8 13.0 - 44.0 %    Monocytes % 5.0 2.0 - 10.0 %    Eosinophils % 0.9 0.0 - 6.0 %    Basophils % 0.3 0.0 - 2.0 %    Neutrophils Absolute 7.39 (H) 1.60 - 5.50 x10*3/uL    Immature Granulocytes Absolute, Automated 0.04 0.00 - 0.50 x10*3/uL    Lymphocytes Absolute 1.07 0.80 - 3.00 x10*3/uL    Monocytes Absolute 0.45 0.05 - 0.80 x10*3/uL    Eosinophils Absolute 0.08 0.00 - 0.40 x10*3/uL    Basophils Absolute 0.03 0.00 - 0.10 x10*3/uL   Comprehensive Metabolic Panel   Result Value Ref Range    Glucose 132 (H) 74 - 99 mg/dL    Sodium 133 (L) 136 - 145 mmol/L    Potassium 4.4 3.5 - 5.3 mmol/L    Chloride 102 98 - 107 mmol/L    Bicarbonate 22 21 - 32 mmol/L    Anion Gap 13 10 - 20 mmol/L    Urea Nitrogen 58 (H) 6 - 23 mg/dL    Creatinine 1.50 (H) 0.50 - 1.05 mg/dL    eGFR 32 (L) >60 mL/min/1.73m*2    Calcium 7.9 (L) 8.6 - 10.3 mg/dL    Albumin 3.1 (L) 3.4 - 5.0 g/dL    Alkaline Phosphatase 44 33 - 136 U/L    Total Protein 5.1  (L) 6.4 - 8.2 g/dL    AST 17 9 - 39 U/L    Bilirubin, Total 0.5 0.0 - 1.2 mg/dL    ALT 11 7 - 45 U/L   Magnesium   Result Value Ref Range    Magnesium 1.98 1.60 - 2.40 mg/dL   Troponin I, High Sensitivity   Result Value Ref Range    Troponin I, High Sensitivity 28 (H) 0 - 13 ng/L   Lactate   Result Value Ref Range    Lactate 2.6 (H) 0.4 - 2.0 mmol/L   B-Type Natriuretic Peptide   Result Value Ref Range     (H) 0 - 99 pg/mL   Type and Screen   Result Value Ref Range    ABO TYPE A     Rh TYPE NEG     ANTIBODY SCREEN NEG    Protime-INR   Result Value Ref Range    Protime 12.4 9.8 - 12.8 seconds    INR 1.1 0.9 - 1.1   aPTT   Result Value Ref Range    aPTT 30 27 - 38 seconds   Iron and TIBC   Result Value Ref Range    Iron 20 (L) 35 - 150 ug/dL    UIBC 314 110 - 370 ug/dL    TIBC 334 240 - 445 ug/dL    % Saturation 6 (L) 25 - 45 %   Ferritin   Result Value Ref Range    Ferritin 37 8 - 150 ng/mL   Reticulocytes   Result Value Ref Range    Retic % 2.2 (H) 0.5 - 2.0 %    Retic Absolute 0.056 0.017 - 0.110 x10*6/uL    Reticulocyte Hemoglobin 26 (L) 28 - 38 pg    Immature Retic fraction 21.5 (H) <=16.0 %   Sars-CoV-2 and Influenza A/B PCR   Result Value Ref Range    Flu A Result Not Detected Not Detected    Flu B Result Not Detected Not Detected    Coronavirus 2019, PCR Not Detected Not Detected   ECG 12 lead   Result Value Ref Range    Ventricular Rate 80 BPM    Atrial Rate 80 BPM    NV Interval 132 ms    QRS Duration 82 ms    QT Interval 358 ms    QTC Calculation(Bazett) 412 ms    P Axis -43 degrees    R Axis -17 degrees    T Axis 25 degrees    QRS Count 14 beats    Q Onset 223 ms    P Onset 157 ms    P Offset 215 ms    T Offset 402 ms    QTC Fredericia 394 ms   Prepare RBC: 1 Units   Result Value Ref Range    PRODUCT CODE W4687I81     Unit Number [id number]     Unit ABO A     Unit RH NEG     XM INTEP COMP     Dispense Status TR     Blood Expiration Date May 02, 2024 23:59 EDT     PRODUCT BLOOD TYPE 0600      UNIT VOLUME 278    Troponin I, High Sensitivity   Result Value Ref Range    Troponin I, High Sensitivity 30 (H) 0 - 13 ng/L   Lactate   Result Value Ref Range    Lactate 1.9 0.4 - 2.0 mmol/L        Imaging:  XR chest 1 view  Narrative: STUDY:  Chest Radiograph; 04/19/2024 10:44 AM  INDICATION:  Shortness of breath.  COMPARISON:  XR chest 11/14/2022  ACCESSION NUMBER(S):  [id number]  ORDERING CLINICIAN:  JOSEPH LAMAS  TECHNIQUE:  Frontal chest was obtained at 10:44:00 hours.  FINDINGS:  No acute opacities or effusions are visualized.  Heart size is  enlarged.  There is no evidence of pneumothorax.  An aortic valve  prosthesis is present.  Impression: 1.  Cardiomegaly.  Signed by Evaristo Yarbrough MD  ECG 12 lead  Unusual P axis, possible ectopic atrial rhythm  Minimal voltage criteria for LVH, may be normal variant  Abnormal ECG  When compared with ECG of 19-APR-2024 09:46, (unconfirmed)  Ectopic atrial rhythm has replaced Sinus rhythm  See ED provider note for full interpretation and clinical correlation

## 2024-04-19 NOTE — Clinical Note
Huddle and Timeout completed together with team. Patient wristband and DAISY information verified.   Verified with [facility] gas hose placements

## 2024-04-19 NOTE — ED PROVIDER NOTES
89-year-old female presents emergency department with chief complaint of bright red blood per rectum.  Patient was sent in by rehab facility as she was also found to have low hemoglobin.  Patient states she had an episode of bright red blood per rectum last night with a bowel movement and then again today.  She denies fevers, coughing, or congestion.  She denies any lightheadedness.  She does report some mild shortness of breath.  No abdominal pain, nausea, vomiting, dysuria, diarrhea, or black stools.  Patient does admit to constipation.  She has significant history of recent fall with right hip injury and is in rehab facility for this. Chart review also shows patient is on Xarelto.  Chart review shows significant past medical history of aortic valve stenosis, asthma, hypertension, CAD, CHF, osteoarthritis, hyperlipidemia, previous TAVR, previous tobacco use, and difficulty ambulating at baseline.  Patient is a previous smoker no illicit drug use or regular alcohol use. Patient has recent hospitalization from 4/16/2024 until 4/17/2024.  Patient was admitted for a fall with right hip pain.  She did not require any surgical intervention for the right hip.          History provided by:  Patient and EMS personnel   used: No           ------------------------------------------------------------------------------------------------------------------------------------------    VS: As documented in the triage note and EMR flowsheet from this visit were reviewed.    Review of Systems  Constitutional: no fever, chills  Eyes: no redness, discharge, pain  HENT: no sore throat, nose bleeds, congestion, rhinorrhea   Cardiovascular: no chest pain, leg edema, palpitations  Respiratory: Admits to mild shortness of breath no cough, wheezing  GI: no nausea, diarrhea, pain, vomiting, constipation, reports bright red blood per rectum  : no dysuria, frequency, hematuria  Musculoskeletal: no neck pain, stiffness,  no  joint deformity, swelling  Skin: no rash, erythema, wounds  Neurological: no headache, dizziness, lightheadedness, weakness, numbness, or tingling  Psychiatric: no suicidal thoughts, confusion, agitation  Metabolic: no polyuria or polydipsia  Hematologic: Patient is on Xarelto  Immunology: No immunocompromise state    [facility]  Nursing notes reviewed and confirmed by me.  Chart review performed including medications, allergies, and medical, surgical, and family history  Visit Vitals  /62 (BP Location: Right arm, Patient Position: Lying)   Pulse 84   Temp 37 °C (98.6 °F) (Temporal)   Resp 16     Physical Exam  Vitals and nursing note reviewed.   Constitutional:       General: She is not in acute distress.     Appearance: Normal appearance. She is not ill-appearing.   HENT:      Head: Normocephalic and atraumatic.      Right Ear: External ear normal.      Left Ear: External ear normal.      Nose: Nose normal. No congestion or rhinorrhea.      Mouth/Throat:      Mouth: Mucous membranes are moist.      Pharynx: No oropharyngeal exudate or posterior oropharyngeal erythema.   Eyes:      Extraocular Movements: Extraocular movements intact.      Pupils: Pupils are equal, round, and reactive to light.      Comments: Pale conjunctiva   Cardiovascular:      Rate and Rhythm: Normal rate and regular rhythm.      Pulses: Normal pulses.      Heart sounds: Normal heart sounds.   Pulmonary:      Effort: Pulmonary effort is normal. No respiratory distress.      Breath sounds: Normal breath sounds. No stridor. No wheezing, rhonchi or rales.   Abdominal:      General: There is no distension.      Palpations: Abdomen is soft.      Tenderness: There is no abdominal tenderness. There is no guarding or rebound.   Musculoskeletal:         General: No swelling or deformity. Normal range of motion.      Cervical back: Normal range of motion and neck supple. No rigidity.      Right lower leg: No edema.      Left lower leg: No edema.       Comments: No calf tenderness    Skin:     General: Skin is warm and dry.      Capillary Refill: Capillary refill takes less than 2 seconds.      Coloration: Skin is not jaundiced.      Findings: No rash.   Neurological:      General: No focal deficit present.      Mental Status: She is alert and oriented to person, place, and time.      Sensory: No sensory deficit.      Motor: No weakness.   Psychiatric:         Mood and Affect: Mood normal.         Behavior: Behavior normal.        Past Medical History:   Diagnosis Date    Hypothyroidism, unspecified 01/15/2016    Hypothyroidism    Personal history of diseases of the blood and blood-forming organs and certain disorders involving the immune mechanism 11/13/2015    History of anemia    Personal history of other diseases of the circulatory system 01/15/2016    History of coronary atherosclerosis    Personal history of other diseases of the circulatory system 01/15/2016    History of hypertension    Personal history of other diseases of the musculoskeletal system and connective tissue 11/13/2015    History of rheumatoid arthritis    Pulmonary hypertension, unspecified (Multi) 01/15/2016    Pulmonary hypertension      Past Surgical History:   Procedure Laterality Date    AORTIC VALVE REPLACEMENT  01/15/2016    Aortic Valve Replacement    CORONARY ANGIOPLASTY  01/15/2016    PTCA    CT ABDOMEN PELVIS ANGIOGRAM W AND/OR WO IV CONTRAST  11/6/2015    CT ABDOMEN PELVIS ANGIOGRAM W AND/OR WO IV CONTRAST 11/6/2015 [facility]    ESOPHAGOGASTRODUODENOSCOPY  01/15/2016    Diagnostic Esophagogastroduodenoscopy    HYSTERECTOMY  01/15/2016    Hysterectomy    KNEE SURGERY  01/15/2016    Knee Surgery Left    MR HEAD ANGIO WO IV CONTRAST  1/22/2023    MR HEAD ANGIO WO IV CONTRAST 1/22/2023 [facility]    MR NECK ANGIO WO IV CONTRAST  1/22/2023    MR NECK ANGIO WO IV CONTRAST 1/22/2023 [facility]    OTHER SURGICAL HISTORY  05/19/2022    Appendectomy    OTHER  SURGICAL HISTORY  05/19/2022    Cataract surgery    OTHER SURGICAL HISTORY  05/19/2022    Colonoscopy    OTHER SURGICAL HISTORY  05/19/2022    Hip replacement    OTHER SURGICAL HISTORY  05/19/2022    Tonsillectomy      Social History     Socioeconomic History    Marital status:      Spouse name: Not on file    Number of children: Not on file    Years of education: Not on file    Highest education level: Not on file   Occupational History    Not on file   Tobacco Use    Smoking status: Former     Types: Cigarettes    Smokeless tobacco: Never   Substance and Sexual Activity    Alcohol use: Not on file    Drug use: Not on file    Sexual activity: Not Currently   Other Topics Concern    Not on file   Social History Narrative    Not on file     Social Determinants of Health     Financial Resource Strain: Low Risk  (4/17/2024)    Overall Financial Resource Strain (CARDIA)     Difficulty of Paying Living Expenses: Not hard at all   Food Insecurity: Not on file   Transportation Needs: No Transportation Needs (4/17/2024)    PRAPARE - Transportation     Lack of Transportation (Medical): No     Lack of Transportation (Non-Medical): No   Physical Activity: Not on file   Stress: Not on file   Social Connections: Not on file   Intimate Partner Violence: Not on file   Housing Stability: Low Risk  (4/17/2024)    Housing Stability Vital Sign     Unable to Pay for Housing in the Last Year: No     Number of Places Lived in the Last Year: 1     Unstable Housing in the Last Year: No      ------------------------------------------------------------------------------------------------------------------------------------------  XR chest 1 view   Final Result   1.  Cardiomegaly.   Signed by Evaristo Yarbrough MD         Labs Reviewed   CBC WITH AUTO DIFFERENTIAL - Abnormal       Result Value    WBC 9.1      nRBC 0.0      RBC 2.53 (*)     Hemoglobin 6.8 (*)     Hematocrit 22.1 (*)     MCV 87      MCH 26.9      MCHC 30.8 (*)     RDW 14.0       Platelets 184      Neutrophils % 81.6      Immature Granulocytes %, Automated 0.4      Lymphocytes % 11.8      Monocytes % 5.0      Eosinophils % 0.9      Basophils % 0.3      Neutrophils Absolute 7.39 (*)     Immature Granulocytes Absolute, Automated 0.04      Lymphocytes Absolute 1.07      Monocytes Absolute 0.45      Eosinophils Absolute 0.08      Basophils Absolute 0.03     COMPREHENSIVE METABOLIC PANEL - Abnormal    Glucose 132 (*)     Sodium 133 (*)     Potassium 4.4      Chloride 102      Bicarbonate 22      Anion Gap 13      Urea Nitrogen 58 (*)     Creatinine 1.50 (*)     eGFR 32 (*)     Calcium 7.9 (*)     Albumin 3.1 (*)     Alkaline Phosphatase 44      Total Protein 5.1 (*)     AST 17      Bilirubin, Total 0.5      ALT 11      Narrative:     Delta Check investigation completed per policy for CRE[facility].   TROPONIN I, HIGH SENSITIVITY - Abnormal    Troponin I, High Sensitivity 28 (*)     Narrative:     Less than 99th percentile of normal range cutoff-  Female and children under 18 years old <14 ng/L; Male <21 ng/L: Negative  Repeat testing should be performed if clinically indicated.     Female and children under 18 years old 14-50 ng/L; Male 21-50 ng/L:  Consistent with possible cardiac damage and possible increased clinical   risk. Serial measurements may help to assess extent of myocardial damage.     >50 ng/L: Consistent with cardiac damage, increased clinical risk and  myocardial infarction. Serial measurements may help assess extent of   myocardial damage.      NOTE: Children less than 1 year old may have higher baseline troponin   levels and results should be interpreted in conjunction with the overall   clinical context.     NOTE: Troponin I testing is performed using a different   testing methodology at [facility] than at other   [facility]. Direct result comparisons should only   be made within the same method.   LACTATE - Abnormal    Lactate 2.6 (*)     Narrative:      Venipuncture immediately after or during the administration of Metamizole may lead to falsely low results. Testing should be performed immediately  prior to Metamizole dosing.   B-TYPE NATRIURETIC PEPTIDE - Abnormal     (*)     Narrative:        <100 pg/mL - Heart failure unlikely  100-299 pg/mL - Intermediate probability of acute heart                  failure exacerbation. Correlate with clinical                  context and patient history.    >=300 pg/mL - Heart Failure likely. Correlate with clinical                  context and patient history.    BNP testing is performed using different testing methodology at [facility] than at other [facility]. Direct result comparisons should only be made within the same method.      TROPONIN I, HIGH SENSITIVITY - Abnormal    Troponin I, High Sensitivity 30 (*)     Narrative:     Less than 99th percentile of normal range cutoff-  Female and children under 18 years old <14 ng/L; Male <21 ng/L: Negative  Repeat testing should be performed if clinically indicated.     Female and children under 18 years old 14-50 ng/L; Male 21-50 ng/L:  Consistent with possible cardiac damage and possible increased clinical   risk. Serial measurements may help to assess extent of myocardial damage.     >50 ng/L: Consistent with cardiac damage, increased clinical risk and  myocardial infarction. Serial measurements may help assess extent of   myocardial damage.      NOTE: Children less than 1 year old may have higher baseline troponin   levels and results should be interpreted in conjunction with the overall   clinical context.     NOTE: Troponin I testing is performed using a different   testing methodology at [facility] than at other   [facility]. Direct result comparisons should only   be made within the same method.   MAGNESIUM - Normal    Magnesium 1.98     SARS-COV-2 AND INFLUENZA A/B PCR - Normal    Flu A Result Not Detected      Flu B Result  Not Detected      Coronavirus 2019, PCR Not Detected      Narrative:     This assay has received FDA Emergency Use Authorization (EUA) and  is only authorized for the duration of time that circumstances exist to justify the authorization of the emergency use of in vitro diagnostic tests for the detection of SARS-CoV-2 virus and/or diagnosis of COVID-19 infection under section 564(b)(1) of the Act, 21 U.S.C. 360bbb-3(b)(1). Testing for SARS-CoV-2 is only recommended for patients who meet current clinical and/or epidemiological criteria as defined by federal, state, or local public health directives. This assay is an in vitro diagnostic nucleic acid amplification test for the qualitative detection of SARS-CoV-2, Influenza A, and Influenza B from nasopharyngeal specimens and has been validated for use at [facility]. Negative results do not preclude COVID-19 infections or Influenza A/B infections, and should not be used as the sole basis for diagnosis, treatment, or other management decisions. If Influenza A/B and RSV PCR results are negative, testing for Parainfluenza virus, Adenovirus and Metapneumovirus is routinely performed for [facility] [place] in[place], and is available on other patients by placing an add-on request.    PROTIME-INR - Normal    Protime 12.4      INR 1.1     APTT - Normal    aPTT 30      Narrative:     The APTT is no longer used for monitoring Unfractionated Heparin Therapy. For monitoring Heparin Therapy, use the Heparin Assay.   LACTATE - Normal    Lactate 1.9      Narrative:     Venipuncture immediately after or during the administration of Metamizole may lead to falsely low results. Testing should be performed immediately  prior to Metamizole dosing.   TYPE AND SCREEN    ABO TYPE A      Rh TYPE NEG      ANTIBODY SCREEN NEG     URINALYSIS WITH REFLEX CULTURE AND MICROSCOPIC    Narrative:     The following orders were created for panel order  Urinalysis with Reflex Culture and Microscopic.  Procedure                               Abnormality         Status                     ---------                               -----------         ------                     Urinalysis with Reflex C...[[id number]]                                                 Extra Urine Gray Tube[[id number]]                                                         Please view results for these tests on the individual orders.   URINALYSIS WITH REFLEX CULTURE AND MICROSCOPIC   EXTRA URINE GRAY TUBE   IRON AND TIBC   FERRITIN   RETICULOCYTES   VITAMIN B12   FOLATE   PREPARE RBC    PRODUCT CODE [id number]      Unit Number [id number]      Unit ABO A      Unit RH NEG      XM INTEP COMP      Dispense Status IS      Blood Expiration Date May 02, 2024 23:59 EDT      PRODUCT BLOOD TYPE 0600      UNIT VOLUME 278          Medical Decision Making  EKG interpreted by ED physician: Normal sinus rhythm rate of 80.  NE, QRS, QTc intervals are all within normal limits.  No significant ST elevations or depressions.  No significant Q waves.  Good R wave progression.  Left axis deviation.  Findings of LVH.    89-year-old female presents emergency department with chief complaint of bright red blood per rectum and low hemoglobin per care/rehab facility.  On my exam the patient is afebrile and nontoxic.  Her abdomen is benign without rebound, rigidity, guarding, or distention.  A thorough workup is obtained given her presenting symptoms.  Initial lactate is mildly elevated likely secondary to dehydration.  Repeat after gentle fluid bolus is improved.  I do not suspect ischemic cause of her symptoms.  Influenza and COVID testing are negative.  CMP shows findings of acute kidney injury and dehydration.  CBC does not show significant leukocytosis, but does show significant anemia less than 7 this is downtrending.  Informed consent is obtained for blood products.  2 units of packed red blood cells is ordered.   Patient was hemodynamically stable while here in the emergency department.  Chest x-ray does not show acute cardiopulmonary process such as pneumonia, pleural effusion, or pulmonary edema.  Patient is appreciated to have some cardiomegaly and BNP is elevated though nonspecific given the patient's kidney function.  Given her findings of GI bleed, anemia, acute kidney injury, and being on Xarelto I do feel she would benefit from admission for further evaluation and treatment.  She is in agreement with this plan.  Case discussed with hospitalist on-call.       Diagnoses as of 04/19/24 1221   Bright red blood per rectum   Anemia, unspecified type   Acute kidney injury (CMS-HCC)      1. Bright red blood per rectum        2. Anemia, unspecified type        3. Acute kidney injury (CMS-HCC)           Procedures     This note was dictated using dragon software and may contain errors related to dictation interpretation errors.      Israel Neves, DO  04/19/24 1221

## 2024-04-19 NOTE — CARE PLAN
Problem: Skin  Goal: Decreased wound size/increased tissue granulation at next dressing change  Outcome: Progressing  Flowsheets (Taken 4/19/2024 1451)  Decreased wound size/increased tissue granulation at next dressing change: Promote sleep for wound healing  Goal: Participates in plan/prevention/treatment measures  Outcome: Progressing  Flowsheets (Taken 4/19/2024 1451)  Participates in plan/prevention/treatment measures:   Discuss with provider PT/OT consult   Elevate heels   Increase activity/out of bed for meals  Goal: Prevent/manage excess moisture  Outcome: Progressing  Flowsheets (Taken 4/19/2024 1451)  Prevent/manage excess moisture: Cleanse incontinence/protect with barrier cream  Goal: Prevent/minimize sheer/friction injuries  Outcome: Progressing  Flowsheets (Taken 4/19/2024 1451)  Prevent/minimize sheer/friction injuries:   HOB 30 degrees or less   Turn/reposition every 2 hours/use positioning/transfer devices   Use pull sheet   Increase activity/out of bed for meals  Goal: Promote/optimize nutrition  Outcome: Progressing  Flowsheets (Taken 4/19/2024 1451)  Promote/optimize nutrition:   Consume > 50% meals/supplements   Monitor/record intake including meals  Goal: Promote skin healing  Outcome: Progressing   The patient's goals for the shift include      The clinical goals for the shift include be hemodynamically stable      Problem: Pain - Adult  Goal: Verbalizes/displays adequate comfort level or baseline comfort level  Outcome: Progressing     Problem: Safety - Adult  Goal: Free from fall injury  Outcome: Progressing     Problem: Discharge Planning  Goal: Discharge to home or other facility with appropriate resources  Outcome: Progressing     Problem: Chronic Conditions and Co-morbidities  Goal: Patient's chronic conditions and co-morbidity symptoms are monitored and maintained or improved  Outcome: Progressing

## 2024-04-19 NOTE — CONSULTS
Department of Internal Medicine  Gastroenterology  Consult note    IMPRESSION/RECOMMENDATIONS  Gracie Carlson is a 89 y.o. female with a PMH of osteoporosis, recent fall with right periprosthetic hip fracture (nonsurgical), hypothyroidism, HTN, HLD, mild COPD, aortic valve stenosis, TAVR, CAD, CHF presents to the emergency department of [facility] from skilled nursing facility with concern for painless rectal bleeding described as bright red blood per rectum and Hgb of 6.8.  Hemoglobin baseline 10.  Iron 20, TIBC 334, iron saturation 6%.  Ferritin 37.      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  Patient and daughter decline endoscopies at this time.  -Recommend iron infusions  -Supportive care per medicine team-IVFs, antiemetic, pain medication  -Recommend primary team weigh risk versus benefits of continuing Xarelto due to risk of bleeding.  Baby aspirin okay.  Likely Xarelto aggravated angioectasia in the colon  -May resume diet  -Avoid constipation    Discussed case with Dr. Agrawal     Reason for Consult: GI has been consulted for BRBPR with anemia.     History of Present Illness      Gracie Carlson is a 89 y.o. female with a PMH of osteoporosis, recent fall with right periprosthetic hip fracture (nonsurgical), hypothyroidism, HTN, HLD, mild COPD, aortic valve stenosis, TAVR, CAD, CHF presents to the emergency department of [facility] from skilled nursing facility with concern for painless rectal bleeding described as bright red blood per rectum and Hgb of 6.8.  Hemoglobin baseline 10.      Patient had episode x 2 of BRBPR after bowel  movement.  Nursing reports toilet bowl was full of bright red blood. This happened at bedtime and again this morning after breakfast. She denied fever, chills, abdominal pain, nausea, vomiting or diarrhea.  Does endorse occasional constipation.  She denies chest pain, shortness of breath, lightheadedness.  Patient is on baby aspirin daily along with low-dose Xarelto for DVT prophylaxis from fractured hip and immobility. Patient does have a history of GI bleeding in the past (2014) 2/2 AVM in the cecum. Patient was monitored by [facility].  Patient received blood transfusion and iron infusion at that time, able to sustain hemoglobin between 10-11.  Patient AVM of cecum was not treated.      ENDOSCOPIC REVIEW  Colonoscopy 2014 with NSG found to have AVM in the cecum and a polyp was removed from the cecum (unknown pathology). EGD examination was done and it showed gastritis and she was  advised to continue iron pills and monitor her H&H. Iron therapy was able to sustain blood counts so there was no need to do any therapy for AVM. Hgb was up to 11.7 g and no reports of melena or hematochezia.      Allergies  Oxycodone and Sulfa (sulfonamide antibiotics)    Current Medication    Current Facility-Administered Medications:     acetaminophen (Tylenol) tablet 650 mg, 650 mg, oral, q4h PRN, Samir Gama MD    atorvastatin (Lipitor) tablet 20 mg, 20 mg, oral, Nightly, Samir Gama MD    cyanocobalamin (Vitamin B-12) tablet 1,000 mcg, 1,000 mcg, oral, Daily, Samir Gama MD    fluticasone furoate-vilanteroL (Breo Ellipta) 100-25 mcg/dose inhaler 1 puff, 1 puff, inhalation, Daily, Samir Gama MD    HYDROcodone-acetaminophen (Norco) 5-325 mg per tablet 1 tablet, 1 tablet, oral, q6h PRN, Samir Gama MD    [START ON 4/20/2024] iron sucrose (Venofer) injection 200 mg, 200 mg, intravenous, Daily, Samir Gama MD    lactated Ringer's infusion, 75 mL/hr, intravenous, Continuous, Samir Gama MD     ondansetron (Zofran) tablet 4 mg, 4 mg, oral, q8h PRN **OR** ondansetron (Zofran) injection 4 mg, 4 mg, intravenous, q8h PRN, Samir Gama MD    [START ON 4/20/2024] pantoprazole (ProtoNix) EC tablet 40 mg, 40 mg, oral, Daily before breakfast, Samir Gama MD    tiotropium (Spiriva Respimat) 2.5 mcg/actuation inhaler 2 puff, 2 puff, inhalation, Daily, Samir Gama MD    Past Medical History  Active Ambulatory Problems     Diagnosis Date Noted    Aortic valve stenosis, severe 10/16/2023    Asthma (HHS-HCC) 10/16/2023    Balance problem 10/16/2023    Benign essential HTN 10/16/2023    CAD (coronary artery disease) 10/16/2023    Chronic diastolic congestive heart failure (Multi) 10/16/2023    Chronic obstructive pulmonary disease (Multi) 10/16/2023    Dysphonia 10/16/2023    Eye problem 10/16/2023    Generalized arthritis 10/16/2023    Heart disease 10/16/2023    Hypothyroidism 10/16/2023    Mitral valve stenosis 10/16/2023    Mixed hyperlipidemia 10/16/2023    Periprosth fracture around unsp internal prosth joint, subs 10/16/2023    Localized, primary osteoarthritis of shoulder region 10/16/2023    Rotator cuff tear arthropathy of right shoulder 10/16/2023    Primary osteoarthritis of right shoulder 10/16/2023    Pulmonary hypertension (Multi) 10/16/2023    S/P TAVR (transcatheter aortic valve replacement) 10/16/2023    Weakness of both hips 10/16/2023    Former smoker 10/16/2023    Fall, initial encounter 04/16/2024    [place]-prosthetic fracture around prosthetic hip 04/19/2024    Impaired mobility and ADLs 04/19/2024    Pain 04/19/2024    Slow transit constipation 04/19/2024     Resolved Ambulatory Problems     Diagnosis Date Noted    No Resolved Ambulatory Problems     Past Medical History:   Diagnosis Date    Hypothyroidism, unspecified 01/15/2016    Personal history of diseases of the blood and blood-forming organs and certain disorders involving the immune mechanism 11/13/2015    Personal history of other  diseases of the circulatory system 01/15/2016    Personal history of other diseases of the circulatory system 01/15/2016    Personal history of other diseases of the musculoskeletal system and connective tissue 11/13/2015    Pulmonary hypertension, unspecified (Multi) 01/15/2016       Past Surgical History  Past Surgical History:   Procedure Laterality Date    AORTIC VALVE REPLACEMENT  01/15/2016    Aortic Valve Replacement    CORONARY ANGIOPLASTY  01/15/2016    PTCA    CT ABDOMEN PELVIS ANGIOGRAM W AND/OR WO IV CONTRAST  11/6/2015    CT ABDOMEN PELVIS ANGIOGRAM W AND/OR WO IV CONTRAST 11/6/2015 [facility]    ESOPHAGOGASTRODUODENOSCOPY  01/15/2016    Diagnostic Esophagogastroduodenoscopy    HYSTERECTOMY  01/15/2016    Hysterectomy    KNEE SURGERY  01/15/2016    Knee Surgery Left    MR HEAD ANGIO WO IV CONTRAST  1/22/2023    MR HEAD ANGIO WO IV CONTRAST 1/22/2023 [facility]    MR NECK ANGIO WO IV CONTRAST  1/22/2023    MR NECK ANGIO WO IV CONTRAST 1/22/2023 [facility]    OTHER SURGICAL HISTORY  05/19/2022    Appendectomy    OTHER SURGICAL HISTORY  05/19/2022    Cataract surgery    OTHER SURGICAL HISTORY  05/19/2022    Colonoscopy    OTHER SURGICAL HISTORY  05/19/2022    Hip replacement    OTHER SURGICAL HISTORY  05/19/2022    Tonsillectomy       Family History  Family History   Problem Relation Name Age of Onset    Hypertension Mother      Heart disease Father          RHEUMATIC    Hypertension Father      Coronary artery disease Father      Other (ENDOCARDITIS) Father       No family history of GI malignancies       Social History  TOBACCO:  reports that she has quit smoking. Her smoking use included cigarettes. She has never used smokeless tobacco.  ETOH:  has no history on file for alcohol use.  DRUGS:  has no history on file for drug use.    Review of Systems  Review of systems negative unless otherwise stated above.    PHYSICAL EXAM  VS: /63   Pulse 101   Temp 36.9 °C (98.4  °F) (Temporal)   Resp 16   Ht 1.524 m (5')   Wt 50 kg (110 lb 3.7 oz)   SpO2 96%   BMI 21.53 kg/m²  Body mass index is 21.53 kg/m².    Constitutional-frail, elderly, thin, debility, no acute distress  HEENT- No mouth sores or oral cavities noted. No scleral icterus.   Res- Non labored breathing, lungs CTA   Cardiac- RRR, no murmurs or gallops   Abdominal- Soft, nondistended, nontender abdomen with positive bowel sounds x4 quadrants, negative Roberts Sign.   - Deferred   Rectal-red blood  Peripheral- Extremities warm with positive pedal pulses, no edema noted.   Skin- Non-jaundiced.    Neuro- Patient alert and oriented x2-3, pays attention to caregiver, HAUSER x4.   Psychological- Patient pleasant and cooperative, behavior is appropriate to situation.     DATA  Recent blood work and relevant radiology and endoscopic studies were reviewed and discussed with the patient   Results from last 7 days   Lab Units 04/19/24  0954   WBC AUTO x10*3/uL 9.1   RBC AUTO x10*6/uL 2.53*   HEMOGLOBIN g/dL 6.8*   HEMATOCRIT % 22.1*   MCV fL 87   MCHC g/dL 30.8*   RDW % 14.0   PLATELETS AUTO x10*3/uL 184       Results from last 72 hours   Lab Units 04/19/24  0954   SODIUM mmol/L 133*   POTASSIUM mmol/L 4.4   CHLORIDE mmol/L 102   CO2 mmol/L 22   BUN mg/dL 58*   CREATININE mg/dL 1.50*   CALCIUM mg/dL 7.9*   PROTEIN TOTAL g/dL 5.1*   BILIRUBIN TOTAL mg/dL 0.5   ALK PHOS U/L 44   AST U/L 17   ALT U/L 11       Results from last 72 hours   Lab Units 04/19/24  0954   INR  1.1         RADIOLOGY REVIEW  === 04/16/24 ===    CT HIP RIGHT WO IV CONTRAST    - Impression -  Status post right DAISY with newly visualized presumably acute  nondisplaced periprosthetic fracture involving the base of the  proximal femoral greater trochanter. Additional nondisplaced  ill-defined presumably subacute and/or healed proximal femoral  fractures otherwise similar to 03/08/2022.    Heterogenous left adnexal mass as noted on CT pelvis  reported  separately.    MACRO:  None    Signed by: Maren Victoria 4/16/2024 11:46 AM  Dictation workstation:   [id number]       (Electronically signed byWAQAS Townsend on 4/19/2024 at 2:27 PM)

## 2024-04-19 NOTE — Clinical Note
Patient tolerated procedure well. Appears comfortable with no complaints of pain. VS stable. Arousable prior to transport. Patient transported to [facility] via cart.  Report called  11 francois iglesias            . Handoff completed

## 2024-04-20 LAB
ANION GAP SERPL CALC-SCNC: 11 MMOL/L (ref 10–20)
BUN SERPL-MCNC: 43 MG/DL (ref 6–23)
CALCIUM SERPL-MCNC: 8 MG/DL (ref 8.6–10.3)
CHLORIDE SERPL-SCNC: 106 MMOL/L (ref 98–107)
CO2 SERPL-SCNC: 22 MMOL/L (ref 21–32)
CREAT SERPL-MCNC: 0.91 MG/DL (ref 0.5–1.05)
EGFRCR SERPLBLD CKD-EPI 2021: 58 ML/MIN/1.73M*2
ERYTHROCYTE [DISTWIDTH] IN BLOOD BY AUTOMATED COUNT: 14.5 % (ref 11.5–14.5)
GLUCOSE SERPL-MCNC: 112 MG/DL (ref 74–99)
HCT VFR BLD AUTO: 22.8 % (ref 36–46)
HCT VFR BLD AUTO: 23.5 % (ref 36–46)
HGB BLD-MCNC: 7.5 G/DL (ref 12–16)
HGB BLD-MCNC: 7.6 G/DL (ref 12–16)
MCH RBC QN AUTO: 28.2 PG (ref 26–34)
MCHC RBC AUTO-ENTMCNC: 32.9 G/DL (ref 32–36)
MCV RBC AUTO: 86 FL (ref 80–100)
NRBC BLD-RTO: 0 /100 WBCS (ref 0–0)
PLATELET # BLD AUTO: 166 X10*3/UL (ref 150–450)
POTASSIUM SERPL-SCNC: 4.2 MMOL/L (ref 3.5–5.3)
RBC # BLD AUTO: 2.66 X10*6/UL (ref 4–5.2)
SODIUM SERPL-SCNC: 135 MMOL/L (ref 136–145)
WBC # BLD AUTO: 9.4 X10*3/UL (ref 4.4–11.3)

## 2024-04-20 PROCEDURE — 2500000004 HC RX 250 GENERAL PHARMACY W/ HCPCS (ALT 636 FOR OP/ED): Performed by: INTERNAL MEDICINE

## 2024-04-20 PROCEDURE — 85014 HEMATOCRIT: CPT | Performed by: INTERNAL MEDICINE

## 2024-04-20 PROCEDURE — 99232 SBSQ HOSP IP/OBS MODERATE 35: CPT | Performed by: INTERNAL MEDICINE

## 2024-04-20 PROCEDURE — 97165 OT EVAL LOW COMPLEX 30 MIN: CPT | Mod: GO

## 2024-04-20 PROCEDURE — 84132 ASSAY OF SERUM POTASSIUM: CPT | Performed by: INTERNAL MEDICINE

## 2024-04-20 PROCEDURE — 2500000001 HC RX 250 WO HCPCS SELF ADMINISTERED DRUGS (ALT 637 FOR MEDICARE OP): Performed by: INTERNAL MEDICINE

## 2024-04-20 PROCEDURE — 2500000004 HC RX 250 GENERAL PHARMACY W/ HCPCS (ALT 636 FOR OP/ED)

## 2024-04-20 PROCEDURE — 85027 COMPLETE CBC AUTOMATED: CPT | Performed by: INTERNAL MEDICINE

## 2024-04-20 PROCEDURE — 36415 COLL VENOUS BLD VENIPUNCTURE: CPT | Performed by: INTERNAL MEDICINE

## 2024-04-20 PROCEDURE — 1210000001 HC SEMI-PRIVATE ROOM DAILY

## 2024-04-20 PROCEDURE — 97161 PT EVAL LOW COMPLEX 20 MIN: CPT | Mod: GP

## 2024-04-20 RX ORDER — LOSARTAN POTASSIUM 50 MG/1
50 TABLET ORAL NIGHTLY
Status: DISCONTINUED | OUTPATIENT
Start: 2024-04-20 | End: 2024-04-24 | Stop reason: HOSPADM

## 2024-04-20 RX ADMIN — ATORVASTATIN CALCIUM 20 MG: 20 TABLET, FILM COATED ORAL at 21:19

## 2024-04-20 RX ADMIN — TIOTROPIUM BROMIDE INHALATION SPRAY 2 PUFF: 3.12 SPRAY, METERED RESPIRATORY (INHALATION) at 06:41

## 2024-04-20 RX ADMIN — IRON SUCROSE 200 MG: 20 INJECTION, SOLUTION INTRAVENOUS at 10:11

## 2024-04-20 RX ADMIN — PANTOPRAZOLE SODIUM 40 MG: 40 TABLET, DELAYED RELEASE ORAL at 06:41

## 2024-04-20 RX ADMIN — CYANOCOBALAMIN TAB 500 MCG 1000 MCG: 500 TAB at 09:35

## 2024-04-20 RX ADMIN — FLUTICASONE FUROATE AND VILANTEROL TRIFENATATE 1 PUFF: 100; 25 POWDER RESPIRATORY (INHALATION) at 06:41

## 2024-04-20 RX ADMIN — LOSARTAN POTASSIUM 50 MG: 50 TABLET, FILM COATED ORAL at 21:19

## 2024-04-20 ASSESSMENT — COGNITIVE AND FUNCTIONAL STATUS - GENERAL
MOVING TO AND FROM BED TO CHAIR: A LITTLE
PERSONAL GROOMING: A LITTLE
DRESSING REGULAR UPPER BODY CLOTHING: A LITTLE
TOILETING: A LITTLE
HELP NEEDED FOR BATHING: A LOT
MOBILITY SCORE: 20
MOVING TO AND FROM BED TO CHAIR: A LITTLE
WALKING IN HOSPITAL ROOM: A LITTLE
STANDING UP FROM CHAIR USING ARMS: A LITTLE
TURNING FROM BACK TO SIDE WHILE IN FLAT BAD: A LITTLE
STANDING UP FROM CHAIR USING ARMS: A LITTLE
WALKING IN HOSPITAL ROOM: A LITTLE
MOVING FROM LYING ON BACK TO SITTING ON SIDE OF FLAT BED WITH BEDRAILS: A LITTLE
TOILETING: A LOT
PERSONAL GROOMING: A LITTLE
HELP NEEDED FOR BATHING: A LITTLE
TOILETING: A LITTLE
CLIMB 3 TO 5 STEPS WITH RAILING: TOTAL
DAILY ACTIVITIY SCORE: 19
CLIMB 3 TO 5 STEPS WITH RAILING: A LOT
DAILY ACTIVITIY SCORE: 19
DRESSING REGULAR UPPER BODY CLOTHING: A LITTLE
WALKING IN HOSPITAL ROOM: A LITTLE
CLIMB 3 TO 5 STEPS WITH RAILING: A LITTLE
DRESSING REGULAR LOWER BODY CLOTHING: A LOT
DRESSING REGULAR UPPER BODY CLOTHING: A LITTLE
DAILY ACTIVITIY SCORE: 16
MOBILITY SCORE: 16
PERSONAL GROOMING: A LITTLE
STANDING UP FROM CHAIR USING ARMS: A LITTLE
MOBILITY SCORE: 20
DRESSING REGULAR LOWER BODY CLOTHING: A LITTLE
DRESSING REGULAR LOWER BODY CLOTHING: A LITTLE
HELP NEEDED FOR BATHING: A LITTLE

## 2024-04-20 ASSESSMENT — ACTIVITIES OF DAILY LIVING (ADL): BATHING_ASSISTANCE: MAXIMAL

## 2024-04-20 ASSESSMENT — PAIN - FUNCTIONAL ASSESSMENT
PAIN_FUNCTIONAL_ASSESSMENT: 0-10

## 2024-04-20 ASSESSMENT — PAIN SCALES - GENERAL
PAINLEVEL_OUTOF10: 0 - NO PAIN

## 2024-04-20 NOTE — PROGRESS NOTES
ASSESSMENT & PLAN:     Painless hematochezia  Acute on chronic iron deficiency anemia due to blood loss  -had hematochezia at rehab center, Hgb was 6.3 (was 7-10 in April, 10-11.5 in Jan)  -was new start xarelto for vte ppx after recent periprosthetic hip fx, and was taking asa 81  -per GI review, she has known hx of coloniv avm, new start ppx dose AC might have caused this to bleed  -iron studies c/w CHIQUIS  -s/p 1u pRBC this admission  Plan:  -gi consult, plan for conservative mgmt for now as she is stable, and would be tough for her to tolerate bowel prep. Pt and family would like to hold off for now.    -IV venofer  -can stop ivf today  -hold antiplatelet, ac agents  -Hgb 7.5 today, slight downtrend, rpt cbc in PM, transfuse for Hgb <7     FRANCES  -Scr 1.50 on admission, bl normal, likely prerenal in s/o bleeding  -resolved with volume resuscitation, can stop ivf now    B12 deficiency - B12 level here 187, cont PO supplementation  HTN - resume home losartan  CAD s/p remote PCI, DLD - hold ASA 81, cont statin  Chronic HFpEF - appears euvolemic, resume home losartan  COPD - not in acute exacerbation, cont home inhalers  R periprosthetic hip fx - pt/ot, presumably dispo is back to ar once bleeding resolved, keep outpt fu with ortho, pain control prn. Consulted ortho re vte ppx recs going forward as she did not tolerate Xarelto, however spoke with them informally, they said they have nothing to recommend and would like consult removed. I will stop Xarelto on discharge as risks outweigh benefits given admission for symptomatic lgib requiring blood transfusion, discussed with pt and daughter.   Vte ppx scds  Dnr/dni, okay with icu, discussed with pt    Samir Gama MD    SUBJECTIVE     Had maybe small amount of hematochezia this morning, although after speaking with nurse, seems it may have been just dark brown stool, did not see bright red blood. No other changes. Otherwise feels well.     OBJECTIVE:       Last Recorded  Vitals:  Vitals:    04/19/24 1320 04/19/24 1920 04/19/24 1921 04/20/24 0529   BP: 146/63  163/70 162/88   BP Location:   Left arm Right arm   Patient Position:   Sitting Lying   Pulse: 101 94 93 86   Resp:  18 18 21   Temp: 36.9 °C (98.4 °F) 37.7 °C (99.9 °F) 37.7 °C (99.9 °F) 36.3 °C (97.3 °F)   TempSrc: Temporal  Temporal Temporal   SpO2: 96%  96% 96%   Weight:       Height:           Last I/O:  I/O last 3 completed shifts:  In: 811.2 (16.2 mL/kg) [I.V.:22.5 (0.5 mL/kg); Blood:288.7; IV Piggyback:500]  Out: - (0 mL/kg)   Weight: 50 kg     Physical Exam:  GEN: appears stated age, NAD  CV:  no LE edema  LUNGS: CTAB, no w/r/c, no resp distress, on ra  ABD: soft, NT  SKIN: scattered bruising  MSK; no gross deformities, normal joints  NEURO: A+Ox3, no FND  PSYCH: appropriate mood, affect    Inpatient Medications:  atorvastatin, 20 mg, oral, Nightly  cyanocobalamin, 1,000 mcg, oral, Daily  fluticasone furoate-vilanteroL, 1 puff, inhalation, Daily  iron sucrose, 200 mg, intravenous, Daily  pantoprazole, 40 mg, oral, Daily before breakfast  tiotropium, 2 puff, inhalation, Daily        PRN Medications  PRN medications: acetaminophen, HYDROcodone-acetaminophen, ondansetron **OR** ondansetron    Continuous Medications:         LABS AND IMAGING:     Labs:  Results for orders placed or performed during the hospital encounter of 04/19/24 (from the past 24 hour(s))   Troponin I, High Sensitivity   Result Value Ref Range    Troponin I, High Sensitivity 30 (H) 0 - 13 ng/L   Lactate   Result Value Ref Range    Lactate 1.9 0.4 - 2.0 mmol/L   CBC   Result Value Ref Range    WBC 10.7 4.4 - 11.3 x10*3/uL    nRBC 0.0 0.0 - 0.0 /100 WBCs    RBC 2.79 (L) 4.00 - 5.20 x10*6/uL    Hemoglobin 7.9 (L) 12.0 - 16.0 g/dL    Hematocrit 24.0 (L) 36.0 - 46.0 %    MCV 86 80 - 100 fL    MCH 28.3 26.0 - 34.0 pg    MCHC 32.9 32.0 - 36.0 g/dL    RDW 14.2 11.5 - 14.5 %    Platelets 176 150 - 450 x10*3/uL   Basic Metabolic Panel   Result Value Ref Range     Glucose 112 (H) 74 - 99 mg/dL    Sodium 135 (L) 136 - 145 mmol/L    Potassium 4.2 3.5 - 5.3 mmol/L    Chloride 106 98 - 107 mmol/L    Bicarbonate 22 21 - 32 mmol/L    Anion Gap 11 10 - 20 mmol/L    Urea Nitrogen 43 (H) 6 - 23 mg/dL    Creatinine 0.91 0.50 - 1.05 mg/dL    eGFR 58 (L) >60 mL/min/1.73m*2    Calcium 8.0 (L) 8.6 - 10.3 mg/dL   CBC   Result Value Ref Range    WBC 9.4 4.4 - 11.3 x10*3/uL    nRBC 0.0 0.0 - 0.0 /100 WBCs    RBC 2.66 (L) 4.00 - 5.20 x10*6/uL    Hemoglobin 7.5 (L) 12.0 - 16.0 g/dL    Hematocrit 22.8 (L) 36.0 - 46.0 %    MCV 86 80 - 100 fL    MCH 28.2 26.0 - 34.0 pg    MCHC 32.9 32.0 - 36.0 g/dL    RDW 14.5 11.5 - 14.5 %    Platelets 166 150 - 450 x10*3/uL        Imaging:  ECG 12 lead  Unusual P axis, possible ectopic atrial rhythm  Minimal voltage criteria for LVH, may be normal variant  Abnormal ECG  When compared with ECG of 19-APR-2024 09:46, (unconfirmed)  Ectopic atrial rhythm has replaced Sinus rhythm  See ED provider note for full interpretation and clinical correlation  Confirmed by Carrie Graves ([id number]) on 4/19/2024 6:19:15 PM  XR chest 1 view  Narrative: STUDY:  Chest Radiograph; 04/19/2024 10:44 AM  INDICATION:  Shortness of breath.  COMPARISON:  XR chest 11/14/2022  ACCESSION NUMBER(S):  [id number]  ORDERING CLINICIAN:  JOSEPH LAMAS  TECHNIQUE:  Frontal chest was obtained at 10:44:00 hours.  FINDINGS:  No acute opacities or effusions are visualized.  Heart size is  enlarged.  There is no evidence of pneumothorax.  An aortic valve  prosthesis is present.  Impression: 1.  Cardiomegaly.  Signed by Evaristo Yarbrough MD

## 2024-04-20 NOTE — PROGRESS NOTES
Occupational Therapy    Evaluation    Patient Name: Gracie Carlson  MRN: [id number]  Today's Date: 4/20/2024  Time Calculation  Start Time: 1047  Stop Time: 1100  Time Calculation (min): 13 min        Assessment:  OT Assessment: Decreased strength, endurance, balance  Prognosis: Fair  Barriers to Discharge:  (lives alone)  Evaluation/Treatment Tolerance: Patient tolerated treatment well  Medical Staff Made Aware: Yes  End of Session Communication: Bedside nurse  End of Session Patient Position: Bed, 3 rail up, Alarm on  OT Assessment Results: Decreased ADL status, Decreased upper extremity strength, Decreased endurance, Decreased functional mobility  Prognosis: Fair  Barriers to Discharge:  (lives alone)  Evaluation/Treatment Tolerance: Patient tolerated treatment well  Medical Staff Made Aware: Yes  Plan:  Treatment Interventions: ADL retraining, Functional transfer training, Endurance training, UE strengthening/ROM, Patient/family training  OT Frequency: 2 times per week  OT Discharge Recommendations: Moderate intensity level of continued care, High intensity level of continued care  Equipment Recommended upon Discharge:  (FWW)  OT - OK to Discharge: Yes (ok to d/c to next level of care once medically cleared)  Treatment Interventions: ADL retraining, Functional transfer training, Endurance training, UE strengthening/ROM, Patient/family training    Subjective   Current Problem:  1. Bright red blood per rectum        2. Anemia, unspecified type        3. Acute kidney injury (CMS-HCC)          General:  General  Reason for Referral: self care impairment  Referred By: PT/OT 4/19 Lan[place]  Past Medical History Relevant to Rehab: COPD, HTN, CAD s/p PCI, aortic stenosis s/p TAVR, chronic HFpEF, hypothyroidism  Prior to Session Communication: Bedside nurse  Patient Position Received: Alarm on (seated EOB. Tele)  General Comment: Pt is a 89 y/o F presenting to ED from rehab facility after noticing toilet full of blood  following BM. Pt was recently d/c from [facility] [place] [place] to rehab for R hip pain s/p fall and dx with nondisplaced periprosthetic R hip fx. Nonsurgical. Hgb 7.5, Hct 22.8, XR chest: cardiomegaly.  Precautions:  LE Weight Bearing Status: Other (Comment) (Per therapy evals on 4/17, pt is 50% WB on RLE)  Precautions Comment: Per therapy evals on 4/17, no active R hip abduction     Pain:  Pain Assessment  Pain Assessment: 0-10  Pain Score: 0 - No pain    Objective   Cognition:  Orientation Level: Oriented X4 (appears forgetful, questionable historian)           Home Living:  Home Living Comments: Per pt report, pt lives alone with her dog in in a 2 level house with a 1st floor setup, does not go upstairs. Pt uses garage to enter which has 1-2 steps with HR. Has a walk in shower with a seat and grab bars.  Prior Function:  Prior Function Comments: PTA, pt reports ambulating MOD I using FWW/cane, completing all ADLs IND, was cooking IND, received help from friend with laundry/grocery shopping, hires help for cleaning. Pt reports 1 fall in the past 3 months, does not drive.  ADL:  Eating Assistance: Independent  Grooming Assistance: Minimal  Bathing Assistance: Maximal  UE Dressing Assistance: Minimal  LE Dressing Assistance: Maximal  Toileting Assistance with Device: Moderate  Activity Tolerance:  Endurance: Decreased tolerance for upright activites  Bed Mobility/Transfers: Bed Mobility  Bed Mobility: Yes (Transition from sit EOB to supine with MIN A.)    Transfers  Transfer: Yes (Sit to stand and stand to sit to/from FWW using MIN A and vc for hand placement.)      Functional Mobility:  Functional Mobility  Functional Mobility Performed: Yes (Pt ambulated short functional distances throughout room using FWW with MIN A. Pt required increased time to ambulate.)  Sitting Balance:  Static Sitting Balance  Static Sitting-Comment/Number of Minutes: good-  Dynamic Sitting Balance  Dynamic Sitting-Comments: good-  Standing Balance:  Static  Standing Balance  Static Standing-Comment/Number of Minutes: fair-  Dynamic Standing Balance  Dynamic Standing-Comments: fair-      Strength:  Strength Comments: BUE strength grossly 4/5  Hand Function:  Gross Grasp: Functional  Extremities: RUE   RUE : Within Functional Limits and LUE   LUE: Within Functional Limits    Outcome Measures:[facility] Daily Activity  Putting on and taking off regular lower body clothing: A lot  Bathing (including washing, rinsing, drying): A lot  Putting on and taking off regular upper body clothing: A little  Toileting, which includes using toilet, bedpan or urinal: A lot  Taking care of personal grooming such as brushing teeth: A little  Eating Meals: None  Daily Activity - Total Score: 16    IP EDUCATION:  Education  Individual(s) Educated: Patient  Education Comment: Pt educated on safe functional mobility/transfer techniques. Verbalized/demo good understanding. Requires reinforcement.    Goals:  Encounter Problems       Encounter Problems (Active)       OT Goals       Pt will complete all functional transfers with MOD I. (Progressing)       Start:  04/20/24    Expected End:  05/04/24            Pt will complete LB dressing with MOD I. (Progressing)       Start:  04/20/24    Expected End:  05/04/24            Pt will improve dynamic standing balance to fair+ in prep for ADL tasks while adhering to WB px.  (Progressing)       Start:  04/20/24    Expected End:  05/04/24            Pt will tolerate 10 mins of functional activities with 2 or fewer rest breaks. (Progressing)       Start:  04/20/24    Expected End:  05/04/24            Pt will complete all functional mobility with MOD I in prep for ADL tasks. (Progressing)       Start:  04/20/24    Expected End:  05/04/24

## 2024-04-20 NOTE — PROGRESS NOTES
Physical Therapy    Physical Therapy Evaluation    Patient Name: Gracie Carlson  MRN: [id number]  Today's Date: 4/20/2024   Time Calculation  Start Time: 1048  Stop Time: 1100  Time Calculation (min): 12 min    Assessment/Plan   PT Assessment  PT Assessment Results: Decreased strength, Decreased endurance, Impaired balance, Decreased mobility, Decreased coordination, Decreased cognition, Decreased safety awareness  Rehab Prognosis: Good  Evaluation/Treatment Tolerance: Patient limited by fatigue, Patient limited by pain  End of Session Communication: Bedside nurse  Assessment Comment: pt with decreased mobility/gait strength balance endurance pt to benefit from skilled PT to address deficits and improve functional mobility .  End of Session Patient Position: Bed, 3 rail up, Alarm on  IP OR SWING BED PT PLAN  Inpatient or Swing Bed: Inpatient  PT Plan  Treatment/Interventions: Bed mobility, Transfer training, Gait training, Stair training, Balance training, Endurance training, Strengthening, Therapeutic exercise, Therapeutic activity, Home exercise program  PT Plan: Skilled PT  PT Frequency: 3 times per week  PT Discharge Recommendations: Moderate intensity level of continued care  PT Recommended Transfer Status: Assist x1, Assistive device  PT - OK to Discharge: Yes (once medically ready for discharge to next level of care.)    Subjective     Current Problem:  Patient Active Problem List   Diagnosis    Aortic valve stenosis, severe    Asthma (HHS-HCC)    Balance problem    Benign essential HTN    CAD (coronary artery disease)    Chronic diastolic congestive heart failure (Multi)    Chronic obstructive pulmonary disease (Multi)    Dysphonia    Eye problem    Generalized arthritis    Heart disease    Hypothyroidism    Mitral valve stenosis    Mixed hyperlipidemia    Periprosth fracture around unsp internal prosth joint, subs    Localized, primary osteoarthritis of shoulder region    Rotator cuff tear arthropathy of  right shoulder    Primary osteoarthritis of right shoulder    Pulmonary hypertension (Multi)    S/P TAVR (transcatheter aortic valve replacement)    Weakness of both hips    Former smoker    Fall, initial encounter    [place]-prosthetic fracture around prosthetic hip    Impaired mobility and ADLs    Pain    Slow transit constipation    Bright red blood per rectum       General Visit Information:  General  Reason for Referral: weakness impaired mobility  Referred By: PT/OT 4/19 Lanette  Past Medical History Relevant to Rehab: COPD, HTN, CAD s/p PCI, aortic stenosis s/p TAVR, chronic HFpEF, hypothyroidism  Prior to Session Communication: Bedside nurse (cleared for therapy evals.)  Patient Position Received: Alarm on (tele)  General Comment: Pt is a 89 y/o F presenting to ED from rehab facility after noticing toilet full of blood following BM. Pt was recently d/c from [facility] to rehab for R hip pain s/p fall and dx with nondisplaced periprosthetic R hip fx. Nonsurgical. Hgb 7.5, Hct 22.8, XR chest: cardiomegaly.    Home Living:  Home Living  Home Living Comments: Per pt report, pt lives alone with her dog in in a 2 level house with a 1st floor setup, does not go upstairs. Pt uses garage to enter which has 1-2 steps with HR. Has a walk in shower with a seat and grab bars.    Prior Level of Function:  Prior Function Per Pt/Caregiver Report  Prior Function Comments: PTA, pt reports ambulating MOD I using FWW/cane, completing all ADLs IND, was cooking IND, received help from friend with laundry/grocery shopping, hires help for cleaning. Pt reports 1 fall in the past 3 months, does not drive.    Precautions:  Precautions  LE Weight Bearing Status: Other (Comment) (50 % wt bearing R LE)  Precautions Comment: Per therapy evals on 4/17, no active R hip abduction    Objective     Pain:  Pain Assessment  Pain Assessment: 0-10  Pain Score: 0 - No pain    Cognition:  Cognition  Orientation Level: Oriented X4 (very forgetful)  Processing  Speed: Delayed    General Assessments:      Activity Tolerance  Endurance: Decreased tolerance for upright activites     Strength  Strength Comments: BLE 4-/5    Dynamic Sitting Balance  Dynamic Sitting-Comments: fair  Dynamic Standing Balance  Dynamic Standing-Comments: fair -    Functional Assessments:     Bed Mobility  Bed Mobility: Yes  Bed Mobility 1  Bed Mobility 1: Sitting to supine  Level of Assistance 1: Minimum assistance  Bed Mobility Comments 1: min A to lay down in bed  Transfers  Transfer: Yes  Transfer 1  Technique 1: Sit to stand, Stand to sit  Transfer Device 1: Walker (FWW)  Transfer Level of Assistance 1: Minimum assistance, Minimal verbal cues  Trials/Comments 1: min A with FWW cues for 50% wt bearing R LE  Ambulation/Gait Training  Ambulation/Gait Training Performed: Yes  Ambulation/Gait Training 1  Surface 1: Level tile  Device 1: Rolling walker  Assistance 1: Minimum assistance  Quality of Gait 1: Inconsistent stride length, Decreased step length (step to gait . 50 % wt bearing R LE)  Comments/Distance (ft) 1: cues to stay close to walker 30 ft with MIN A .    Extremity/Trunk Assessments:        RLE   RLE : Within Functional Limits  LLE   LLE : Within Functional Limits    Outcome Measures:  [facility] Basic Mobility  Turning from your back to your side while in a flat bed without using bedrails: A little  Moving from lying on your back to sitting on the side of a flat bed without using bedrails: A little  Moving to and from bed to chair (including a wheelchair): A little  Standing up from a chair using your arms (e.g. wheelchair or bedside chair): A little  To walk in hospital room: A little  Climbing 3-5 steps with railing: Total  Basic Mobility - Total Score: 16    Goals:  Encounter Problems       Encounter Problems (Active)       PT Problem       Pt will demonstrate SBA with bed mobility to edge of bed.         Start:  04/20/24    Expected End:  05/04/24            Pt will demonstrate SBA with  sit to stand/chair transfers with FWW.         Start:  04/20/24    Expected End:  05/04/24            Pt will ambulate 30 feet with FWW SBA.         Start:  04/20/24    Expected End:  05/04/24            Pt to demo improved BLE strength by being able to complete supine/seated thera ex 2x20 BLEs with 4 or less rest breaks .         Start:  04/20/24    Expected End:  05/04/24               Pain - Adult            Education Documentation  Mobility Training, taught by Kishore Oliveira, PT at 4/20/2024  2:17 PM.  Learner: Patient  Readiness: Acceptance  Method: Explanation  Response: Verbalizes Understanding    Education Comments  No comments found.

## 2024-04-20 NOTE — CARE PLAN
The patient's goals for the shift include      The clinical goals for the shift include Pt. will be hemodynamically stable throughout hospitalization    Problem: Skin  Goal: Decreased wound size/increased tissue granulation at next dressing change  Outcome: Progressing  Goal: Participates in plan/prevention/treatment measures  Outcome: Met  Goal: Prevent/manage excess moisture  Outcome: Met  Goal: Prevent/minimize sheer/friction injuries  Outcome: Met  Goal: Promote/optimize nutrition  Outcome: Met  Goal: Promote skin healing  Outcome: Met     Problem: Pain - Adult  Goal: Verbalizes/displays adequate comfort level or baseline comfort level  Outcome: Met     Problem: Safety - Adult  Goal: Free from fall injury  Outcome: Met     Problem: Discharge Planning  Goal: Discharge to home or other facility with appropriate resources  Outcome: Met     Problem: Chronic Conditions and Co-morbidities  Goal: Patient's chronic conditions and co-morbidity symptoms are monitored and maintained or improved  Outcome: Met

## 2024-04-20 NOTE — PROGRESS NOTES
Gracie Carlson is a 89 y.o. female on day 1 of admission presenting with Bright red blood per rectum.    Subjective   Patient doing well.  No further bleeding.  She said she had a level of bleeding when she wiped otherwise stable.         Objective     Physical Exam  Vitals reviewed.   Constitutional:       Appearance: Normal appearance.   Cardiovascular:      Rate and Rhythm: Normal rate and regular rhythm.      Pulses: Normal pulses.   Pulmonary:      Effort: Pulmonary effort is normal.      Breath sounds: Normal breath sounds.   Abdominal:      General: Abdomen is flat. Bowel sounds are normal. There is no distension.      Palpations: Abdomen is soft.      Tenderness: There is no abdominal tenderness.   Musculoskeletal:         General: Normal range of motion.   Neurological:      Mental Status: She is alert.         Last Recorded Vitals  Blood pressure 146/64, pulse 81, temperature 36.4 °C (97.5 °F), temperature source Temporal, resp. rate 17, height 1.524 m (5'), weight 50 kg (110 lb 3.7 oz), SpO2 93%.  Intake/Output last 3 Shifts:  I/O last 3 completed shifts:  In: 811.2 (16.2 mL/kg) [I.V.:22.5 (0.5 mL/kg); Blood:288.7; IV Piggyback:500]  Out: - (0 mL/kg)   Weight: 50 kg       Assessment/Plan   This is a 89-year-old woman with recent fall who was also recently on Xarelto presenting to the hospital with concern for lower GI bleeding and anemia.  Hemoglobin down to 6.8.  She is stable status posttransfusion.  Most likely cause is intermittent bleeding from the AVM in the GI tract exacerbated by anticoagulation.    For now recommend none endoscopic management of the GI bleeding.  Consider switching her from Xarelto to something like aspirin which would reduce the risk of bleeding providing adequate prophylaxis if okay with orthopedics team.  Continue on iron infusions.           Christian Carranza MD

## 2024-04-21 ENCOUNTER — APPOINTMENT (OUTPATIENT)
Dept: RADIOLOGY | Facility: HOSPITAL | Age: 89
DRG: 378 | End: 2024-04-21
Payer: MEDICARE

## 2024-04-21 LAB
ANION GAP SERPL CALC-SCNC: 11 MMOL/L (ref 10–20)
BLOOD EXPIRATION DATE: NORMAL
BUN SERPL-MCNC: 32 MG/DL (ref 6–23)
CALCIUM SERPL-MCNC: 7.8 MG/DL (ref 8.6–10.3)
CHLORIDE SERPL-SCNC: 107 MMOL/L (ref 98–107)
CO2 SERPL-SCNC: 23 MMOL/L (ref 21–32)
CREAT SERPL-MCNC: 0.71 MG/DL (ref 0.5–1.05)
DISPENSE STATUS: NORMAL
EGFRCR SERPLBLD CKD-EPI 2021: 78 ML/MIN/1.73M*2
ERYTHROCYTE [DISTWIDTH] IN BLOOD BY AUTOMATED COUNT: 15.1 % (ref 11.5–14.5)
GLUCOSE SERPL-MCNC: 101 MG/DL (ref 74–99)
HCT VFR BLD AUTO: 20.8 % (ref 36–46)
HCT VFR BLD AUTO: 25.1 % (ref 36–46)
HGB BLD-MCNC: 6.7 G/DL (ref 12–16)
HGB BLD-MCNC: 8.2 G/DL (ref 12–16)
HOLD SPECIMEN: NORMAL
HOLD SPECIMEN: NORMAL
MCH RBC QN AUTO: 28.3 PG (ref 26–34)
MCHC RBC AUTO-ENTMCNC: 32.2 G/DL (ref 32–36)
MCV RBC AUTO: 88 FL (ref 80–100)
NRBC BLD-RTO: 0 /100 WBCS (ref 0–0)
PLATELET # BLD AUTO: 177 X10*3/UL (ref 150–450)
POTASSIUM SERPL-SCNC: 3.9 MMOL/L (ref 3.5–5.3)
PRODUCT BLOOD TYPE: 600
PRODUCT CODE: NORMAL
RBC # BLD AUTO: 2.37 X10*6/UL (ref 4–5.2)
SODIUM SERPL-SCNC: 137 MMOL/L (ref 136–145)
UNIT ABO: NORMAL
UNIT NUMBER: NORMAL
UNIT RH: NORMAL
UNIT VOLUME: 283
WBC # BLD AUTO: 9.1 X10*3/UL (ref 4.4–11.3)
XM INTEP: NORMAL

## 2024-04-21 PROCEDURE — 80048 BASIC METABOLIC PNL TOTAL CA: CPT | Performed by: INTERNAL MEDICINE

## 2024-04-21 PROCEDURE — 2500000001 HC RX 250 WO HCPCS SELF ADMINISTERED DRUGS (ALT 637 FOR MEDICARE OP): Performed by: INTERNAL MEDICINE

## 2024-04-21 PROCEDURE — 1210000001 HC SEMI-PRIVATE ROOM DAILY

## 2024-04-21 PROCEDURE — 36430 TRANSFUSION BLD/BLD COMPNT: CPT

## 2024-04-21 PROCEDURE — 71045 X-RAY EXAM CHEST 1 VIEW: CPT

## 2024-04-21 PROCEDURE — 85027 COMPLETE CBC AUTOMATED: CPT | Performed by: INTERNAL MEDICINE

## 2024-04-21 PROCEDURE — 36415 COLL VENOUS BLD VENIPUNCTURE: CPT | Performed by: INTERNAL MEDICINE

## 2024-04-21 PROCEDURE — 99233 SBSQ HOSP IP/OBS HIGH 50: CPT | Performed by: PHYSICIAN ASSISTANT

## 2024-04-21 PROCEDURE — 2500000004 HC RX 250 GENERAL PHARMACY W/ HCPCS (ALT 636 FOR OP/ED)

## 2024-04-21 PROCEDURE — 85014 HEMATOCRIT: CPT | Performed by: INTERNAL MEDICINE

## 2024-04-21 PROCEDURE — P9016 RBC LEUKOCYTES REDUCED: HCPCS

## 2024-04-21 PROCEDURE — 71045 X-RAY EXAM CHEST 1 VIEW: CPT | Performed by: RADIOLOGY

## 2024-04-21 PROCEDURE — 99232 SBSQ HOSP IP/OBS MODERATE 35: CPT | Performed by: INTERNAL MEDICINE

## 2024-04-21 RX ORDER — IPRATROPIUM BROMIDE AND ALBUTEROL SULFATE 2.5; .5 MG/3ML; MG/3ML
3 SOLUTION RESPIRATORY (INHALATION) EVERY 4 HOURS PRN
Status: DISCONTINUED | OUTPATIENT
Start: 2024-04-21 | End: 2024-04-24 | Stop reason: HOSPADM

## 2024-04-21 RX ORDER — POLYETHYLENE GLYCOL 3350, SODIUM CHLORIDE, SODIUM BICARBONATE, POTASSIUM CHLORIDE 420; 11.2; 5.72; 1.48 G/4L; G/4L; G/4L; G/4L
4000 POWDER, FOR SOLUTION ORAL ONCE
Status: COMPLETED | OUTPATIENT
Start: 2024-04-22 | End: 2024-04-22

## 2024-04-21 RX ADMIN — TIOTROPIUM BROMIDE INHALATION SPRAY 2 PUFF: 3.12 SPRAY, METERED RESPIRATORY (INHALATION) at 06:30

## 2024-04-21 RX ADMIN — PANTOPRAZOLE SODIUM 40 MG: 40 TABLET, DELAYED RELEASE ORAL at 06:31

## 2024-04-21 RX ADMIN — ATORVASTATIN CALCIUM 20 MG: 20 TABLET, FILM COATED ORAL at 20:48

## 2024-04-21 RX ADMIN — IRON SUCROSE 200 MG: 20 INJECTION, SOLUTION INTRAVENOUS at 08:42

## 2024-04-21 RX ADMIN — LOSARTAN POTASSIUM 50 MG: 50 TABLET, FILM COATED ORAL at 20:47

## 2024-04-21 RX ADMIN — CYANOCOBALAMIN TAB 500 MCG 1000 MCG: 500 TAB at 08:42

## 2024-04-21 RX ADMIN — FLUTICASONE FUROATE AND VILANTEROL TRIFENATATE 1 PUFF: 100; 25 POWDER RESPIRATORY (INHALATION) at 06:31

## 2024-04-21 ASSESSMENT — COGNITIVE AND FUNCTIONAL STATUS - GENERAL
MOBILITY SCORE: 17
WALKING IN HOSPITAL ROOM: A LITTLE
DRESSING REGULAR LOWER BODY CLOTHING: A LITTLE
CLIMB 3 TO 5 STEPS WITH RAILING: TOTAL
STANDING UP FROM CHAIR USING ARMS: A LITTLE
TURNING FROM BACK TO SIDE WHILE IN FLAT BAD: A LITTLE
TOILETING: A LOT
PERSONAL GROOMING: A LITTLE
PERSONAL GROOMING: A LITTLE
MOVING TO AND FROM BED TO CHAIR: A LITTLE
DRESSING REGULAR LOWER BODY CLOTHING: A LITTLE
CLIMB 3 TO 5 STEPS WITH RAILING: A LOT
TOILETING: A LITTLE
DAILY ACTIVITIY SCORE: 18
HELP NEEDED FOR BATHING: A LITTLE
WALKING IN HOSPITAL ROOM: A LITTLE
MOVING TO AND FROM BED TO CHAIR: A LITTLE
DAILY ACTIVITIY SCORE: 19
DRESSING REGULAR UPPER BODY CLOTHING: A LITTLE
STANDING UP FROM CHAIR USING ARMS: A LITTLE
DRESSING REGULAR UPPER BODY CLOTHING: A LITTLE
MOBILITY SCORE: 19
HELP NEEDED FOR BATHING: A LITTLE

## 2024-04-21 ASSESSMENT — PAIN - FUNCTIONAL ASSESSMENT: PAIN_FUNCTIONAL_ASSESSMENT: 0-10

## 2024-04-21 ASSESSMENT — PAIN SCALES - GENERAL
PAINLEVEL_OUTOF10: 0 - NO PAIN
PAINLEVEL_OUTOF10: 0 - NO PAIN

## 2024-04-21 NOTE — PROGRESS NOTES
Department of Internal Medicine  Gastroenterology  Progress note      Subjective  GI is following for hematochezia and anemia    Today, patient denies any abdominal pain, nausea, or vomiting.  She had bowel movement yesterday but is unsure what it looks like.  Neighbor at bedside and with permission discussed at length the scopic evaluation.    Son at bedside later in the day.  After discussing with family patient is more agreeable to pursue colonoscopy.  Reviewed risks and benefits.    Discussed with floor nurse Agustín, bowel movement 4/20/2024 was liquid dark brown.  He did not believe it was actually black  Current Medication    Current Facility-Administered Medications:     acetaminophen (Tylenol) tablet 650 mg, 650 mg, oral, q4h PRN, Samir Gama MD    atorvastatin (Lipitor) tablet 20 mg, 20 mg, oral, Nightly, Samir Gama MD, 20 mg at 04/20/24 2119    cyanocobalamin (Vitamin B-12) tablet 1,000 mcg, 1,000 mcg, oral, Daily, Samir Gama MD, 1,000 mcg at 04/20/24 0935    fluticasone furoate-vilanteroL (Breo Ellipta) 100-25 mcg/dose inhaler 1 puff, 1 puff, inhalation, Daily, Samir Gama MD, 1 puff at 04/21/24 0631    HYDROcodone-acetaminophen (Norco) 5-325 mg per tablet 1 tablet, 1 tablet, oral, q6h PRN, Samir Gama MD    iron sucrose (Venofer) injection 200 mg, 200 mg, intravenous, Daily, Angy Jin, PharmD, 200 mg at 04/20/24 1011    losartan (Cozaar) tablet 50 mg, 50 mg, oral, Nightly, Samir Gama MD, 50 mg at 04/20/24 2119    ondansetron (Zofran) tablet 4 mg, 4 mg, oral, q8h PRN **OR** ondansetron (Zofran) injection 4 mg, 4 mg, intravenous, q8h PRN, Samir Gama MD    pantoprazole (ProtoNix) EC tablet 40 mg, 40 mg, oral, Daily before breakfast, Samir Gama MD, 40 mg at 04/21/24 0631    tiotropium (Spiriva Respimat) 2.5 mcg/actuation inhaler 2 puff, 2 puff, inhalation, Daily, Samir Gama MD, 2 puff at 04/21/24 0630    Past Medical History  Active Ambulatory Problems     Diagnosis  Date Noted    Aortic valve stenosis, severe 10/16/2023    Asthma (HHS-HCC) 10/16/2023    Balance problem 10/16/2023    Benign essential HTN 10/16/2023    CAD (coronary artery disease) 10/16/2023    Chronic diastolic congestive heart failure (Multi) 10/16/2023    Chronic obstructive pulmonary disease (Multi) 10/16/2023    Dysphonia 10/16/2023    Eye problem 10/16/2023    Generalized arthritis 10/16/2023    Heart disease 10/16/2023    Hypothyroidism 10/16/2023    Mitral valve stenosis 10/16/2023    Mixed hyperlipidemia 10/16/2023    Periprosth fracture around unsp internal prosth joint, subs 10/16/2023    Localized, primary osteoarthritis of shoulder region 10/16/2023    Rotator cuff tear arthropathy of right shoulder 10/16/2023    Primary osteoarthritis of right shoulder 10/16/2023    Pulmonary hypertension (Multi) 10/16/2023    S/P TAVR (transcatheter aortic valve replacement) 10/16/2023    Weakness of both hips 10/16/2023    Former smoker 10/16/2023    Fall, initial encounter 04/16/2024    [place]-prosthetic fracture around prosthetic hip 04/19/2024    Impaired mobility and ADLs 04/19/2024    Pain 04/19/2024    Slow transit constipation 04/19/2024     Resolved Ambulatory Problems     Diagnosis Date Noted    No Resolved Ambulatory Problems     Past Medical History:   Diagnosis Date    Hypothyroidism, unspecified 01/15/2016    Personal history of diseases of the blood and blood-forming organs and certain disorders involving the immune mechanism 11/13/2015    Personal history of other diseases of the circulatory system 01/15/2016    Personal history of other diseases of the circulatory system 01/15/2016    Personal history of other diseases of the musculoskeletal system and connective tissue 11/13/2015    Pulmonary hypertension, unspecified (Multi) 01/15/2016       PHYSICAL EXAM  VS: /78   Pulse (!) 113   Temp 37.3 °C (99.1 °F)   Resp 19   Ht 1.524 m (5')   Wt 50 kg (110 lb 3.7 oz)   SpO2 96%   BMI 21.53  kg/m²  Body mass index is 21.53 kg/m².  Physical Exam  Constitutional:       General: She is not in acute distress.     Appearance: Normal appearance.   HENT:      Mouth/Throat:      Mouth: Mucous membranes are moist.      Pharynx: Oropharynx is clear.   Eyes:      General: No scleral icterus.     Extraocular Movements: Extraocular movements intact.      Conjunctiva/sclera: Conjunctivae normal.      Pupils: Pupils are equal, round, and reactive to light.   Cardiovascular:      Rate and Rhythm: Normal rate and regular rhythm.      Heart sounds: No murmur heard.  Pulmonary:      Effort: Pulmonary effort is normal.      Breath sounds: No wheezing or rhonchi.   Abdominal:      General: Bowel sounds are normal. There is no distension.      Palpations: Abdomen is soft. There is no mass.      Tenderness: There is no abdominal tenderness.      Hernia: No hernia is present.   Musculoskeletal:         General: No swelling or deformity.   Skin:     General: Skin is warm.      Coloration: Skin is not jaundiced.   Neurological:      General: No focal deficit present.      Mental Status: She is alert. Mental status is at baseline.   Psychiatric:         Mood and Affect: Mood normal.          DATA  Recent blood work was reviewed and discussed with the patient and family  Results from last 7 days   Lab Units 04/21/24  0652   WBC AUTO x10*3/uL 9.1   RBC AUTO x10*6/uL 2.37*   HEMOGLOBIN g/dL 6.7*   HEMATOCRIT % 20.8*   MCV fL 88   MCHC g/dL 32.2   RDW % 15.1*   PLATELETS AUTO x10*3/uL 177       Results from last 72 hours   Lab Units 04/21/24  0652 04/20/24  0553 04/19/24  0954   SODIUM mmol/L 137   < > 133*   POTASSIUM mmol/L 3.9   < > 4.4   CHLORIDE mmol/L 107   < > 102   CO2 mmol/L 23   < > 22   BUN mg/dL 32*   < > 58*   CREATININE mg/dL 0.71   < > 1.50*   CALCIUM mg/dL 7.8*   < > 7.9*   PROTEIN TOTAL g/dL  --   --  5.1*   BILIRUBIN TOTAL mg/dL  --   --  0.5   ALK PHOS U/L  --   --  44   AST U/L  --   --  17   ALT U/L  --   --  11     < > = values in this interval not displayed.       Results from last 72 hours   Lab Units 04/19/24  0954   INR  1.1             RADIOLOGY REVIEW  NA    ENDOSCOPIC REVIEW  NA    IMPRESSION/RECOMMENDATIONS  Gracie Carlson is a 89 y.o. female with a PMH of osteoporosis, recent fall with right periprosthetic hip fracture (nonsurgical), hypothyroidism, HTN, HLD, mild COPD, aortic valve stenosis, TAVR, CAD, CHF presents to the emergency department of [facility] from skilled nursing facility with concern for painless rectal bleeding described as bright red blood per rectum and Hgb of 6.8.  Hemoglobin baseline 10.  Iron 20, TIBC 334, iron saturation 6%.  Ferritin 37.       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  Patient is agreeable as well as family.  Everybody understands the risks and benefits of pursuing endoscopic evaluation.  -Goals of care discussed with the patient.  Her primary goal is to be comfortable and that she is not afraid to die.  -Trend H&H and transfuse PRBC for Hgb <7.0  -Recommend iron infusions  -Recommend primary team weigh risk versus benefits of continuing Xarelto due to risk of bleeding.  Baby aspirin okay.  Likely Xarelto aggravated angioectasia in the colon  -Currently on a general diet will place on clear liquids 4/22/24.  Will start prep early in the day as patient has plenty of time to clear out  -Continue supportive care per primary    Discussed with Dr. Agrawal, GI to follow      (Electronically signed byJacqueline Arriaga PA-C on 4/21/2024 at 8:41 AM)

## 2024-04-21 NOTE — CARE PLAN
The patient's goals for the shift include      The clinical goals for the shift include Pt. will be free of falls by end of shift    Problem: Skin  Goal: Decreased wound size/increased tissue granulation at next dressing change  Outcome: Progressing     Problem: Pain  Goal: My pain/discomfort is manageable  Outcome: Met     Problem: Safety  Goal: Patient will be injury free during hospitalization  Outcome: Met  Goal: I will remain free of falls  Outcome: Met     Problem: Daily Care  Goal: Daily care needs are met  Outcome: Met     Problem: Psychosocial Needs  Goal: Demonstrates ability to cope with hospitalization/illness  Outcome: Progressing  Goal: Collaborate with me, my family, and caregiver to identify my specific goals  Outcome: Met     Problem: Discharge Barriers  Goal: My discharge needs are met  Outcome: Progressing

## 2024-04-21 NOTE — CARE PLAN
Problem: Skin  Goal: Decreased wound size/increased tissue granulation at next dressing change  Flowsheets (Taken 4/20/2024 7705)  Decreased wound size/increased tissue granulation at next dressing change: Protective dressings over bony prominences   The patient's goals for the shift include      The clinical goals for the shift include patient will remain safe fromfalls during this shift    Over the shift, the patient did make progress toward the following goals.

## 2024-04-21 NOTE — PROGRESS NOTES
ASSESSMENT & PLAN:     Painless hematochezia  Acute on chronic iron deficiency anemia due to blood loss  -had hematochezia at rehab center, Hgb was 6.3 (was 7-10 in April, 10-11.5 in Jan)  -was new start xarelto for vte ppx after recent periprosthetic hip fx, and was taking asa 81  -per GI review, she has known hx of colonic avm, new start ppx dose AC might have caused this to bleed  -iron studies c/w CHIQUIS, getting prbc and venofer here  -s/p 1u pRBC this admission  Plan:  -gi consulted, plan for conservative mgmt without colonoscopy for now as she is stable, and would be tough for her to tolerate bowel prep. Pt and family would like to hold off for now also.   -cont to hold antiplatelet, ac agents  -Hgb 6.7 this AM, no further overt bleeding, likely equilibrating now, will give another 1u pRBC now, repeat Hgb in PM     FRANCES - Scr 1.50 on admission, baseline normal, likely prerenal in s/o bleeding, now resolved with ivf and blood  B12 deficiency - B12 level here 187, cont PO supplementation  HTN - home losartan  CAD s/p remote PCI, DLD - hold ASA 81, cont statin  Chronic HFpEF - appears euvolemic, cont home losartan  COPD - not in acute exacerbation, cont home inhalers  R periprosthetic hip fx - pt/ot, presumably dispo is back to ar once bleeding resolved, keep outpt fu with ortho, pain control prn. Consulted ortho re vte ppx recs going forward as she did not tolerate Xarelto, however spoke with them informally, they said they have nothing to recommend and would like consult removed. I will stop Xarelto on discharge as risks outweigh benefits given admission for symptomatic lgib requiring blood transfusion, discussed with pt and daughter. Will likely do ASA 81 BID x28d, then resume once daily. Ideally would've liked ortho recs on this but they asked for consult to be removed.   Vte ppx scds  Dnr/dni, okay with icu, discussed with pt    Samir Gama MD    SUBJECTIVE     Had some confusion overnight. Resolved today.  No further overt bleeding. Hgb 6.7 today.     OBJECTIVE:       Last Recorded Vitals:  Vitals:    04/20/24 2310 04/21/24 0302 04/21/24 0303 04/21/24 0806   BP: 151/63 138/63 138/63 139/78   BP Location: Left arm Right arm     Patient Position: Lying Lying     Pulse: 97 95 95 (!) 113   Resp: 16 16 19   Temp: 36.7 °C (98.1 °F) 36.4 °C (97.5 °F) 36.4 °C (97.5 °F) 37.3 °C (99.1 °F)   TempSrc: Temporal Temporal     SpO2: 93% 96% 96% 96%   Weight:       Height:           Last I/O:  I/O last 3 completed shifts:  In: 1051.3 (21 mL/kg) [I.V.:1051.3 (21 mL/kg)]  Out: - (0 mL/kg)   Weight: 50 kg     Physical Exam:  GEN: appears stated age, NAD  CV:  no LE edema  LUNGS: CTAB, no w/r/c, no resp distress, on ra  ABD: soft, NT  SKIN: scattered bruising  MSK; no gross deformities, normal joints  NEURO: A+Ox3, no FND  PSYCH: appropriate mood, affect    Inpatient Medications:  atorvastatin, 20 mg, oral, Nightly  cyanocobalamin, 1,000 mcg, oral, Daily  fluticasone furoate-vilanteroL, 1 puff, inhalation, Daily  iron sucrose, 200 mg, intravenous, Daily  losartan, 50 mg, oral, Nightly  pantoprazole, 40 mg, oral, Daily before breakfast  tiotropium, 2 puff, inhalation, Daily        PRN Medications  PRN medications: acetaminophen, HYDROcodone-acetaminophen, ondansetron **OR** ondansetron    Continuous Medications:         LABS AND IMAGING:     Labs:  Results for orders placed or performed during the hospital encounter of 04/19/24 (from the past 24 hour(s))   Hemoglobin and hematocrit, blood   Result Value Ref Range    Hemoglobin 7.6 (L) 12.0 - 16.0 g/dL    Hematocrit 23.5 (L) 36.0 - 46.0 %   Basic Metabolic Panel   Result Value Ref Range    Glucose 101 (H) 74 - 99 mg/dL    Sodium 137 136 - 145 mmol/L    Potassium 3.9 3.5 - 5.3 mmol/L    Chloride 107 98 - 107 mmol/L    Bicarbonate 23 21 - 32 mmol/L    Anion Gap 11 10 - 20 mmol/L    Urea Nitrogen 32 (H) 6 - 23 mg/dL    Creatinine 0.71 0.50 - 1.05 mg/dL    eGFR 78 >60 mL/min/1.73m*2    Calcium  7.8 (L) 8.6 - 10.3 mg/dL   CBC   Result Value Ref Range    WBC 9.1 4.4 - 11.3 x10*3/uL    nRBC 0.0 0.0 - 0.0 /100 WBCs    RBC 2.37 (L) 4.00 - 5.20 x10*6/uL    Hemoglobin 6.7 (L) 12.0 - 16.0 g/dL    Hematocrit 20.8 (L) 36.0 - 46.0 %    MCV 88 80 - 100 fL    MCH 28.3 26.0 - 34.0 pg    MCHC 32.2 32.0 - 36.0 g/dL    RDW 15.1 (H) 11.5 - 14.5 %    Platelets 177 150 - 450 x10*3/uL   Prepare RBC: 1 Units   Result Value Ref Range    PRODUCT CODE N3774I44     Unit Number [id number]     Unit ABO A     Unit RH NEG     XM INTEP COMP     Dispense Status XM     Blood Expiration Date May 02, 2024 23:59 EDT     PRODUCT BLOOD TYPE 0600     UNIT VOLUME 283         Imaging:  ECG 12 lead  Unusual P axis, possible ectopic atrial rhythm  Minimal voltage criteria for LVH, may be normal variant  Abnormal ECG  When compared with ECG of 19-APR-2024 09:46, (unconfirmed)  Ectopic atrial rhythm has replaced Sinus rhythm  See ED provider note for full interpretation and clinical correlation  Confirmed by Carrie Graves ([id number]) on 4/19/2024 6:19:15 PM  XR chest 1 view  Narrative: STUDY:  Chest Radiograph; 04/19/2024 10:44 AM  INDICATION:  Shortness of breath.  COMPARISON:  XR chest 11/14/2022  ACCESSION NUMBER(S):  [id number]  ORDERING CLINICIAN:  JOSEPH LAMAS  TECHNIQUE:  Frontal chest was obtained at 10:44:00 hours.  FINDINGS:  No acute opacities or effusions are visualized.  Heart size is  enlarged.  There is no evidence of pneumothorax.  An aortic valve  prosthesis is present.  Impression: 1.  Cardiomegaly.  Signed by Evaristo Yarbrough MD

## 2024-04-22 LAB
ANION GAP SERPL CALC-SCNC: 10 MMOL/L (ref 10–20)
BUN SERPL-MCNC: 26 MG/DL (ref 6–23)
CALCIUM SERPL-MCNC: 8 MG/DL (ref 8.6–10.3)
CHLORIDE SERPL-SCNC: 106 MMOL/L (ref 98–107)
CO2 SERPL-SCNC: 24 MMOL/L (ref 21–32)
CREAT SERPL-MCNC: 0.72 MG/DL (ref 0.5–1.05)
EGFRCR SERPLBLD CKD-EPI 2021: 77 ML/MIN/1.73M*2
ERYTHROCYTE [DISTWIDTH] IN BLOOD BY AUTOMATED COUNT: 15.6 % (ref 11.5–14.5)
GLUCOSE SERPL-MCNC: 96 MG/DL (ref 74–99)
HCT VFR BLD AUTO: 23.9 % (ref 36–46)
HCT VFR BLD AUTO: 29.1 % (ref 36–46)
HGB BLD-MCNC: 7.8 G/DL (ref 12–16)
HGB BLD-MCNC: 9.4 G/DL (ref 12–16)
HOLD SPECIMEN: NORMAL
MAGNESIUM SERPL-MCNC: 1.91 MG/DL (ref 1.6–2.4)
MCH RBC QN AUTO: 28.2 PG (ref 26–34)
MCHC RBC AUTO-ENTMCNC: 32.6 G/DL (ref 32–36)
MCV RBC AUTO: 86 FL (ref 80–100)
NRBC BLD-RTO: 0 /100 WBCS (ref 0–0)
PLATELET # BLD AUTO: 197 X10*3/UL (ref 150–450)
POTASSIUM SERPL-SCNC: 4 MMOL/L (ref 3.5–5.3)
RBC # BLD AUTO: 2.77 X10*6/UL (ref 4–5.2)
SODIUM SERPL-SCNC: 136 MMOL/L (ref 136–145)
WBC # BLD AUTO: 8.7 X10*3/UL (ref 4.4–11.3)

## 2024-04-22 PROCEDURE — 1210000001 HC SEMI-PRIVATE ROOM DAILY

## 2024-04-22 PROCEDURE — 80048 BASIC METABOLIC PNL TOTAL CA: CPT | Performed by: INTERNAL MEDICINE

## 2024-04-22 PROCEDURE — 2500000001 HC RX 250 WO HCPCS SELF ADMINISTERED DRUGS (ALT 637 FOR MEDICARE OP): Performed by: INTERNAL MEDICINE

## 2024-04-22 PROCEDURE — 97535 SELF CARE MNGMENT TRAINING: CPT | Mod: GO,CO

## 2024-04-22 PROCEDURE — 85014 HEMATOCRIT: CPT | Performed by: INTERNAL MEDICINE

## 2024-04-22 PROCEDURE — 99232 SBSQ HOSP IP/OBS MODERATE 35: CPT | Performed by: INTERNAL MEDICINE

## 2024-04-22 PROCEDURE — 97116 GAIT TRAINING THERAPY: CPT | Mod: GP,CQ

## 2024-04-22 PROCEDURE — 85027 COMPLETE CBC AUTOMATED: CPT | Performed by: INTERNAL MEDICINE

## 2024-04-22 PROCEDURE — 2500000001 HC RX 250 WO HCPCS SELF ADMINISTERED DRUGS (ALT 637 FOR MEDICARE OP): Performed by: PHYSICIAN ASSISTANT

## 2024-04-22 PROCEDURE — 36415 COLL VENOUS BLD VENIPUNCTURE: CPT | Performed by: INTERNAL MEDICINE

## 2024-04-22 PROCEDURE — 83735 ASSAY OF MAGNESIUM: CPT | Performed by: INTERNAL MEDICINE

## 2024-04-22 RX ADMIN — LOSARTAN POTASSIUM 50 MG: 50 TABLET, FILM COATED ORAL at 21:36

## 2024-04-22 RX ADMIN — ATORVASTATIN CALCIUM 20 MG: 20 TABLET, FILM COATED ORAL at 21:36

## 2024-04-22 RX ADMIN — PANTOPRAZOLE SODIUM 40 MG: 40 TABLET, DELAYED RELEASE ORAL at 06:24

## 2024-04-22 RX ADMIN — FLUTICASONE FUROATE AND VILANTEROL TRIFENATATE 1 PUFF: 100; 25 POWDER RESPIRATORY (INHALATION) at 06:24

## 2024-04-22 RX ADMIN — TIOTROPIUM BROMIDE INHALATION SPRAY 2 PUFF: 3.12 SPRAY, METERED RESPIRATORY (INHALATION) at 06:24

## 2024-04-22 RX ADMIN — POLYETHYLENE GLYCOL 3350, SODIUM CHLORIDE, SODIUM BICARBONATE AND POTASSIUM CHLORIDE WITH LEMON FLAVOR 4000 ML: 420; 11.2; 5.72; 1.48 POWDER, FOR SOLUTION ORAL at 13:07

## 2024-04-22 RX ADMIN — CYANOCOBALAMIN TAB 500 MCG 1000 MCG: 500 TAB at 08:58

## 2024-04-22 ASSESSMENT — COGNITIVE AND FUNCTIONAL STATUS - GENERAL
TOILETING: A LOT
STANDING UP FROM CHAIR USING ARMS: A LITTLE
PERSONAL GROOMING: A LOT
WALKING IN HOSPITAL ROOM: A LITTLE
WALKING IN HOSPITAL ROOM: A LITTLE
TURNING FROM BACK TO SIDE WHILE IN FLAT BAD: A LITTLE
MOVING TO AND FROM BED TO CHAIR: A LITTLE
DRESSING REGULAR LOWER BODY CLOTHING: A LOT
CLIMB 3 TO 5 STEPS WITH RAILING: TOTAL
MOBILITY SCORE: 19
MOBILITY SCORE: 16
DAILY ACTIVITIY SCORE: 15
MOVING FROM LYING ON BACK TO SITTING ON SIDE OF FLAT BED WITH BEDRAILS: A LITTLE
STANDING UP FROM CHAIR USING ARMS: A LITTLE
DRESSING REGULAR UPPER BODY CLOTHING: A LITTLE
HELP NEEDED FOR BATHING: A LOT
MOVING TO AND FROM BED TO CHAIR: A LITTLE
CLIMB 3 TO 5 STEPS WITH RAILING: A LOT

## 2024-04-22 ASSESSMENT — PAIN SCALES - GENERAL
PAINLEVEL_OUTOF10: 0 - NO PAIN

## 2024-04-22 ASSESSMENT — PAIN - FUNCTIONAL ASSESSMENT
PAIN_FUNCTIONAL_ASSESSMENT: 0-10
PAIN_FUNCTIONAL_ASSESSMENT: 0-10

## 2024-04-22 ASSESSMENT — ACTIVITIES OF DAILY LIVING (ADL): HOME_MANAGEMENT_TIME_ENTRY: 10

## 2024-04-22 NOTE — PROGRESS NOTES
04/22/24 1004   Discharge Planning   Living Arrangements Alone   Support Systems Family members   Type of Residence Inpatient rehab facility  (pt dc to [facility] acute rehab on 4/17 -readmitted for rectal bleeding)   Who is requesting discharge planning? Provider   Patient expects to be discharged to: return [facility] re[facility]   Does the patient need discharge transport arranged? Yes   RoundTrip coordination needed? Yes   Patient Choice   Provider Choice list and CMS website (https://medicare.gov/care-compare#search) for post-acute Quality and Resource Measure Data were provided and reviewed with: Patient;Family   Patient / Family choosing to utilize agency / facility established prior to hospitalization Yes     Pt seen by therapy [facility] PT 16 OT 16. Met with pt to discuss discharge planning. Pt agreeable to return to [facility] . Referral sent to [facility].

## 2024-04-22 NOTE — PROGRESS NOTES
Department of Internal Medicine  Gastroenterology  Progress note      Subjective  GI is following for hematochezia and anemia    Patient denies abdominal pain or nausea vomiting.  Patient, daughter-in-law and bedside RN unsure when last bowel movement was.  However, bedside RN reports no bowel movement overnight or today thus far.    Discussed with patient, family at bedside RN to start bowel prep today with plan for colonoscopy tomorrow.      Current Medication    Current Facility-Administered Medications:     acetaminophen (Tylenol) tablet 650 mg, 650 mg, oral, q4h PRN, Samir Gama MD    atorvastatin (Lipitor) tablet 20 mg, 20 mg, oral, Nightly, Samir Gama MD, 20 mg at 04/21/24 2048    cyanocobalamin (Vitamin B-12) tablet 1,000 mcg, 1,000 mcg, oral, Daily, Samir Gama MD, 1,000 mcg at 04/22/24 0858    fluticasone furoate-vilanteroL (Breo Ellipta) 100-25 mcg/dose inhaler 1 puff, 1 puff, inhalation, Daily, Samir Gama MD, 1 puff at 04/22/24 0624    HYDROcodone-acetaminophen (Norco) 5-325 mg per tablet 1 tablet, 1 tablet, oral, q6h PRN, Samir Gama MD    ipratropium-albuteroL (Duo-Neb) 0.5-2.5 mg/3 mL nebulizer solution 3 mL, 3 mL, nebulization, q4h PRN, Samir Gama MD    losartan (Cozaar) tablet 50 mg, 50 mg, oral, Nightly, Samir Gama MD, 50 mg at 04/21/24 2047    ondansetron (Zofran) tablet 4 mg, 4 mg, oral, q8h PRN **OR** ondansetron (Zofran) injection 4 mg, 4 mg, intravenous, q8h PRN, Samir Gama MD    pantoprazole (ProtoNix) EC tablet 40 mg, 40 mg, oral, Daily before breakfast, Samir Gama MD, 40 mg at 04/22/24 0624    tiotropium (Spiriva Respimat) 2.5 mcg/actuation inhaler 2 puff, 2 puff, inhalation, Daily, Samir Gama MD, 2 puff at 04/22/24 0624    Past Medical History  Active Ambulatory Problems     Diagnosis Date Noted    Aortic valve stenosis, severe 10/16/2023    Asthma ([facility]) 10/16/2023    Balance problem 10/16/2023    Benign essential HTN 10/16/2023    CAD (coronary  artery disease) 10/16/2023    Chronic diastolic congestive heart failure (Multi) 10/16/2023    Chronic obstructive pulmonary disease (Multi) 10/16/2023    Dysphonia 10/16/2023    Eye problem 10/16/2023    Generalized arthritis 10/16/2023    Heart disease 10/16/2023    Hypothyroidism 10/16/2023    Mitral valve stenosis 10/16/2023    Mixed hyperlipidemia 10/16/2023    Periprosth fracture around unsp internal prosth joint, subs 10/16/2023    Localized, primary osteoarthritis of shoulder region 10/16/2023    Rotator cuff tear arthropathy of right shoulder 10/16/2023    Primary osteoarthritis of right shoulder 10/16/2023    Pulmonary hypertension (Multi) 10/16/2023    S/P TAVR (transcatheter aortic valve replacement) 10/16/2023    Weakness of both hips 10/16/2023    Former smoker 10/16/2023    Fall, initial encounter 04/16/2024    [place]-prosthetic fracture around prosthetic hip 04/19/2024    Impaired mobility and ADLs 04/19/2024    Pain 04/19/2024    Slow transit constipation 04/19/2024     Resolved Ambulatory Problems     Diagnosis Date Noted    No Resolved Ambulatory Problems     Past Medical History:   Diagnosis Date    Hypothyroidism, unspecified 01/15/2016    Personal history of diseases of the blood and blood-forming organs and certain disorders involving the immune mechanism 11/13/2015    Personal history of other diseases of the circulatory system 01/15/2016    Personal history of other diseases of the circulatory system 01/15/2016    Personal history of other diseases of the musculoskeletal system and connective tissue 11/13/2015    Pulmonary hypertension, unspecified (Multi) 01/15/2016       PHYSICAL EXAM  VS: /70   Pulse 102   Temp 37.2 °C (99 °F) (Temporal)   Resp 18   Ht 1.524 m (5')   Wt 50 kg (110 lb 3.7 oz)   SpO2 93%   BMI 21.53 kg/m²  Body mass index is 21.53 kg/m².  Physical Exam  Alert, no acute distress, nontender abdomen, nonlabored breathing  DATA  Recent blood work and relevant  radiology and endoscopic studies were reviewed and discussed with the patient   Results from last 7 days   Lab Units 04/22/24  0638   WBC AUTO x10*3/uL 8.7   RBC AUTO x10*6/uL 2.77*   HEMOGLOBIN g/dL 7.8*   HEMATOCRIT % 23.9*   MCV fL 86   MCHC g/dL 32.6   RDW % 15.6*   PLATELETS AUTO x10*3/uL 197       Results from last 72 hours   Lab Units 04/22/24  0637   SODIUM mmol/L 136   POTASSIUM mmol/L 4.0   CHLORIDE mmol/L 106   CO2 mmol/L 24   BUN mg/dL 26*   CREATININE mg/dL 0.72   CALCIUM mg/dL 8.0*                       IMPRESSION/RECOMMENDATIONS  IMPRESSION/RECOMMENDATIONS  Gracie Carlson is a 89 y.o. female with a PMH of osteoporosis, recent fall with right periprosthetic hip fracture (nonsurgical), hypothyroidism, HTN, HLD, mild COPD, aortic valve stenosis, TAVR, CAD, CHF presents to the emergency department of [facility] from skilled nursing facility with concern for painless rectal bleeding described as bright red blood per rectum and Hgb of 6.8.         Painless rectal bleeding DDx: Diverticular bleed, AVM, gastritis/PUD, malignancy  Acute on chronic anemia due to blood loss -CHIQUIS.  Hgb 6.7-> 7.8 s/p 2 units PRBCs. Hemoglobin baseline 10.  Iron 20, TIBC 334, iron saturation 6%.  Ferritin 37.    Remote GIB 2/2 AVM cecum on colonoscopy at [facility] (2014)  Occasional constipation     PLAN  -Given recurrent anemia we will plan for colonoscopy 4/23/2024.  Patient is agreeable as well as family.  Everybody understands the risks and benefits of pursuing endoscopic evaluation.  -Goals of care discussed with the patient.  Her primary goal is to be comfortable and that she is not afraid to die.  -Trend H&H and transfuse PRBC for Hgb <7.0  -Recommend iron infusions  -Recommend primary team weigh risk versus benefits of continuing Xarelto due to risk of bleeding.  Baby aspirin okay.  Likely Xarelto aggravated angioectasia in the colon  -clear liquids today.  Will start prep early in the day as patient has plenty of time to clear  out  -Continue supportive care per primary  -On PPI daily  -Bowel regimen as needed        Discussed with Dr. Agrawal, GI to follow  Total of 25 minutes spent on visit and overall professional care of the patient including patient/family education  (Electronically signed byWAQAS Alcantara on 4/22/2024 at 2:11 PM)

## 2024-04-22 NOTE — PROGRESS NOTES
"Physical Therapy    Physical Therapy Treatment    Patient Name: Gracie Carlson  MRN: [id number]  Today's Date: 4/22/2024  Time Calculation  Start Time: 1326  Stop Time: 1350  Time Calculation (min): 24 min       Assessment/Plan   End of Session Communication: Bedside nurse  End of Session Patient Position:  (Patient reclined in chair after treatment.  Call light and tray in reach.  Chair alarm in place)    PT Plan  Inpatient/Swing Bed or Outpatient: Inpatient  Treatment/Interventions: Bed mobility, Transfer training, Gait training, Stair training, Balance training, Endurance training, Strengthening, Therapeutic exercise, Therapeutic activity, Home exercise program  PT Plan: Skilled PT  PT Frequency: 3 times per week  PT Discharge Recommendations: Moderate intensity level of continued care  Equipment Recommended upon Discharge:  (FWW) PT Recommended Transfer Status: Assist x1, Assistive device    General Visit Information:   PT  Visit  PT Received On: 04/22/24    General  Co-Treatment:  (co-treatment performed with OT to maximize patient safety and function.  One unit billed for PT)  Prior to Session Communication:  (Cleared by RN for PT.  Patient just started bowel prep for colonoscopy)  Patient Position Received:  (Presents in bed, agreeable to PT with some encourgament)    General Comment:  (Patient admitted after noticing toilet full of blood following BM)    General Observations:   General Observation:  (alarm; IV)    Subjective  \"I don't know how I'm supposed to get up with all this stuff in me\"    Precautions:  Precautions  LE Weight Bearing Status:  (50%WB LLE)  Medical Precautions: Fall precautions  Precautions Comment:  (No active right hip abduction)    Pain:  Pain Assessment  Pain Assessment: 0-10  Pain Score: 0 - No pain    Cognition:  Cognition  Overall Cognitive Status:  (pleasant and cooperative, anxious about drinking her bowel prep)      Balance:  Static Standing-Level of Assistance:  " (F/F-)  Static Standing-Comment/Number of Minutes:  (4 1/2 minutes)      Activity Tolerance:  Activity Tolerance  Endurance:  (Activity tolerance limited by SOB and fatigue)       Bed Mobility  Bed Mobility:  (supine-->sit: SBAx1 with bed flat.)    Ambulation/Gait Training  Ambulation/Gait Training Performed:  (Patient amb 15' +30' with ww and CGAx1.  She maintains 50% WB on RLE.  Cueing needed to remain inside walkers ISABEL.  (+) MISTRY with exertion.  Unable to obtain Sp02 reading.)    Transfers  Transfer:  (sit-->stand: Two stands performed.  Standing from EOB: min x2.  Patient demos good hand placement and maintains RLE WB precautions.  Slightly retropulsive.  Standing from elevated toilet: mod x1   stand-->sit: min x1.  Cues to bring ww back all the way to chair and to extend RLE out when coming down to sit.           Outcome Measures:  [facility] Basic Mobility  Turning from your back to your side while in a flat bed without using bedrails: A little  Moving from lying on your back to sitting on the side of a flat bed without using bedrails: A little  Moving to and from bed to chair (including a wheelchair): A little  Standing up from a chair using your arms (e.g. wheelchair or bedside chair): A little  To walk in hospital room: A little  Climbing 3-5 steps with railing: Total  Basic Mobility - Total Score: 16    Education Documentation  Handouts, taught by Renate Coon PTA at 4/22/2024  2:36 PM.  Learner: Patient  Readiness: Acceptance  Method: Explanation  Response: Needs Reinforcement    Precautions, taught by Renate Coon PTA at 4/22/2024  2:36 PM.  Learner: Patient  Readiness: Acceptance  Method: Explanation  Response: Needs Reinforcement    Home Exercise Program, taught by Renate Coon PTA at 4/22/2024  2:36 PM.  Learner: Patient  Readiness: Acceptance  Method: Explanation  Response: Needs Reinforcement    Mobility Training, taught by Renate Coon PTA at 4/22/2024  2:36 PM.  Learner: Patient  Readiness:  Acceptance  Method: Explanation  Response: Needs Reinforcement    Education Comments  Individual(s) Educated: Patient  Education Provided:  (Patient able to verbalize RLE WB restrictions.  Safety with transfers and use of ww reinforced.)    Encounter Problems       Encounter Problems (Active)       PT Problem       Pt will demonstrate SBA with bed mobility to edge of bed.   (Progressing)       Start:  04/20/24    Expected End:  05/04/24            Pt will demonstrate SBA with sit to stand/chair transfers with FWW.   (Progressing)       Start:  04/20/24    Expected End:  05/04/24            Pt will ambulate 30 feet with FWW SBA.   (Progressing)       Start:  04/20/24    Expected End:  05/04/24            Pt to demo improved BLE strength by being able to complete supine/seated thera ex 2x20 BLEs with 4 or less rest breaks .   (Not Progressing)       Start:  04/20/24    Expected End:  05/04/24               Pain - Adult

## 2024-04-22 NOTE — PROGRESS NOTES
Occupational Therapy    OT Treatment    Patient Name: Gracie Carlson  MRN: [id number]  Today's Date: 4/22/2024  Time Calculation  Start Time: 1325  Stop Time: 1350  Time Calculation (min): 25 min       Assessment:  End of Session Communication: Bedside nurse  End of Session Patient Position: Up in chair, Alarm on     Plan:  Treatment Interventions: ADL retraining, Functional transfer training, Endurance training, UE strengthening/ROM, Patient/family training  OT Frequency: 2 times per week  Treatment Interventions: ADL retraining, Functional transfer training, Endurance training, UE strengthening/ROM, Patient/family training    Subjective   Previous Visit Info:  OT Last Visit  OT Received On: 04/22/24  General:  General  Co-Treatment: PT  Prior to Session Communication: Bedside nurse  Patient Position Received: Bed, 3 rail up, Alarm on  Precautions:  LE Weight Bearing Status: Right Partial Weight Bearing (50%)  Medical Precautions: Fall precautions  Precautions Comment: Per therapy evals on 4/17, no active R hip abduction     Pain:  Pain Assessment  Pain Score: 0 - No pain    Objective    Cognition:  Cognition  Overall Cognitive Status: Impaired  Orientation Level: Disoriented to time, Disoriented to situation  Following Commands: Follows multistep commands with repetition  Safety Judgment: Decreased awareness of need for safety precautions  Problem Solving: Assistance required to implement solutions  Insight: Moderate  Impulsive: Moderately     Activities of Daily Living: Grooming  Grooming Level of Assistance:  (hand hygiene while in stance at sink with SBA with fair balance)  Grooming Comments: patient requesting to perform oral care at start of session however once at sink, declined performance    LE Dressing  LE Dressing: Yes  Adult Briefs Level of Assistance:  (pants mgmt while in stance- min a)  LE Dressing Where Assessed: Toilet    Toileting  Toileting Level of Assistance:  (SBA seated with weight  shifting)  Where Assessed: Toilet  Functional Standing Tolerance:  Functional Standing Tolerance Comments: patient stood for a total of 4.5 mins this date with fair/fair- balance with 2-0 ue support as needed during functional ambulation/transfers and ADL tasks, able to maintain 50% WB RLE this date  Bed Mobility/Transfers: Bed Mobility 1  Bed Mobility 1: Supine to sitting  Level of Assistance 1:  (SBA with bed flat and no use of bed rails)    Transfers  Transfer: Yes  Transfer 1  Technique 1: Sit to stand  Transfer Device 1: Walker  Transfer Level of Assistance 1: Minimum assistance, +2, Moderate tactile cues, Moderate verbal cues  Transfers 2  Transfer Device 2: Walker  Transfer Level of Assistance 2: Contact guard  Trials/Comments 2: functional ambulation    Toilet Transfers  Toilet Transfer Type: To and from  Toilet Transfer to: Raised toilet seat without rails (with use of grab bars)  Toilet Transfer Technique: Ambulating  Toilet Transfers: Moderate assistance  Sitting Balance:  Dynamic Sitting Balance  Dynamic Sitting-Balance:  (fair+)  Standing Balance:  Dynamic Standing Balance  Dynamic Standing-Balance:  (fair/fair-)    Outcome Measures:[facility] Daily Activity  Putting on and taking off regular lower body clothing: A lot  Bathing (including washing, rinsing, drying): A lot  Putting on and taking off regular upper body clothing: A little  Toileting, which includes using toilet, bedpan or urinal: A lot  Taking care of personal grooming such as brushing teeth: A lot  Eating Meals: None  Daily Activity - Total Score: 15      Education Documentation  Body Mechanics, taught by SIDDHARTHA Bravo at 4/22/2024  3:37 PM.  Learner: Patient  Readiness: Acceptance  Method: Explanation  Response: Needs Reinforcement    Precautions, taught by SIDDHARTHA Bravo at 4/22/2024  3:37 PM.  Learner: Patient  Readiness: Acceptance  Method: Explanation  Response: Needs Reinforcement    ADL Training, taught by SIDDHARTHA Bravo  at 4/22/2024  3:37 PM.  Learner: Patient  Readiness: Acceptance  Method: Explanation  Response: Needs Reinforcement    OP EDUCATION:  Education  Individual(s) Educated: Patient  Education Provided: Diagnosis & Precautions, Symptom management, Ergonomics and postural realignment, Joint protection and energy conservation, Fall precautons, Risk and benefits of OT discussed with patient or other, POC discussed and agreed upon  Diagnosis and Precautions: 50% WB RLE no active ADBuction  Equipment:  (AE, EC tech, home DME)  Patient/Caregiver Demonstrated Understanding: yes  Plan of Care Discussed and Agreed Upon: yes  Patient Response to Education: Patient/Caregiver Verbalized Understanding of Information    Goals:  Encounter Problems       Encounter Problems (Active)       OT Goals       Pt will complete all functional transfers with MOD I. (Progressing)       Start:  04/20/24    Expected End:  05/04/24            Pt will complete LB dressing with MOD I. (Progressing)       Start:  04/20/24    Expected End:  05/04/24            Pt will improve dynamic standing balance to fair+ in prep for ADL tasks while adhering to WB px.  (Progressing)       Start:  04/20/24    Expected End:  05/04/24            Pt will tolerate 10 mins of functional activities with 2 or fewer rest breaks. (Progressing)       Start:  04/20/24    Expected End:  05/04/24            Pt will complete all functional mobility with MOD I in prep for ADL tasks. (Progressing)       Start:  04/20/24    Expected End:  05/04/24

## 2024-04-22 NOTE — PROGRESS NOTES
ASSESSMENT & PLAN:     Painless hematochezia  Acute on chronic iron deficiency anemia due to blood loss  -had hematochezia at rehab center, Hgb was 6.3 (was 7-10 in April, 10-11.5 in Jan)  -was new start xarelto for vte ppx after recent periprosthetic hip fx, and was taking asa 81  -per GI review, she has known hx of colonic avm, new start ppx dose AC might have caused this to bleed  -iron studies c/w CHIQUIS, getting prbc and venofer here  -s/p 2u pRBC this admission  Plan:  -gi consulted, initially was planned for conservative mgmt with no colonoscopy, however given continued Hgb drops, GI discussed with pt and now plan for colonoscopy. CLD today, prep tonight, c-scope tomorrow.   -cont to hold antiplatelet, ac agents  -Hgb 7.8 this AM, no further overt bleeding, rpt H&H in PM     FRANCES - Scr 1.50 on admission, baseline normal, was prerenal in s/o bleeding, now resolved with ivf and blood  B12 deficiency - B12 level here 187, cont PO supplementation  HTN - home losartan  CAD s/p remote PCI, DLD - hold ASA 81, cont statin  Chronic HFpEF - appears euvolemic, cont home losartan  COPD - not in acute exacerbation, cont home inhalers  R periprosthetic hip fx - pt/ot, presumably dispo is back to [facility] once bleeding resolved, keep outpt fu with ortho, pain control prn. Consulted ortho re vte ppx recs going forward as she did not tolerate Xarelto, however spoke with them informally, they said they have nothing to recommend and would like consult removed. I will stop Xarelto on discharge as risks outweigh benefits given admission for symptomatic lgib requiring blood transfusion, discussed with pt and daughter. Will likely do ASA 81 BID x28d, then resume once daily. Ideally would've liked ortho recs on this but they asked for consult to be removed.     Vte ppx scds  Dnr/dni, okay with icu, discussed with pt  Dispo: likely back to [place] when ready    MD VIDHI Taylor. S/p 1u prBC yest, Hgb came up. Denies  hematochezia in last couple days. Not confused this morning.     OBJECTIVE:       Last Recorded Vitals:  Vitals:    04/21/24 1833 04/21/24 1932 04/22/24 0020 04/22/24 0414   BP: 159/66 (!) 182/73 145/62 156/70   BP Location:       Patient Position:       Pulse: 92 93 103 102   Resp:  19 18 18   Temp:  37.1 °C (98.8 °F) 37.1 °C (98.8 °F) 37.2 °C (99 °F)   TempSrc:  Temporal Temporal Temporal   SpO2: 98% 92% 95% 93%   Weight:       Height:           Last I/O:  I/O last 3 completed shifts:  In: 283 (5.7 mL/kg) [Blood:283]  Out: - (0 mL/kg)   Weight: 50 kg     Physical Exam:  GEN: appears stated age, NAD  CV:  no LE edema  LUNGS: CTAB, no w/r/c, no resp distress, on ra  ABD: soft, NT  SKIN: scattered bruising  MSK; no gross deformities, normal joints  NEURO: A+Ox3, no FND  PSYCH: appropriate mood, affect    Inpatient Medications:  atorvastatin, 20 mg, oral, Nightly  cyanocobalamin, 1,000 mcg, oral, Daily  fluticasone furoate-vilanteroL, 1 puff, inhalation, Daily  losartan, 50 mg, oral, Nightly  pantoprazole, 40 mg, oral, Daily before breakfast  polyethylene glycol-electrolytes, 4,000 mL, oral, Once  tiotropium, 2 puff, inhalation, Daily        PRN Medications  PRN medications: acetaminophen, HYDROcodone-acetaminophen, ipratropium-albuteroL, ondansetron **OR** ondansetron    Continuous Medications:         LABS AND IMAGING:     Labs:  Results for orders placed or performed during the hospital encounter of 04/19/24 (from the past 24 hour(s))   Hemoglobin and hematocrit, blood   Result Value Ref Range    Hemoglobin 8.2 (L) 12.0 - 16.0 g/dL    Hematocrit 25.1 (L) 36.0 - 46.0 %   Magnesium   Result Value Ref Range    Magnesium 1.91 1.60 - 2.40 mg/dL   Basic Metabolic Panel   Result Value Ref Range    Glucose 96 74 - 99 mg/dL    Sodium 136 136 - 145 mmol/L    Potassium 4.0 3.5 - 5.3 mmol/L    Chloride 106 98 - 107 mmol/L    Bicarbonate 24 21 - 32 mmol/L    Anion Gap 10 10 - 20 mmol/L    Urea Nitrogen 26 (H) 6 - 23 mg/dL     Creatinine 0.72 0.50 - 1.05 mg/dL    eGFR 77 >60 mL/min/1.73m*2    Calcium 8.0 (L) 8.6 - 10.3 mg/dL   SST TOP   Result Value Ref Range    Extra Tube Hold for add-ons.    CBC   Result Value Ref Range    WBC 8.7 4.4 - 11.3 x10*3/uL    nRBC 0.0 0.0 - 0.0 /100 WBCs    RBC 2.77 (L) 4.00 - 5.20 x10*6/uL    Hemoglobin 7.8 (L) 12.0 - 16.0 g/dL    Hematocrit 23.9 (L) 36.0 - 46.0 %    MCV 86 80 - 100 fL    MCH 28.2 26.0 - 34.0 pg    MCHC 32.6 32.0 - 36.0 g/dL    RDW 15.6 (H) 11.5 - 14.5 %    Platelets 197 150 - 450 x10*3/uL        Imaging:  XR chest 1 view  Narrative: Interpreted By:  Madisyn Ng,   STUDY:  XR CHEST 1 VIEW;  4/21/2024 4:35 pm      INDICATION:  Signs/Symptoms:dyspnea, slightly more lethargic, s/p blood  transfusion, eval for any edema.      COMPARISON:  04/19/2024      ACCESSION NUMBER(S):  [id number]      ORDERING CLINICIAN:  PATRICIA MONTOYA      FINDINGS:  Patient is rotated. The heart may be normal in size. There are dense  calcifications of the mitral annulus.      Patient is status post aortic valve replacement.      There is no discrete consolidation or obvious pleural fluid. The left  heart border however is not well defined.      Patient is scoliotic.      COMPARISON OF FINDING:  The left heart border is less well defined. This may be due to  rotation.      Impression: Somewhat limited exam. It is difficult to exclude left basilar  infiltrate and/or effusion.      MACRO:  none      Signed by: Madisyn Ng 4/22/2024 7:51 AM  Dictation workstation:   [id number]

## 2024-04-23 ENCOUNTER — ANESTHESIA EVENT (OUTPATIENT)
Dept: GASTROENTEROLOGY | Facility: HOSPITAL | Age: 89
DRG: 378 | End: 2024-04-23
Payer: MEDICARE

## 2024-04-23 ENCOUNTER — APPOINTMENT (OUTPATIENT)
Dept: GASTROENTEROLOGY | Facility: HOSPITAL | Age: 89
DRG: 378 | End: 2024-04-23
Payer: MEDICARE

## 2024-04-23 ENCOUNTER — ANESTHESIA (OUTPATIENT)
Dept: GASTROENTEROLOGY | Facility: HOSPITAL | Age: 89
DRG: 378 | End: 2024-04-23
Payer: MEDICARE

## 2024-04-23 PROBLEM — I35.0 AORTIC VALVE STENOSIS, SEVERE: Status: RESOLVED | Noted: 2023-10-16 | Resolved: 2024-04-23

## 2024-04-23 LAB
ABO GROUP (TYPE) IN BLOOD: NORMAL
ALBUMIN SERPL BCP-MCNC: 3.1 G/DL (ref 3.4–5)
ALP SERPL-CCNC: 53 U/L (ref 33–136)
ALT SERPL W P-5'-P-CCNC: 15 U/L (ref 7–45)
ANION GAP SERPL CALC-SCNC: 14 MMOL/L (ref 10–20)
ANTIBODY SCREEN: NORMAL
AST SERPL W P-5'-P-CCNC: 20 U/L (ref 9–39)
BILIRUB SERPL-MCNC: 1.5 MG/DL (ref 0–1.2)
BUN SERPL-MCNC: 21 MG/DL (ref 6–23)
CALCIUM SERPL-MCNC: 8.3 MG/DL (ref 8.6–10.3)
CHLORIDE SERPL-SCNC: 104 MMOL/L (ref 98–107)
CO2 SERPL-SCNC: 21 MMOL/L (ref 21–32)
CREAT SERPL-MCNC: 0.77 MG/DL (ref 0.5–1.05)
EGFRCR SERPLBLD CKD-EPI 2021: 71 ML/MIN/1.73M*2
ERYTHROCYTE [DISTWIDTH] IN BLOOD BY AUTOMATED COUNT: 15.7 % (ref 11.5–14.5)
GLUCOSE SERPL-MCNC: 79 MG/DL (ref 74–99)
HCT VFR BLD AUTO: 24.3 % (ref 36–46)
HGB BLD-MCNC: 7.9 G/DL (ref 12–16)
HOLD SPECIMEN: NORMAL
INR PPP: 1 (ref 0.9–1.1)
MAGNESIUM SERPL-MCNC: 1.84 MG/DL (ref 1.6–2.4)
MCH RBC QN AUTO: 28.2 PG (ref 26–34)
MCHC RBC AUTO-ENTMCNC: 32.5 G/DL (ref 32–36)
MCV RBC AUTO: 87 FL (ref 80–100)
NRBC BLD-RTO: 0 /100 WBCS (ref 0–0)
PLATELET # BLD AUTO: 225 X10*3/UL (ref 150–450)
POTASSIUM SERPL-SCNC: 4.1 MMOL/L (ref 3.5–5.3)
PROT SERPL-MCNC: 5.4 G/DL (ref 6.4–8.2)
PROTHROMBIN TIME: 11.7 SECONDS (ref 9.8–12.8)
RBC # BLD AUTO: 2.8 X10*6/UL (ref 4–5.2)
RH FACTOR (ANTIGEN D): NORMAL
SODIUM SERPL-SCNC: 135 MMOL/L (ref 136–145)
WBC # BLD AUTO: 9.4 X10*3/UL (ref 4.4–11.3)

## 2024-04-23 PROCEDURE — 2500000004 HC RX 250 GENERAL PHARMACY W/ HCPCS (ALT 636 FOR OP/ED): Performed by: NURSE PRACTITIONER

## 2024-04-23 PROCEDURE — 45382 COLONOSCOPY W/CONTROL BLEED: CPT | Performed by: INTERNAL MEDICINE

## 2024-04-23 PROCEDURE — 80053 COMPREHEN METABOLIC PANEL: CPT | Performed by: NURSE PRACTITIONER

## 2024-04-23 PROCEDURE — 2500000005 HC RX 250 GENERAL PHARMACY W/O HCPCS: Performed by: NURSE ANESTHETIST, CERTIFIED REGISTERED

## 2024-04-23 PROCEDURE — 3700000001 HC GENERAL ANESTHESIA TIME - INITIAL BASE CHARGE

## 2024-04-23 PROCEDURE — 97530 THERAPEUTIC ACTIVITIES: CPT | Mod: GP,CQ

## 2024-04-23 PROCEDURE — 2720000007 HC OR 272 NO HCPCS

## 2024-04-23 PROCEDURE — 99232 SBSQ HOSP IP/OBS MODERATE 35: CPT | Performed by: NURSE PRACTITIONER

## 2024-04-23 PROCEDURE — 83735 ASSAY OF MAGNESIUM: CPT | Performed by: INTERNAL MEDICINE

## 2024-04-23 PROCEDURE — 2500000001 HC RX 250 WO HCPCS SELF ADMINISTERED DRUGS (ALT 637 FOR MEDICARE OP): Performed by: INTERNAL MEDICINE

## 2024-04-23 PROCEDURE — 85027 COMPLETE CBC AUTOMATED: CPT | Performed by: NURSE PRACTITIONER

## 2024-04-23 PROCEDURE — 36415 COLL VENOUS BLD VENIPUNCTURE: CPT | Performed by: INTERNAL MEDICINE

## 2024-04-23 PROCEDURE — 2500000004 HC RX 250 GENERAL PHARMACY W/ HCPCS (ALT 636 FOR OP/ED): Performed by: NURSE ANESTHETIST, CERTIFIED REGISTERED

## 2024-04-23 PROCEDURE — 1210000001 HC SEMI-PRIVATE ROOM DAILY

## 2024-04-23 PROCEDURE — 3700000002 HC GENERAL ANESTHESIA TIME - EACH INCREMENTAL 1 MINUTE

## 2024-04-23 PROCEDURE — 85610 PROTHROMBIN TIME: CPT | Performed by: NURSE PRACTITIONER

## 2024-04-23 PROCEDURE — 2500000004 HC RX 250 GENERAL PHARMACY W/ HCPCS (ALT 636 FOR OP/ED): Performed by: INTERNAL MEDICINE

## 2024-04-23 PROCEDURE — 86901 BLOOD TYPING SEROLOGIC RH(D): CPT | Performed by: INTERNAL MEDICINE

## 2024-04-23 PROCEDURE — 0W3P8ZZ CONTROL BLEEDING IN GASTROINTESTINAL TRACT, VIA NATURAL OR ARTIFICIAL OPENING ENDOSCOPIC: ICD-10-PCS | Performed by: INTERNAL MEDICINE

## 2024-04-23 RX ORDER — SODIUM CHLORIDE, SODIUM LACTATE, POTASSIUM CHLORIDE, CALCIUM CHLORIDE 600; 310; 30; 20 MG/100ML; MG/100ML; MG/100ML; MG/100ML
INJECTION, SOLUTION INTRAVENOUS CONTINUOUS PRN
Status: DISCONTINUED | OUTPATIENT
Start: 2024-04-23 | End: 2024-04-23

## 2024-04-23 RX ORDER — LIDOCAINE HYDROCHLORIDE 20 MG/ML
INJECTION, SOLUTION EPIDURAL; INFILTRATION; INTRACAUDAL; PERINEURAL AS NEEDED
Status: DISCONTINUED | OUTPATIENT
Start: 2024-04-23 | End: 2024-04-23

## 2024-04-23 RX ORDER — EPINEPHRINE 0.1 MG/ML
INJECTION INTRACARDIAC; INTRAVENOUS AS NEEDED
Status: COMPLETED | OUTPATIENT
Start: 2024-04-23 | End: 2024-04-23

## 2024-04-23 RX ORDER — MECLIZINE HCL 12.5 MG 12.5 MG/1
12.5 TABLET ORAL 3 TIMES DAILY PRN
Status: DISCONTINUED | OUTPATIENT
Start: 2024-04-23 | End: 2024-04-24 | Stop reason: HOSPADM

## 2024-04-23 RX ORDER — NITROGLYCERIN 0.4 MG/1
0.4 TABLET SUBLINGUAL EVERY 5 MIN PRN
Status: DISCONTINUED | OUTPATIENT
Start: 2024-04-23 | End: 2024-04-24 | Stop reason: HOSPADM

## 2024-04-23 RX ORDER — PROPOFOL 10 MG/ML
INJECTION, EMULSION INTRAVENOUS AS NEEDED
Status: DISCONTINUED | OUTPATIENT
Start: 2024-04-23 | End: 2024-04-23

## 2024-04-23 RX ORDER — PROPOFOL 10 MG/ML
INJECTION, EMULSION INTRAVENOUS CONTINUOUS PRN
Status: DISCONTINUED | OUTPATIENT
Start: 2024-04-23 | End: 2024-04-23

## 2024-04-23 RX ADMIN — PROPOFOL 10 MG: 10 INJECTION, EMULSION INTRAVENOUS at 15:08

## 2024-04-23 RX ADMIN — PROPOFOL 30 MG: 10 INJECTION, EMULSION INTRAVENOUS at 15:26

## 2024-04-23 RX ADMIN — PROPOFOL 40 MG: 10 INJECTION, EMULSION INTRAVENOUS at 14:59

## 2024-04-23 RX ADMIN — PANTOPRAZOLE SODIUM 40 MG: 40 TABLET, DELAYED RELEASE ORAL at 06:34

## 2024-04-23 RX ADMIN — LIDOCAINE HYDROCHLORIDE 20 MG: 20 INJECTION, SOLUTION EPIDURAL; INFILTRATION; INTRACAUDAL; PERINEURAL at 14:59

## 2024-04-23 RX ADMIN — TIOTROPIUM BROMIDE INHALATION SPRAY 2 PUFF: 3.12 SPRAY, METERED RESPIRATORY (INHALATION) at 06:35

## 2024-04-23 RX ADMIN — SODIUM CHLORIDE, SODIUM LACTATE, POTASSIUM CHLORIDE, AND CALCIUM CHLORIDE: 600; 310; 30; 20 INJECTION, SOLUTION INTRAVENOUS at 14:52

## 2024-04-23 RX ADMIN — LOSARTAN POTASSIUM 50 MG: 50 TABLET, FILM COATED ORAL at 20:52

## 2024-04-23 RX ADMIN — IRON SUCROSE 200 MG: 20 INJECTION, SOLUTION INTRAVENOUS at 16:11

## 2024-04-23 RX ADMIN — ATORVASTATIN CALCIUM 20 MG: 20 TABLET, FILM COATED ORAL at 20:52

## 2024-04-23 RX ADMIN — EPINEPHRINE 1 MG: 0.1 INJECTION INTRAVENOUS at 15:30

## 2024-04-23 RX ADMIN — PROPOFOL 20 MG: 10 INJECTION, EMULSION INTRAVENOUS at 15:18

## 2024-04-23 RX ADMIN — FLUTICASONE FUROATE AND VILANTEROL TRIFENATATE 1 PUFF: 100; 25 POWDER RESPIRATORY (INHALATION) at 06:34

## 2024-04-23 RX ADMIN — PROPOFOL 80 MCG/KG/MIN: 10 INJECTION, EMULSION INTRAVENOUS at 15:00

## 2024-04-23 SDOH — HEALTH STABILITY: MENTAL HEALTH: CURRENT SMOKER: 0

## 2024-04-23 ASSESSMENT — PAIN - FUNCTIONAL ASSESSMENT
PAIN_FUNCTIONAL_ASSESSMENT: 0-10
PAIN_FUNCTIONAL_ASSESSMENT: 0-10

## 2024-04-23 ASSESSMENT — COGNITIVE AND FUNCTIONAL STATUS - GENERAL
DRESSING REGULAR UPPER BODY CLOTHING: A LITTLE
CLIMB 3 TO 5 STEPS WITH RAILING: A LITTLE
WALKING IN HOSPITAL ROOM: A LITTLE
DRESSING REGULAR LOWER BODY CLOTHING: A LITTLE
TURNING FROM BACK TO SIDE WHILE IN FLAT BAD: A LITTLE
MOVING FROM LYING ON BACK TO SITTING ON SIDE OF FLAT BED WITH BEDRAILS: A LITTLE
MOBILITY SCORE: 16
WALKING IN HOSPITAL ROOM: A LITTLE
CLIMB 3 TO 5 STEPS WITH RAILING: A LOT
STANDING UP FROM CHAIR USING ARMS: A LITTLE
STANDING UP FROM CHAIR USING ARMS: A LITTLE
DAILY ACTIVITIY SCORE: 19
MOVING FROM LYING ON BACK TO SITTING ON SIDE OF FLAT BED WITH BEDRAILS: A LITTLE
HELP NEEDED FOR BATHING: A LITTLE
TOILETING: A LITTLE
TURNING FROM BACK TO SIDE WHILE IN FLAT BAD: A LITTLE
MOVING TO AND FROM BED TO CHAIR: A LITTLE
CLIMB 3 TO 5 STEPS WITH RAILING: TOTAL
MOBILITY SCORE: 17
MOVING TO AND FROM BED TO CHAIR: A LITTLE
MOVING TO AND FROM BED TO CHAIR: A LITTLE
MOBILITY SCORE: 20
STANDING UP FROM CHAIR USING ARMS: A LITTLE
PERSONAL GROOMING: A LITTLE
WALKING IN HOSPITAL ROOM: A LITTLE

## 2024-04-23 ASSESSMENT — PAIN SCALES - GENERAL
PAIN_LEVEL: 1
PAINLEVEL_OUTOF10: 0 - NO PAIN

## 2024-04-23 NOTE — PROGRESS NOTES
Physical Therapy    Physical Therapy Treatment    Patient Name: Gracie Carlson  MRN: [id number]  Today's Date: 4/23/2024  Time Calculation  Start Time: 1026  Stop Time: 1059  Time Calculation (min): 33 min       Assessment/Plan   End of Session Patient Position:  (Patient reclined in chair after treatment.  Call light and tray in reach. Chair alarm on.)    PT Plan  Inpatient/Swing Bed or Outpatient: Inpatient  Treatment/Interventions: Bed mobility, Transfer training, Gait training, Stair training, Balance training, Endurance training, Strengthening, Therapeutic exercise, Therapeutic activity, Home exercise program  PT Plan: Skilled PT  PT Frequency: 3 times per week  PT Discharge Recommendations: Moderate intensity level of continued care  Equipment Recommended upon Discharge:  (FWW) PT Recommended Transfer Status: Assist x1, Assistive device    General Visit Information:   PT  Visit  PT Received On: 04/23/24    General  Family/Caregiver Present:  (Patient's daughter in-law at bedside)  Co-Treatment:  (co-treatment performed with OT to maximize patient safety and function.  One unit billed for PT)  Prior to Session Communication:  (Cleared by RN for PT.  Patient scheduled for colonoscopy later today)  Patient Position Received:  (Patient presents in bed, agreeable to PT)    General Comment:  (Patient admitted after noticing toilet full of blood following BM)    General Observations:   General Observation:  (alarm, IV)    Precautions:  Precautions  LE Weight Bearing Status:  (RLE 50% WB)  Medical Precautions: Fall precautions  Post-Surgical Precautions:  (No active right hip abduction)      Vital Signs:  Vital Signs  SpO2:  (unable to obtain Sp02 reading)      Pain:  Pain Assessment  Pain Assessment: 0-10  Pain Score: 0 - No pain    Cognition:  Cognition  Overall Cognitive Status:  (mild confusion)  Following Commands:  (Patient follows commands with repition)      Balance:   Dynamic Standing Balance  Dynamic  "Standing-Comments: fair       Activity Tolerance:  Activity Tolerance  Endurance:  (Activity tolerance limited by SOB and fatigue)      Therapeutic Exercise  Therapeutic Exercise Performed:  (seated LE ther-ex: AP, QS, LAQ, seated marching x10)        Bed Mobility  Bed Mobility:  (supine-->sit: SBAx1  Bed flat to mimic home set.  \"Walks\" LE's over to EOB while using bed rail to elevate trunk. No c/o dizziness with change in position.)    Ambulation/Gait Training  Ambulation/Gait Training Performed:  (Patient amb 60' with ww and CGAx1.  Slow camilla, maintains 50% WB on RLE.  Increased fatigue and SOB with exertion.)    Transfers  Transfer:  (sit-->stand: min x1  One stand performed from EOB.  Patient demos good hand and RLE placement when standing.     sit-->stand: min x1  Patient does not follow commands to reach back for seat, but does extend RLE.  Not receptive to instructions to reach hands back when sitting.  \"I saw the chair there\"          Outcome Measures:  [facility] Basic Mobility  Turning from your back to your side while in a flat bed without using bedrails: A little  Moving from lying on your back to sitting on the side of a flat bed without using bedrails: A little  Moving to and from bed to chair (including a wheelchair): A little  Standing up from a chair using your arms (e.g. wheelchair or bedside chair): A little  To walk in hospital room: A little  Climbing 3-5 steps with railing: Total  Basic Mobility - Total Score: 16    Education Documentation  Handouts, taught by Renate Coon PTA at 4/23/2024 12:53 PM.  Learner: Patient  Readiness: Acceptance  Method: Explanation, Demonstration  Response: Needs Reinforcement    Precautions, taught by Renate Coon PTA at 4/23/2024 12:53 PM.  Learner: Patient  Readiness: Acceptance  Method: Explanation, Demonstration  Response: Needs Reinforcement    Home Exercise Program, taught by Renate Coon PTA at 4/23/2024 12:53 PM.  Learner: Patient  Readiness: " Acceptance  Method: Explanation, Demonstration  Response: Needs Reinforcement    Mobility Training, taught by Renate Coon PTA at 4/23/2024 12:53 PM.  Learner: Patient  Readiness: Acceptance  Method: Explanation, Demonstration  Response: Needs Reinforcement      Education Comments  Individual(s) Educated: Patient  Education Provided:  (Reinforced safe amb and transfers.)    Encounter Problems       Encounter Problems (Active)       PT Problem       Pt will demonstrate SBA with bed mobility to edge of bed.   (Progressing)       Start:  04/20/24    Expected End:  05/04/24            Pt will demonstrate SBA with sit to stand/chair transfers with FWW.   (Progressing)       Start:  04/20/24    Expected End:  05/04/24            Pt will ambulate 30 feet with FWW SBA.   (Progressing)       Start:  04/20/24    Expected End:  05/04/24            Pt to demo improved BLE strength by being able to complete supine/seated thera ex 2x20 BLEs with 4 or less rest breaks .   (Progressing)       Start:  04/20/24    Expected End:  05/04/24               Pain - Adult

## 2024-04-23 NOTE — ANESTHESIA POSTPROCEDURE EVALUATION
Patient: Gracie Carlson    Procedure Summary       Date: 04/23/24 Room / Location: [facility]    Anesthesia Start: 1452 Anesthesia Stop:     Procedure: COLONOSCOPY Diagnosis:       Hematochezia      Iron deficiency anemia, unspecified iron deficiency anemia type    Scheduled Providers: Cornelio Albert MD; Gracie Miller RN; Rebecca Babcock; Ines Menezes Responsible Provider: SEVERINO Jonas    Anesthesia Type: MAC ASA Status: 3            Anesthesia Type: MAC    Vitals Value Taken Time   /54 04/23/24 1536   Temp 36 04/23/24 1536   Pulse 68 04/23/24 1536   Resp 18 04/23/24 1536   SpO2 95 04/23/24 1536       Anesthesia Post Evaluation    Patient location during evaluation: bedside  Patient participation: complete - patient participated  Level of consciousness: awake  Pain score: 1  Pain management: adequate  Airway patency: patent  Cardiovascular status: acceptable  Respiratory status: acceptable  Hydration status: acceptable  Postoperative Nausea and Vomiting: none        There were no known notable events for this encounter.

## 2024-04-23 NOTE — PROGRESS NOTES
ASSESSMENT & PLAN:     Painless hematochezia  Acute on chronic iron deficiency anemia due to blood loss  -had hematochezia at rehab center, Hgb was 6.3 (was 7-10 in April, 10-11.5 in Jan)  -was new start xarelto for vte ppx after recent periprosthetic hip fx, and was taking asa 81  -per GI review, she has known hx of colonic avm, new start ppx dose AC might have caused this to bleed  -iron studies c/w CHIQUIS, getting prbc and venofer here  -s/p 2u pRBC this admission  Plan:  -gi consulted, initially was planned for conservative mgmt with no colonoscopy, however given continued Hgb drops, GI discussed with pt and now plan for colonoscopy. CLD today, prep tonight, c-scope tomorrow.   -cont to hold antiplatelet, ac agents  -Hgb 7.8 this AM, no further overt bleeding, rpt H&H in PM     FRANCES - Scr 1.50 on admission, baseline normal, was prerenal in s/o bleeding, now resolved with ivf and blood  B12 deficiency - B12 level here 187, cont PO supplementation  HTN - home losartan  CAD s/p remote PCI, DLD - hold ASA 81, cont statin  Chronic HFpEF - appears euvolemic, cont home losartan  COPD - not in acute exacerbation, cont home inhalers  R periprosthetic hip fx - pt/ot, presumably dispo is back to ar once bleeding resolved, keep outpt fu with ortho, pain control prn. Consulted ortho re vte ppx recs going forward as she did not tolerate Xarelto, however spoke with them informally, they said they have nothing to recommend and would like consult removed. I will stop Xarelto on discharge as risks outweigh benefits given admission for symptomatic lgib requiring blood transfusion, discussed with pt and daughter. Will likely do ASA 81 BID x28d, then resume once daily. Ideally would've liked ortho recs on this but they asked for consult to be removed.     Vte ppx scds  Dnr/dni, okay with icu, discussed with pt  Dispo: likely back to [facility] when ready    4/23/2024  Patient to have colonoscopy this afternoon. Reviewed patient hemoglobin  trend.  9.4 status post 1 unit of PRBCs however this morning at 7.9.  Discussed risk versus benefits of continued Xarelto use at this time patient and family agree to defer use of Xarelto.  GI states okay for patient to utilize aspirin however will defer continued usage patient and family.  CODE STATUS continues to be DNR/DNI.  Patient iron level returned at 20, IV Venofer to be given today also.  Will observe patient overnight status post colonoscopy.  Anticipate discharge once cleared from gastroenterology standpoint and hemoglobin level remained stable with no clinical signs of bleeding.    Farhana Peter, APRN-CNP    SUBJECTIVE     Patient seen and examined today.  Resting in bed with no complaints.  States that she feels well.  Daughter-in-law at bedside for support and updates.  Patient to have colonoscopy later today.      OBJECTIVE:       Last Recorded Vitals:  Vitals:    04/23/24 0116 04/23/24 0428 04/23/24 0819 04/23/24 1238   BP: 156/65 138/63 147/70 155/78   BP Location: Left arm Left arm Left arm Left arm   Patient Position: Lying Lying Lying Lying   Pulse: 92 87 82 95   Resp: 15 19 26 20   Temp: 36.3 °C (97.3 °F) 36.2 °C (97.2 °F) 36.5 °C (97.7 °F) 36.8 °C (98.2 °F)   TempSrc: Temporal Temporal Temporal Temporal   SpO2: 94% 96% 97% 95%   Weight:       Height:           Last I/O:  I/O last 3 completed shifts:  In: - (0 mL/kg)   Out: 2 (0 mL/kg) [Stool:2]  Weight: 50 kg     Physical Exam:  GEN: appears stated age, NAD  CV:  no LE edema  LUNGS: CTAB, no w/r/c, no resp distress, on ra  ABD: soft, NT  SKIN: scattered bruising  MSK; no gross deformities, normal joints  NEURO: A+Ox3, no FND  PSYCH: appropriate mood, affect    Inpatient Medications:  atorvastatin, 20 mg, oral, Nightly  cyanocobalamin, 1,000 mcg, oral, Daily  fluticasone furoate-vilanteroL, 1 puff, inhalation, Daily  iron sucrose, 200 mg, intravenous, Once  losartan, 50 mg, oral, Nightly  pantoprazole, 40 mg, oral, Daily before  breakfast  tiotropium, 2 puff, inhalation, Daily        PRN Medications  PRN medications: acetaminophen, HYDROcodone-acetaminophen, ipratropium-albuteroL, meclizine, nitroglycerin, ondansetron **OR** ondansetron    Continuous Medications:         LABS AND IMAGING:     Labs:  Results for orders placed or performed during the hospital encounter of 04/19/24 (from the past 24 hour(s))   Hemoglobin and hematocrit, blood   Result Value Ref Range    Hemoglobin 9.4 (L) 12.0 - 16.0 g/dL    Hematocrit 29.1 (L) 36.0 - 46.0 %   Comprehensive Metabolic Panel   Result Value Ref Range    Glucose 79 74 - 99 mg/dL    Sodium 135 (L) 136 - 145 mmol/L    Potassium 4.1 3.5 - 5.3 mmol/L    Chloride 104 98 - 107 mmol/L    Bicarbonate 21 21 - 32 mmol/L    Anion Gap 14 10 - 20 mmol/L    Urea Nitrogen 21 6 - 23 mg/dL    Creatinine 0.77 0.50 - 1.05 mg/dL    eGFR 71 >60 mL/min/1.73m*2    Calcium 8.3 (L) 8.6 - 10.3 mg/dL    Albumin 3.1 (L) 3.4 - 5.0 g/dL    Alkaline Phosphatase 53 33 - 136 U/L    Total Protein 5.4 (L) 6.4 - 8.2 g/dL    AST 20 9 - 39 U/L    Bilirubin, Total 1.5 (H) 0.0 - 1.2 mg/dL    ALT 15 7 - 45 U/L   Protime-INR   Result Value Ref Range    Protime 11.7 9.8 - 12.8 seconds    INR 1.0 0.9 - 1.1   Magnesium   Result Value Ref Range    Magnesium 1.84 1.60 - 2.40 mg/dL   SST TOP   Result Value Ref Range    Extra Tube Hold for add-ons.    Type And Screen   Result Value Ref Range    ABO TYPE A     Rh TYPE NEG     ANTIBODY SCREEN NEG    CBC   Result Value Ref Range    WBC 9.4 4.4 - 11.3 x10*3/uL    nRBC 0.0 0.0 - 0.0 /100 WBCs    RBC 2.80 (L) 4.00 - 5.20 x10*6/uL    Hemoglobin 7.9 (L) 12.0 - 16.0 g/dL    Hematocrit 24.3 (L) 36.0 - 46.0 %    MCV 87 80 - 100 fL    MCH 28.2 26.0 - 34.0 pg    MCHC 32.5 32.0 - 36.0 g/dL    RDW 15.7 (H) 11.5 - 14.5 %    Platelets 225 150 - 450 x10*3/uL        Imaging:  XR chest 1 view  Narrative: Interpreted By:  Madisyn Ng,   STUDY:  XR CHEST 1 VIEW;  4/21/2024 4:35 pm       INDICATION:  Signs/Symptoms:dyspnea, slightly more lethargic, s/p blood  transfusion, eval for any edema.      COMPARISON:  04/19/2024      ACCESSION NUMBER(S):  [id number]      ORDERING CLINICIAN:  PATRICIA MONTOYA      FINDINGS:  Patient is rotated. The heart may be normal in size. There are dense  calcifications of the mitral annulus.      Patient is status post aortic valve replacement.      There is no discrete consolidation or obvious pleural fluid. The left  heart border however is not well defined.      Patient is scoliotic.      COMPARISON OF FINDING:  The left heart border is less well defined. This may be due to  rotation.      Impression: Somewhat limited exam. It is difficult to exclude left basilar  infiltrate and/or effusion.      MACRO:  none      Signed by: Madisyn Ng 4/22/2024 7:51 AM  Dictation workstation:   [id number]

## 2024-04-23 NOTE — ANESTHESIA PREPROCEDURE EVALUATION
Gracie Carlson is a 89 y.o. female here for:    Colonoscopy  With Cornelio Albert MD  Hematochezia  Iron deficiency anemia, unspecified iron deficiency anemia type    Relevant Problems   Cardiac  Echo 1/2023:  CONCLUSIONS:   1. Left ventricular systolic function is normal with a 60-65% estimated ejection fraction.   2. The left atrium is moderately dilated.   3. The mitral valve is moderately thickened.   4. There is moderate thickening and calcification of the anterior and posterior mitral valve leaflets.   5. There is moderate to severe mitral annular calcification.   6. Moderate mitral valve regurgitation.   7. Mild tricuspid regurgitation is visualized.   8. Aortic valve stenosis is not present.   9. There is a bioprosthetic aortic valve.  10. Mild aortic valve regurgitation.  11. Moderately elevated pulmonary artery pressure.    Prior history of severe aortic stenosis now resolved s/p AVR in 2016     (+) Aortic valve stenosis, severe (Resolved)   (+) Benign essential HTN   (+) CAD (coronary artery disease)   (+) Chronic diastolic congestive heart failure (Multi)   (+) Mitral valve stenosis   (+) Mixed hyperlipidemia      Pulmonary   (+) Asthma (HHS-HCC)   (+) Chronic obstructive pulmonary disease (Multi)   (+) Pulmonary hypertension (Multi)      GI   (+) Bright red blood per rectum      Endocrine   (+) Hypothyroidism      Musculoskeletal   (+) Localized, primary osteoarthritis of shoulder region   (+) Primary osteoarthritis of right shoulder       Lab Results   Component Value Date    HGB 7.9 (L) 04/23/2024    HCT 24.3 (L) 04/23/2024    WBC 9.4 04/23/2024     04/23/2024     (L) 04/23/2024    K 4.1 04/23/2024     04/23/2024    CREATININE 0.77 04/23/2024    BUN 21 04/23/2024       Social History     Tobacco Use   Smoking Status Former    Types: Cigarettes   Smokeless Tobacco Never       Allergies   Allergen Reactions    Oxycodone Unknown    Sulfa (Sulfonamide Antibiotics) Unknown        Current Outpatient Medications   Medication Instructions    ascorbic acid (VITAMIN C) 500 mg, oral, Daily    aspirin 81 mg chewable tablet 1 tablet, oral, Daily    atorvastatin (Lipitor) 20 mg tablet 1 tablet, oral, Nightly    bisacodyl (Dulcolax) 5 mg EC tablet 1 tablet, oral, As needed    cholecalciferol (VITAMIN D-3) 1,000 Units, oral, Daily    fluticasone propion-salmeteroL (Advair Diskus) 100-50 mcg/dose diskus inhaler 1 puff, inhalation, 2 times daily, Rinse mouth with water after use to reduce aftertaste and incidence of candidiasis. Do not swallow.    HYDROcodone-acetaminophen (Norco) 5-325 mg tablet 1 tablet, oral, Every 6 hours PRN    losartan (Cozaar) 50 mg tablet 1 tablet, oral, Nightly    meclizine (Antivert) 12.5 mg tablet 1 tablet, oral, 3 times daily PRN    nitroglycerin (Nitrostat) 0.4 mg SL tablet 1 tablet, sublingual, Every 5 min PRN    ondansetron (ZOFRAN) 4 mg, oral, Every 8 hours PRN    polyethylene glycol (MIRALAX) 17 g, oral, Daily PRN    rivaroxaban (XARELTO) 10 mg, oral, Daily    tiotropium (Spiriva) 18 mcg inhalation capsule 1 capsule, inhalation, Daily       Past Surgical History:   Procedure Laterality Date    AORTIC VALVE REPLACEMENT  01/15/2016    Aortic Valve Replacement    CORONARY ANGIOPLASTY  01/15/2016    PTCA    CT ABDOMEN PELVIS ANGIOGRAM W AND/OR WO IV CONTRAST  11/6/2015    CT ABDOMEN PELVIS ANGIOGRAM W AND/OR WO IV CONTRAST 11/6/2015 [facility] ANCILLARY LEGACY    ESOPHAGOGASTRODUODENOSCOPY  01/15/2016    Diagnostic Esophagogastroduodenoscopy    HYSTERECTOMY  01/15/2016    Hysterectomy    KNEE SURGERY  01/15/2016    Knee Surgery Left    MR HEAD ANGIO WO IV CONTRAST  1/22/2023    MR HEAD ANGIO WO IV CONTRAST 1/22/2023 [facility]CTOR OFFICE LEGACY    MR NECK ANGIO WO IV CONTRAST  1/22/2023    MR NECK ANGIO WO IV CONTRAST 1/22/2023 DOCTOR OFFICE LEGACY    OTHER SURGICAL HISTORY  05/19/2022    Appendectomy    OTHER SURGICAL HISTORY  05/19/2022    Cataract surgery    OTHER SURGICAL HISTORY   05/19/2022    Colonoscopy    OTHER SURGICAL HISTORY  05/19/2022    Hip replacement    OTHER SURGICAL HISTORY  05/19/2022    Tonsillectomy       Family History   Problem Relation Name Age of Onset    Hypertension Mother      Heart disease Father          RHEUMATIC    Hypertension Father      Coronary artery disease Father      Other (ENDOCARDITIS) Father         NPO Details:  No data recorded    Physical Exam    Airway  Mallampati: II     Cardiovascular - normal exam     Dental    Pulmonary - normal exam     Abdominal            Anesthesia Plan    History of general anesthesia?: yes  History of complications of general anesthesia?: no    ASA 3     MAC     The patient is not a current smoker.    intravenous induction   Anesthetic plan and risks discussed with patient.    Plan discussed with CRNA.

## 2024-04-24 VITALS
DIASTOLIC BLOOD PRESSURE: 59 MMHG | TEMPERATURE: 98.2 F | RESPIRATION RATE: 16 BRPM | OXYGEN SATURATION: 97 % | HEIGHT: 60 IN | SYSTOLIC BLOOD PRESSURE: 129 MMHG | BODY MASS INDEX: 21.64 KG/M2 | HEART RATE: 86 BPM | WEIGHT: 110.23 LBS

## 2024-04-24 LAB
ANION GAP SERPL CALC-SCNC: 13 MMOL/L (ref 10–20)
BUN SERPL-MCNC: 18 MG/DL (ref 6–23)
CALCIUM SERPL-MCNC: 8.2 MG/DL (ref 8.6–10.3)
CHLORIDE SERPL-SCNC: 103 MMOL/L (ref 98–107)
CO2 SERPL-SCNC: 22 MMOL/L (ref 21–32)
CREAT SERPL-MCNC: 0.71 MG/DL (ref 0.5–1.05)
EGFRCR SERPLBLD CKD-EPI 2021: 78 ML/MIN/1.73M*2
ERYTHROCYTE [DISTWIDTH] IN BLOOD BY AUTOMATED COUNT: 15.8 % (ref 11.5–14.5)
GLUCOSE SERPL-MCNC: 86 MG/DL (ref 74–99)
HCT VFR BLD AUTO: 24.6 % (ref 36–46)
HGB BLD-MCNC: 8.1 G/DL (ref 12–16)
HOLD SPECIMEN: NORMAL
MCH RBC QN AUTO: 28.5 PG (ref 26–34)
MCHC RBC AUTO-ENTMCNC: 32.9 G/DL (ref 32–36)
MCV RBC AUTO: 87 FL (ref 80–100)
NRBC BLD-RTO: 0 /100 WBCS (ref 0–0)
PLATELET # BLD AUTO: 276 X10*3/UL (ref 150–450)
POTASSIUM SERPL-SCNC: 4.1 MMOL/L (ref 3.5–5.3)
RBC # BLD AUTO: 2.84 X10*6/UL (ref 4–5.2)
SODIUM SERPL-SCNC: 134 MMOL/L (ref 136–145)
WBC # BLD AUTO: 9.9 X10*3/UL (ref 4.4–11.3)

## 2024-04-24 PROCEDURE — 97530 THERAPEUTIC ACTIVITIES: CPT | Mod: GO,CO

## 2024-04-24 PROCEDURE — 36415 COLL VENOUS BLD VENIPUNCTURE: CPT | Performed by: NURSE PRACTITIONER

## 2024-04-24 PROCEDURE — 99239 HOSP IP/OBS DSCHRG MGMT >30: CPT | Performed by: NURSE PRACTITIONER

## 2024-04-24 PROCEDURE — 97535 SELF CARE MNGMENT TRAINING: CPT | Mod: GO,CO

## 2024-04-24 PROCEDURE — 85027 COMPLETE CBC AUTOMATED: CPT | Performed by: NURSE PRACTITIONER

## 2024-04-24 PROCEDURE — 2500000001 HC RX 250 WO HCPCS SELF ADMINISTERED DRUGS (ALT 637 FOR MEDICARE OP): Performed by: INTERNAL MEDICINE

## 2024-04-24 PROCEDURE — 80048 BASIC METABOLIC PNL TOTAL CA: CPT | Performed by: NURSE PRACTITIONER

## 2024-04-24 RX ORDER — LANOLIN ALCOHOL/MO/W.PET/CERES
1000 CREAM (GRAM) TOPICAL DAILY
Qty: 30 TABLET | Refills: 0 | Status: SHIPPED | OUTPATIENT
Start: 2024-04-24 | End: 2024-05-24

## 2024-04-24 RX ORDER — PANTOPRAZOLE SODIUM 40 MG/1
40 TABLET, DELAYED RELEASE ORAL
Qty: 30 TABLET | Refills: 0 | Status: SHIPPED | OUTPATIENT
Start: 2024-04-25 | End: 2024-05-25

## 2024-04-24 RX ORDER — ONDANSETRON 4 MG/1
4 TABLET, FILM COATED ORAL EVERY 8 HOURS PRN
Qty: 20 TABLET | Refills: 0 | Status: SHIPPED | OUTPATIENT
Start: 2024-04-24

## 2024-04-24 RX ADMIN — PANTOPRAZOLE SODIUM 40 MG: 40 TABLET, DELAYED RELEASE ORAL at 06:38

## 2024-04-24 RX ADMIN — FLUTICASONE FUROATE AND VILANTEROL TRIFENATATE 1 PUFF: 100; 25 POWDER RESPIRATORY (INHALATION) at 06:38

## 2024-04-24 RX ADMIN — CYANOCOBALAMIN TAB 500 MCG 1000 MCG: 500 TAB at 09:44

## 2024-04-24 RX ADMIN — TIOTROPIUM BROMIDE INHALATION SPRAY 2 PUFF: 3.12 SPRAY, METERED RESPIRATORY (INHALATION) at 06:38

## 2024-04-24 ASSESSMENT — COGNITIVE AND FUNCTIONAL STATUS - GENERAL
DAILY ACTIVITIY SCORE: 17
TOILETING: A LITTLE
HELP NEEDED FOR BATHING: A LOT
HELP NEEDED FOR BATHING: A LITTLE
DAILY ACTIVITIY SCORE: 19
DRESSING REGULAR LOWER BODY CLOTHING: A LOT
DRESSING REGULAR UPPER BODY CLOTHING: A LITTLE
PERSONAL GROOMING: A LITTLE
DRESSING REGULAR LOWER BODY CLOTHING: A LITTLE
TOILETING: A LITTLE
PERSONAL GROOMING: A LITTLE
DRESSING REGULAR UPPER BODY CLOTHING: A LITTLE

## 2024-04-24 ASSESSMENT — ACTIVITIES OF DAILY LIVING (ADL): HOME_MANAGEMENT_TIME_ENTRY: 14

## 2024-04-24 ASSESSMENT — PAIN SCALES - GENERAL
PAINLEVEL_OUTOF10: 0 - NO PAIN
PAINLEVEL_OUTOF10: 0 - NO PAIN

## 2024-04-24 NOTE — CARE PLAN
The patient's goals for the shift include      The clinical goals for the shift include patients H&H will be trending up with am lab work    Over the shift, the patient did not make progress toward the following goals. Barriers to progression include . Recommendations to address these barriers include .

## 2024-04-24 NOTE — PROGRESS NOTES
Occupational Therapy    OT Treatment    Patient Name: Gracie Carlson  MRN: [id number]  Today's Date: 4/24/2024  Time Calculation  Start Time: 1136  Stop Time: 1204  Time Calculation (min): 28 min       Assessment:  End of Session Communication: Bedside nurse  End of Session Patient Position: Up in chair, Alarm on     Plan:  Treatment Interventions: ADL retraining, Functional transfer training, Endurance training, UE strengthening/ROM, Patient/family training  OT Frequency: 2 times per week  Treatment Interventions: ADL retraining, Functional transfer training, Endurance training, UE strengthening/ROM, Patient/family training    Subjective   Previous Visit Info:  OT Last Visit  OT Received On: 04/24/24  General:  General  Prior to Session Communication: Bedside nurse  Patient Position Received: Bed, 3 rail up, Alarm on  Precautions:  LE Weight Bearing Status: Right Partial Weight Bearing (50%)  Medical Precautions: Fall precautions  Post-Surgical Precautions:  (No active right hip abduction)     Pain:  Pain Assessment  Pain Score: 0 - No pain    Objective    Cognition:  Cognition  Overall Cognitive Status: Impaired  Orientation Level: Disoriented to situation  Following Commands: Follows multistep commands with repetition (~80%)  Safety Judgment: Decreased awareness of need for safety precautions  Problem Solving: Assistance required to implement solutions  Insight: Moderate  Impulsive: Within functional limits     Activities of Daily Living: LE Dressing  LE Dressing: Yes  Pants Level of Assistance:  (donned with mod a. mod a for pants mgmt while in stance)  Adult Briefs Level of Assistance:  (donned with mod a. mod a for pants mgmt while in stance)  LE Dressing Where Assessed: Edge of bed    Toileting  Toileting Level of Assistance: Close supervision  Where Assessed: Toilet  Functional Standing Tolerance:  Functional Standing Tolerance Comments: patient stood for a total of 4 mins this date with fair/fair- balance  with 2-0 ue support as needed during functional ambulation/transfers and ADL tasks, able to maintain 50% WB RLE this date  Bed Mobility/Transfers: Bed Mobility 1  Bed Mobility 1: Supine to sitting  Level of Assistance 1:  (SBA)    Transfer 1  Technique 1: Sit to stand  Transfer Device 1: Walker  Transfer Level of Assistance 1: Minimum assistance, Moderate verbal cues  Transfers 2  Transfer Device 2: Walker  Transfer Level of Assistance 2: Contact guard  Trials/Comments 2: functional ambulation    Toilet Transfers  Toilet Transfer Type: To and from  Toilet Transfer to: Raised toilet seat without rails (with use of grab bars)  Toilet Transfer Technique: Ambulating  Toilet Transfers: Minimal assistance  Sitting Balance:  Dynamic Sitting Balance  Dynamic Sitting-Balance:  (fair+)  Standing Balance:  Dynamic Standing Balance  Dynamic Standing-Balance:  (fair/fair-)      Outcome Measures:[facility] Daily Activity  Putting on and taking off regular lower body clothing: A lot  Bathing (including washing, rinsing, drying): A lot  Putting on and taking off regular upper body clothing: A little  Toileting, which includes using toilet, bedpan or urinal: A little  Taking care of personal grooming such as brushing teeth: A little  Eating Meals: None  Daily Activity - Total Score: 17    Education Documentation  Body Mechanics, taught by SIDDHARTHA Bravo at 4/24/2024 12:43 PM.  Learner: Patient  Readiness: Acceptance  Method: Explanation  Response: Needs Reinforcement    Precautions, taught by SIDDHARTHA Bravo at 4/24/2024 12:43 PM.  Learner: Patient  Readiness: Acceptance  Method: Explanation  Response: Needs Reinforcement    ADL Training, taught by SIDDHARTHA Bravo at 4/24/2024 12:43 PM.  Learner: Patient  Readiness: Acceptance  Method: Explanation  Response: Needs Reinforcement    OP EDUCATION:  Education  Individual(s) Educated: Patient  Education Provided: Diagnosis & Precautions, Symptom management, Ergonomics and  postural realignment, Joint protection and energy conservation, Fall precautons, Risk and benefits of OT discussed with patient or other, POC discussed and agreed upon  Diagnosis and Precautions: 50% WB RLE no active ADBuction  Equipment:  (AE, EC tech, home DME)  Patient/Caregiver Demonstrated Understanding: yes  Plan of Care Discussed and Agreed Upon: yes  Patient Response to Education: Patient/Caregiver Verbalized Understanding of Information    Goals:  Encounter Problems       Encounter Problems (Active)       OT Goals       Pt will complete all functional transfers with MOD I. (Progressing)       Start:  04/20/24    Expected End:  05/04/24            Pt will complete LB dressing with MOD I. (Progressing)       Start:  04/20/24    Expected End:  05/04/24            Pt will improve dynamic standing balance to fair+ in prep for ADL tasks while adhering to WB px.  (Progressing)       Start:  04/20/24    Expected End:  05/04/24            Pt will tolerate 10 mins of functional activities with 2 or fewer rest breaks. (Progressing)       Start:  04/20/24    Expected End:  05/04/24            Pt will complete all functional mobility with MOD I in prep for ADL tasks. (Progressing)       Start:  04/20/24    Expected End:  05/04/24

## 2024-04-24 NOTE — DOCUMENTATION CLARIFICATION NOTE
"    PATIENT:               ROSALINDA NAYAK  ACCT #:                  [id number]  MRN:                       [id number]  :                       1928  ADMIT DATE:       2024 9:37 AM  DISCH DATE:  RESPONDING PROVIDER #:        [id number]          PROVIDER RESPONSE TEXT:    Coagulopathy, AVM of colon with bleeding due to medication administration xarelto    CDI QUERY TEXT:    Clarification        Instruction:    Based on your assessment of the patient and the clinical information, please provide the requested documentation by clicking on the appropriate radio button and enter any additional information if prompted.    Question: Is there a diagnosis indicative of the clinical findings and patient symptoms    When answering this query, please exercise your independent professional judgment. The fact that a question is being asked, does not imply that any particular answer is desired or expected.    The patient's clinical indicators include:  Clinical Information: 89 yo female admitted with rectal bleeding and acute blood loss anemia.    Clinical Indicators:  Hgb: 6.3 ( 0555), 6.8 ( 0954), 7.9 ( 1602), 7.5 ( 0553), 6.7 ( 0652), 8.2 ( 1803), 7.8 ( 0638)     Aleida Alston NP: \"Remote GIB 2/2 AVM cecum on colonoscopy at [facility] ()\"  \"weigh risk versus benefits of continuing Xarelto due to risk of bleeding.  Baby aspirin okay.  Likely Xarelto aggravated angioectasia in the colon\"     Dr Carranza GI Consult: \"Most likely cause is intermittent bleeding from the AVM in the GI tract exacerbated by anticoagulation.\"    Treatment: GI consult, colonscopy scheduled, PPI daily, hold xarelto, NS IV bolus 500 ml, Venofer, 3 u PRBCs    Risk Factors: AVM, xarelto  Options provided:  -- [ Coagulopathy, AVM of colon with bleeding due to medication administration xarelto  -- Other - I will add my own diagnosis  -- Refer to Clinical Documentation Reviewer    Query created by: Angy Patel on " 4/24/2024 4:16 PM      Electronically signed by:  MAILE ALAN 4/24/2024 4:27 PM

## 2024-04-24 NOTE — CARE PLAN
Problem: Skin  Goal: Decreased wound size/increased tissue granulation at next dressing change  Outcome: Progressing  Flowsheets (Taken 4/24/2024 1000)  Decreased wound size/increased tissue granulation at next dressing change:   Promote sleep for wound healing   Protective dressings over bony prominences  Goal: Participates in plan/prevention/treatment measures  Recent Flowsheet Documentation  Taken 4/24/2024 1000 by Lauren Garsia RN  Participates in plan/prevention/treatment measures:   Discuss with provider PT/OT consult   Elevate heels   Increase activity/out of bed for meals  Goal: Prevent/manage excess moisture  Recent Flowsheet Documentation  Taken 4/24/2024 1000 by Lauren Garsia RN  Prevent/manage excess moisture:   Moisturize dry skin   Cleanse incontinence/protect with barrier cream  Goal: Prevent/minimize sheer/friction injuries  Recent Flowsheet Documentation  Taken 4/24/2024 1000 by Lauren Garsia RN  Prevent/minimize sheer/friction injuries:   HOB 30 degrees or less   Turn/reposition every 2 hours/use positioning/transfer devices   Increase activity/out of bed for meals  Goal: Promote/optimize nutrition  Recent Flowsheet Documentation  Taken 4/24/2024 1000 by Lauren Garsia RN  Promote/optimize nutrition: Consume > 50% meals/supplements  Goal: Promote skin healing  Recent Flowsheet Documentation  Taken 4/24/2024 1000 by Lauren Garsia RN  Promote skin healing:   Turn/reposition every 2 hours/use positioning/transfer devices   Protective dressings over bony prominences   Assess skin/pad under line(s)/device(s)  Goal: Participates in plan/prevention/treatment measures  Outcome: Progressing  Flowsheets (Taken 4/24/2024 1000)  Participates in plan/prevention/treatment measures:   Discuss with provider PT/OT consult   Elevate heels   Increase activity/out of bed for meals  Goal: Prevent/manage excess moisture  Outcome: Progressing  Flowsheets (Taken 4/24/2024 1000)  Prevent/manage excess  moisture:   Moisturize dry skin   Cleanse incontinence/protect with barrier cream  Goal: Prevent/minimize sheer/friction injuries  Outcome: Progressing  Flowsheets (Taken 4/24/2024 1000)  Prevent/minimize sheer/friction injuries:   HOB 30 degrees or less   Turn/reposition every 2 hours/use positioning/transfer devices   Increase activity/out of bed for meals  Goal: Promote/optimize nutrition  Outcome: Progressing  Flowsheets (Taken 4/24/2024 1000)  Promote/optimize nutrition: Consume > 50% meals/supplements  Goal: Promote skin healing  Outcome: Progressing  Flowsheets (Taken 4/24/2024 1000)  Promote skin healing:   Turn/reposition every 2 hours/use positioning/transfer devices   Protective dressings over bony prominences   Assess skin/pad under line(s)/device(s)     Problem: Fall/Injury  Goal: Not fall by end of shift  Outcome: Progressing  Goal: Be free from injury by end of the shift  Outcome: Progressing  Goal: Verbalize understanding of personal risk factors for fall in the hospital  Outcome: Progressing  Goal: Verbalize understanding of risk factor reduction measures to prevent injury from fall in the home  Outcome: Progressing  Goal: Use assistive devices by end of the shift  Outcome: Progressing  Goal: Pace activities to prevent fatigue by end of the shift  Outcome: Progressing     Problem: Psychosocial Needs  Goal: Demonstrates ability to cope with hospitalization/illness  Outcome: Progressing      The clinical goals for the shift include patient will be free of falls for shift

## 2024-04-24 NOTE — PROGRESS NOTES
Department of Internal Medicine  Gastroenterology  Progress note      Subjective  GI is following for hematochezia and anemia    Patient is s/p colonoscopy yesterday.  Discussed results with patient and family.  Hemoglobin is remained stable from yesterday with no further evidence of overt GI bleeding since scope.  Patient had 1 nonbloody bowel movement this morning per RN.      Current Medication    Current Facility-Administered Medications:     acetaminophen (Tylenol) tablet 650 mg, 650 mg, oral, q4h PRN, Samir Gama MD    atorvastatin (Lipitor) tablet 20 mg, 20 mg, oral, Nightly, Samir Gama MD, 20 mg at 04/23/24 2052    cyanocobalamin (Vitamin B-12) tablet 1,000 mcg, 1,000 mcg, oral, Daily, Samir Gama MD, 1,000 mcg at 04/24/24 0944    fluticasone furoate-vilanteroL (Breo Ellipta) 100-25 mcg/dose inhaler 1 puff, 1 puff, inhalation, Daily, Samir Gama MD, 1 puff at 04/24/24 0638    HYDROcodone-acetaminophen (Norco) 5-325 mg per tablet 1 tablet, 1 tablet, oral, q6h PRN, Samir Gama MD    ipratropium-albuteroL (Duo-Neb) 0.5-2.5 mg/3 mL nebulizer solution 3 mL, 3 mL, nebulization, q4h PRN, Samir Gama MD    losartan (Cozaar) tablet 50 mg, 50 mg, oral, Nightly, Samir Gama MD, 50 mg at 04/23/24 2052    meclizine (Antivert) tablet 12.5 mg, 12.5 mg, oral, TID PRN, WAQAS Harper    nitroglycerin (Nitrostat) SL tablet 0.4 mg, 0.4 mg, sublingual, q5 min PRN, WAQAS Harper    ondansetron (Zofran) tablet 4 mg, 4 mg, oral, q8h PRN **OR** ondansetron (Zofran) injection 4 mg, 4 mg, intravenous, q8h PRN, Samir Gama MD    pantoprazole (ProtoNix) EC tablet 40 mg, 40 mg, oral, Daily before breakfast, Samir Gama MD, 40 mg at 04/24/24 0638    tiotropium (Spiriva Respimat) 2.5 mcg/actuation inhaler 2 puff, 2 puff, inhalation, Daily, Samir Gama MD, 2 puff at 04/24/24 0638    Past Medical History  Active Ambulatory Problems     Diagnosis Date Noted    Asthma ([facility])  10/16/2023    Balance problem 10/16/2023    Benign essential HTN 10/16/2023    CAD (coronary artery disease) 10/16/2023    Chronic diastolic congestive heart failure (Multi) 10/16/2023    Chronic obstructive pulmonary disease (Multi) 10/16/2023    Dysphonia 10/16/2023    Eye problem 10/16/2023    Generalized arthritis 10/16/2023    Heart disease 10/16/2023    Hypothyroidism 10/16/2023    Mitral valve stenosis 10/16/2023    Mixed hyperlipidemia 10/16/2023    Periprosth fracture around unsp internal prosth joint, subs 10/16/2023    Localized, primary osteoarthritis of shoulder region 10/16/2023    Rotator cuff tear arthropathy of right shoulder 10/16/2023    Primary osteoarthritis of right shoulder 10/16/2023    Pulmonary hypertension (Multi) 10/16/2023    S/P TAVR (transcatheter aortic valve replacement) 10/16/2023    Weakness of both hips 10/16/2023    Former smoker 10/16/2023    Fall, initial encounter 04/16/2024    [place]-prosthetic fracture around prosthetic hip 04/19/2024    Impaired mobility and ADLs 04/19/2024    Pain 04/19/2024    Slow transit constipation 04/19/2024     Resolved Ambulatory Problems     Diagnosis Date Noted    Aortic valve stenosis, severe 10/16/2023     Past Medical History:   Diagnosis Date    Hypothyroidism, unspecified 01/15/2016    Personal history of diseases of the blood and blood-forming organs and certain disorders involving the immune mechanism 11/13/2015    Personal history of other diseases of the circulatory system 01/15/2016    Personal history of other diseases of the circulatory system 01/15/2016    Personal history of other diseases of the musculoskeletal system and connective tissue 11/13/2015    Pulmonary hypertension, unspecified (Multi) 01/15/2016       PHYSICAL EXAM  VS: /59 (BP Location: Left arm, Patient Position: Lying)   Pulse 86   Temp 36.8 °C (98.2 °F) (Temporal)   Resp 16   Ht 1.524 m (5')   Wt 50 kg (110 lb 3.7 oz)   SpO2 97%   BMI 21.53 kg/m²  Body  mass index is 21.53 kg/m².       DATA  Recent blood work and relevant radiology and endoscopic studies were reviewed and discussed with the patient   Results from last 7 days   Lab Units 04/24/24  0646   WBC AUTO x10*3/uL 9.9   RBC AUTO x10*6/uL 2.84*   HEMOGLOBIN g/dL 8.1*   HEMATOCRIT % 24.6*   MCV fL 87   MCHC g/dL 32.9   RDW % 15.8*   PLATELETS AUTO x10*3/uL 276       Results from last 72 hours   Lab Units 04/24/24  0646 04/23/24  0700   SODIUM mmol/L 134* 135*   POTASSIUM mmol/L 4.1 4.1   CHLORIDE mmol/L 103 104   CO2 mmol/L 22 21   BUN mg/dL 18 21   CREATININE mg/dL 0.71 0.77   CALCIUM mg/dL 8.2* 8.3*   PROTEIN TOTAL g/dL  --  5.4*   BILIRUBIN TOTAL mg/dL  --  1.5*   ALK PHOS U/L  --  53   AST U/L  --  20   ALT U/L  --  15       Results from last 72 hours   Lab Units 04/23/24  0700   INR  1.0         Colonoscopy 4/23/2024 with Dr. Albert:  Findings  Single large angioectasia in the cecum; bleeding was observed; placed 1 clip successfully and 1 clip unsuccessfully (clips are MRI conditional); hemostasis achieved; injected epinephrine to address bleeding; hemostasis achieved  3 small angioectasias in the cecum and ascending colon; no bleeding was identified; induced coagulation and hemostasis achieved with with argon plasma coagulation. Right colon setting.  Multiple small, scattered diverticula in the sigmoid colon; no bleeding was identified  External hemorrhoids observed during retroflexion; no bleeding was identified        IMPRESSION/RECOMMENDATIONS  Gracie Carlson is a 89 y.o. female with a PMH of osteoporosis, recent fall with right periprosthetic hip fracture (nonsurgical), hypothyroidism, HTN, HLD, mild COPD, aortic valve stenosis, TAVR, CAD, CHF presents to the emergency department of [facility] from skilled nursing facility with concern for painless rectal bleeding described as bright red blood per rectum and Hgb of 6.8.        Hgb has remained stable since colonoscopy yesterday with no further  evidence of overt GI bleeding.      Painless rectal bleeding 2/2 AVM in colon  Acute on chronic anemia due to blood loss -CHIQUIS.  Hgb 6.7-> 7.8 s/p 2 units PRBCs. Hemoglobin baseline 10.  Iron 20, TIBC 334, iron saturation 6%.  Ferritin 37.    Remote GIB 2/2 AVM cecum on colonoscopy at [facility] (2014)  Occasional constipation     PLAN  -Recommend weighing benefits versus risks of continuing anticoagulation.  Patient at risk to bleed on anticoagulation due to recurrent AVMs. (Per hospitalist NP, the family decided they did not want to resume xarelto at this time)  -Trend H&H and transfuse PRBC for Hgb <7.0  -Recommend iron infusions  -Continue supportive care per primary  -On PPI daily  -Bowel regimen as needed                   Discussed with Dr. Agrawal, GI will sign off  Total of 30 minutes spent on visit and overall professional care of the patient including patient/family education      (Electronically signed byWAQAS Alcantara on 4/24/2024 at 3:52 PM)

## 2024-04-24 NOTE — DISCHARGE SUMMARY
Discharge Diagnosis  Bright red blood per rectum    Issues Requiring Follow-Up  Blood loss anemia    Discharge Meds     Your medication list        START taking these medications        Instructions Last Dose Given Next Dose Due   cyanocobalamin 1,000 mcg tablet  Commonly known as: Vitamin B-12      Take 1 tablet (1,000 mcg) by mouth once daily.       pantoprazole 40 mg EC tablet  Commonly known as: ProtoNix  Start taking on: April 25, 2024      Take 1 tablet (40 mg) by mouth once daily in the morning. Take before meals. Do not crush, chew, or split.              CHANGE how you take these medications        Instructions Last Dose Given Next Dose Due   ondansetron 4 mg tablet  Commonly known as: Zofran  What changed: Another medication with the same name was added. Make sure you understand how and when to take each.           ondansetron 4 mg tablet  Commonly known as: Zofran  What changed: You were already taking a medication with the same name, and this prescription was added. Make sure you understand how and when to take each.      Take 1 tablet (4 mg) by mouth every 8 hours if needed for nausea or vomiting.              CONTINUE taking these medications        Instructions Last Dose Given Next Dose Due   Advair Diskus 100-50 mcg/dose diskus inhaler  Generic drug: fluticasone propion-salmeteroL           ascorbic acid 500 mg tablet  Commonly known as: Vitamin C           atorvastatin 20 mg tablet  Commonly known as: Lipitor           bisacodyl 5 mg EC tablet  Commonly known as: Dulcolax           cholecalciferol 25 MCG (1000 UT) tablet  Commonly known as: Vitamin D-3           HYDROcodone-acetaminophen 5-325 mg tablet  Commonly known as: Norco      Take 1 tablet by mouth every 6 hours if needed for moderate pain (4 - 6) or severe pain (7 - 10) for up to 7 days.       losartan 50 mg tablet  Commonly known as: Cozaar           meclizine 12.5 mg tablet  Commonly known as: Antivert           Miralax 17 gram/dose  powder  Generic drug: polyethylene glycol           Nitrostat 0.4 mg SL tablet  Generic drug: nitroglycerin           tiotropium 18 mcg inhalation capsule  Commonly known as: Spiriva                  STOP taking these medications      aspirin 81 mg chewable tablet                  Where to Get Your Medications        These medications were sent to [facility]/pharmacy #[id number] - [place], [place] - [address] AT [place] [id number]      Phone: [phone]   cyanocobalamin 1,000 mcg tablet  ondansetron 4 mg tablet  pantoprazole 40 mg EC tablet         Test Results Pending At Discharge  Pending Labs       No current pending labs.            Hospital Course   89-year-old female who was admitted with painless hematochezia and acute on chronic CHIQUIS secondary to blood loss.  Patient came from acute rehab facility with a hemoglobin level of 6.3.  She was given 2 units of PRBCs.  Hemoglobin level stabilized to 7.8 and gastroenterology evaluated the patient.  Patient underwent colonoscopy on 4/23/2024 with Dr Albert. Colonoscopy which did find source of bleeding and required clipping. Hemostasis was achieved.  Patient is on aspirin and Xarelto.  Xarelto was started status post acute fracture of the right periprosthetic hip for DVT prophylaxis.  Patient on aspirin due to a history of CAD.  At this time risk greater than benefits for Xarelto use given patient acute bleeding.  GI okay with continuing aspirin however patient and family choosing to defer that also due to presence of angio ectasia along the colon that was the bleeding source.  Patient at this time is medically stable for discharge back to  acute rehab. On the day of discharge Hemoglobin level stable at 8.1.     On the day of discharge patient hemodynamically stable    A total of 36 minutes was spent on this discharge    Plan of care was discussed extensively with patient. Patient verbalized understanding through teach back method. All  questions and concerns addressed upon examination.     Of note, this documentation is completed using the Dragon Dictation system (voice recognition software). There may be spelling and/or grammatical errors that were not corrected prior to final submission.    Thank you for allow us to care for you at [facility].  If you have any questions or concerns while reading your discharge instructions, please feel free to contact your provider at [phone].  We wish you a speedy recovery!     Pertinent Physical Exam At Time of Discharge  Physical Exam  Vitals reviewed.   Constitutional:       Appearance: Normal appearance. She is normal weight.   HENT:      Head: Normocephalic and atraumatic.      Mouth/Throat:      Mouth: Mucous membranes are moist.   Eyes:      Extraocular Movements: Extraocular movements intact.      Pupils: Pupils are equal, round, and reactive to light.   Cardiovascular:      Rate and Rhythm: Normal rate and regular rhythm.      Pulses: Normal pulses.      Heart sounds: Normal heart sounds. No murmur heard.     No gallop.   Pulmonary:      Effort: Pulmonary effort is normal. No respiratory distress.      Breath sounds: Normal breath sounds. No wheezing, rhonchi or rales.   Abdominal:      General: Abdomen is flat. Bowel sounds are normal. There is no distension.      Palpations: Abdomen is soft. There is no mass.      Tenderness: There is no abdominal tenderness. There is no rebound.      Hernia: No hernia is present.   Musculoskeletal:         General: No swelling, tenderness or signs of injury. Normal range of motion.      Cervical back: Normal range of motion and neck supple.      Right lower leg: No edema.      Left lower leg: No edema.   Skin:     General: Skin is warm and dry.      Capillary Refill: Capillary refill takes less than 2 seconds.      Findings: Bruising present. No erythema or rash.   Neurological:      General: No focal deficit present.      Mental Status: She is alert and oriented  to person, place, and time.      Cranial Nerves: No cranial nerve deficit.      Sensory: No sensory deficit.      Motor: No weakness.   Psychiatric:         Mood and Affect: Mood normal.         Behavior: Behavior normal.         Outpatient Follow-Up  Future Appointments   Date Time Provider Department Center   5/9/2024  1:00 PM Chente Nathan MD [id number] [place]   6/19/2024 10:00 AM ELY [id number] ECHO/VASC 4 [id number] [facility]   6/26/2024 10:00 AM Jose Harris MD [id number] [facility]         Farhana Peter, APRN-CNP

## 2024-04-24 NOTE — PROGRESS NOTES
Discharge orders received. Notified [facility] . Family to transport pt to [facility] acute rehab this evening. Pt updated, pt & family in agreement with plan.

## 2024-04-24 NOTE — PROGRESS NOTES
Patient seen and examined today.  No reported symptoms of bleeding.  Has been afebrile.  No nausea or emesis.  No chest pain or shortness of breath.  Hemoglobin level stable at 8.1.  Patient to only be on aspirin on discharge.  No further Xarelto therapy advised.  Patient's status post colonoscopy which did find source of bleeding and required clipping.  Hemostasis was achieved.  Discussed discharge planning with patient, advised patient that she will return to [facility] acute rehab to complete her orthopedic treatment plan and then discharged to home from there.  She stated clear understanding.  Patient at this time is medically stable for discharge once gastroenterology provides clearance for discharge.  Anticipate discharge over the next 24 hours.

## 2024-05-03 NOTE — ED PROCEDURE NOTE
Procedure  Critical Care    Performed by: Israel Neves DO  Authorized by: Israel Neves DO    Critical care provider statement:     Critical care time (minutes):  34    Critical care time was exclusive of:  Separately billable procedures and treating other patients    Critical care was necessary to treat or prevent imminent or life-threatening deterioration of the following conditions:  Circulatory failure (GI Bleed, anemia)    Critical care was time spent personally by me on the following activities:  Development of treatment plan with patient or surrogate, evaluation of patient's response to treatment, examination of patient, re-evaluation of patient's condition, pulse oximetry, ordering and review of radiographic studies, ordering and review of laboratory studies, ordering and performing treatments and interventions and review of old charts    Care discussed with: admitting provider                 Israel Neves DO  05/03/24 5013

## 2024-05-09 ENCOUNTER — APPOINTMENT (OUTPATIENT)
Dept: ORTHOPEDIC SURGERY | Facility: CLINIC | Age: 89
End: 2024-05-09
Payer: MEDICARE

## 2024-05-16 ENCOUNTER — APPOINTMENT (OUTPATIENT)
Dept: ORTHOPEDIC SURGERY | Facility: CLINIC | Age: 89
End: 2024-05-16
Payer: MEDICARE

## 2024-05-22 ENCOUNTER — CLINICAL SUPPORT (OUTPATIENT)
Dept: ORTHOPEDIC SURGERY | Facility: CLINIC | Age: 89
End: 2024-05-22
Payer: MEDICARE

## 2024-05-22 ENCOUNTER — OFFICE VISIT (OUTPATIENT)
Dept: ORTHOPEDIC SURGERY | Facility: CLINIC | Age: 89
End: 2024-05-22
Payer: MEDICARE

## 2024-05-22 DIAGNOSIS — Z96.649 PERIPROSTHETIC FRACTURE OF HIP, SEQUELA: ICD-10-CM

## 2024-05-22 DIAGNOSIS — M97.8XXS PERIPROSTHETIC FRACTURE OF HIP, SEQUELA: ICD-10-CM

## 2024-05-22 DIAGNOSIS — M25.551 PAIN OF RIGHT HIP: ICD-10-CM

## 2024-05-22 PROCEDURE — 1159F MED LIST DOCD IN RCRD: CPT | Performed by: ORTHOPAEDIC SURGERY

## 2024-05-22 PROCEDURE — 99024 POSTOP FOLLOW-UP VISIT: CPT | Performed by: ORTHOPAEDIC SURGERY

## 2024-05-22 PROCEDURE — 73502 X-RAY EXAM HIP UNI 2-3 VIEWS: CPT | Mod: RIGHT SIDE | Performed by: ORTHOPAEDIC SURGERY

## 2024-05-22 PROCEDURE — 1111F DSCHRG MED/CURRENT MED MERGE: CPT | Performed by: ORTHOPAEDIC SURGERY

## 2024-05-22 PROCEDURE — 1157F ADVNC CARE PLAN IN RCRD: CPT | Performed by: ORTHOPAEDIC SURGERY

## 2024-05-22 NOTE — PROGRESS NOTES
History of present illness: Patient seen in the hospital for right greater trochanteric periprosthetic fracture    Patient has done well she is ambulated she is weightbearing she is more comfortable    Physical exam:    General: No acute distress or breathing difficulty or discomfort, pleasant and cooperative with the examination.    Extremities: Right incision clean and dry    Ecchymosis bruising gone    Mild pain with abduction    Leg lengths are symmetric    She can straight leg raise    Really no pain over the hip or groin area for ambulating and weightbearing    Diagnostic studies: 2 view show a minimally displaced trochanteric process fracture around a stable implant with a small avulsion of the lesser trochanteric process right hip    Impression: Stable total hip    Periprosthetic greater trochanteric fracture    Plan: Weight-bear as tolerated    No abduction exercises    Walker at all times    Fall prevention    Follow-up 6 to 8 weeks with AP lateral 2 views right hip

## 2024-06-19 ENCOUNTER — HOSPITAL ENCOUNTER (OUTPATIENT)
Dept: CARDIOLOGY | Facility: CLINIC | Age: 89
Discharge: HOME | End: 2024-06-19
Payer: MEDICARE

## 2024-06-19 DIAGNOSIS — I50.32 CHRONIC DIASTOLIC (CONGESTIVE) HEART FAILURE (MULTI): ICD-10-CM

## 2024-06-19 DIAGNOSIS — R09.89 PULMONARY HYPERINFLATION: ICD-10-CM

## 2024-06-19 PROCEDURE — 93306 TTE W/DOPPLER COMPLETE: CPT | Performed by: INTERNAL MEDICINE

## 2024-06-19 PROCEDURE — 93306 TTE W/DOPPLER COMPLETE: CPT

## 2024-06-20 LAB
AORTIC VALVE MEAN GRADIENT: 13 MMHG
AORTIC VALVE PEAK VELOCITY: 2.47 M/S
AV PEAK GRADIENT: 24.4 MMHG
AVA (PEAK VEL): 1.72 CM2
AVA (VTI): 1.86 CM2
EJECTION FRACTION APICAL 4 CHAMBER: 72.5
LEFT VENTRICLE INTERNAL DIMENSION DIASTOLE: 3.86 CM (ref 3.5–6)
LEFT VENTRICULAR OUTFLOW TRACT DIAMETER: 2.3 CM
MITRAL VALVE E/A RATIO: 1.09
RIGHT VENTRICLE PEAK SYSTOLIC PRESSURE: 19.3 MMHG

## 2024-06-26 ENCOUNTER — APPOINTMENT (OUTPATIENT)
Dept: CARDIOLOGY | Facility: CLINIC | Age: 89
End: 2024-06-26
Payer: MEDICARE

## 2024-06-26 VITALS
SYSTOLIC BLOOD PRESSURE: 110 MMHG | BODY MASS INDEX: 21.54 KG/M2 | HEART RATE: 77 BPM | DIASTOLIC BLOOD PRESSURE: 70 MMHG | WEIGHT: 110.3 LBS

## 2024-06-26 DIAGNOSIS — Z87.891 FORMER SMOKER: ICD-10-CM

## 2024-06-26 DIAGNOSIS — Z95.2 S/P TAVR (TRANSCATHETER AORTIC VALVE REPLACEMENT): ICD-10-CM

## 2024-06-26 DIAGNOSIS — J44.9 CHRONIC OBSTRUCTIVE PULMONARY DISEASE, UNSPECIFIED COPD TYPE (MULTI): ICD-10-CM

## 2024-06-26 DIAGNOSIS — I25.10 CAD S/P PERCUTANEOUS CORONARY ANGIOPLASTY: ICD-10-CM

## 2024-06-26 DIAGNOSIS — I05.0 MITRAL VALVE STENOSIS, UNSPECIFIED ETIOLOGY: ICD-10-CM

## 2024-06-26 DIAGNOSIS — I10 BENIGN ESSENTIAL HTN: ICD-10-CM

## 2024-06-26 DIAGNOSIS — Z98.61 CAD S/P PERCUTANEOUS CORONARY ANGIOPLASTY: ICD-10-CM

## 2024-06-26 DIAGNOSIS — E78.2 MIXED HYPERLIPIDEMIA: ICD-10-CM

## 2024-06-26 PROBLEM — I51.9 HEART DISEASE: Status: RESOLVED | Noted: 2023-10-16 | Resolved: 2024-06-26

## 2024-06-26 PROCEDURE — 1036F TOBACCO NON-USER: CPT | Performed by: INTERNAL MEDICINE

## 2024-06-26 PROCEDURE — 3078F DIAST BP <80 MM HG: CPT | Performed by: INTERNAL MEDICINE

## 2024-06-26 PROCEDURE — 1159F MED LIST DOCD IN RCRD: CPT | Performed by: INTERNAL MEDICINE

## 2024-06-26 PROCEDURE — 99214 OFFICE O/P EST MOD 30 MIN: CPT | Performed by: INTERNAL MEDICINE

## 2024-06-26 PROCEDURE — 3074F SYST BP LT 130 MM HG: CPT | Performed by: INTERNAL MEDICINE

## 2024-06-26 PROCEDURE — 1157F ADVNC CARE PLAN IN RCRD: CPT | Performed by: INTERNAL MEDICINE

## 2024-06-26 RX ORDER — LOSARTAN POTASSIUM 25 MG/1
25 TABLET ORAL NIGHTLY
Qty: 90 TABLET | Refills: 3 | Status: SHIPPED | OUTPATIENT
Start: 2024-06-26 | End: 2025-06-26

## 2024-06-26 RX ORDER — NITROGLYCERIN 0.4 MG/1
0.4 TABLET SUBLINGUAL EVERY 5 MIN PRN
Qty: 25 TABLET | Refills: 5 | Status: SHIPPED | OUTPATIENT
Start: 2024-06-26 | End: 2025-06-26

## 2024-06-26 RX ORDER — BISMUTH SUBSALICYLATE 262 MG
1 TABLET,CHEWABLE ORAL DAILY
COMMUNITY

## 2024-06-26 NOTE — LETTER
June 26, 2024       No Recipients    Patient: Gracie Carlsno   YOB: 1928   Date of Visit: 6/26/2024       Dear Dr. Key Recipients:    Thank you for referring Gracie Carlson to me for evaluation. Below are my notes for this consultation.  If you have questions, please do not hesitate to call me. I look forward to following your patient along with you.       Sincerely,     Jose Harris MD      CC:   No Recipients  ______________________________________________________________________________________    CARDIOLOGY OFFICE VISIT      CHIEF COMPLAINT      HISTORY OF PRESENT ILLNESS  The patient states she seems to be doing well from a cardiac standpoint.  She did have a fall couple months ago and apparently has some cracking in her hip.  She has been using a walker since then.  She has not had any further falls.  She denies chest discomfort or symptoms of myocardial ischemia.  She has not used nitroglycerin.  She denies any significant problem with dyspnea.  She denies palpitations and syncope.  She denies any problem with her current medication.  I did go over results of her current echocardiogram with her.  Her left ventricular systolic function is normal.    IMPRESSION:   1. Coronary artery disease, no angina.  2. Remote PCI.  3. Mixed hyperlipidemia.  4. TAVR with #23 Sutton valve in 2015 at [facility] by Dr. Dr. Ames.  5. COPD  6. Former smoker  7. Hypertension  8. Mitral stenosis, moderate  9.  Mitral regurgitation, moderate  10.  Aortic regurgitation, mild     Please excuse any errors in grammar or translation related to this dictation. Voice recognition software was utilized to prepare this document.     Past Medical History  Past Medical History:   Diagnosis Date   • Hypothyroidism, unspecified 01/15/2016    Hypothyroidism   • Personal history of diseases of the blood and blood-forming organs and certain disorders involving the immune mechanism 11/13/2015    History of anemia   •  Personal history of other diseases of the circulatory system 01/15/2016    History of coronary atherosclerosis   • Personal history of other diseases of the circulatory system 01/15/2016    History of hypertension   • Personal history of other diseases of the musculoskeletal system and connective tissue 11/13/2015    History of rheumatoid arthritis   • Pulmonary hypertension, unspecified (Multi) 01/15/2016    Pulmonary hypertension       Social History  Social History     Tobacco Use   • Smoking status: Former     Types: Cigarettes   • Smokeless tobacco: Never   Substance Use Topics   • Alcohol use: Never   • Drug use: Never       Family History     Family History   Problem Relation Name Age of Onset   • Hypertension Mother     • Heart disease Father          RHEUMATIC   • Hypertension Father     • Coronary artery disease Father     • Other (ENDOCARDITIS) Father          Allergies:  Allergies   Allergen Reactions   • Oxycodone Unknown   • Sulfa (Sulfonamide Antibiotics) Unknown        Outpatient Medications:  Current Outpatient Medications   Medication Instructions   • ascorbic acid (VITAMIN C) 500 mg, oral, Daily   • atorvastatin (Lipitor) 20 mg tablet 1 tablet, oral, Nightly   • bisacodyl (Dulcolax) 5 mg EC tablet 1 tablet, oral, As needed   • fluticasone propion-salmeteroL (Advair Diskus) 100-50 mcg/dose diskus inhaler 1 puff, inhalation, 2 times daily, Rinse mouth with water after use to reduce aftertaste and incidence of candidiasis. Do not swallow.   • fluticasone/umeclidin/vilanter (TRELEGY ELLIPTA INHL) 1 puff, inhalation, Daily   • losartan (Cozaar) 50 mg tablet 1 tablet, oral, Nightly   • meclizine (Antivert) 12.5 mg tablet 1 tablet, oral, 3 times daily PRN   • nitroglycerin (Nitrostat) 0.4 mg SL tablet 1 tablet, sublingual, Every 5 min PRN   • ondansetron (ZOFRAN) 4 mg, oral, Every 8 hours PRN   • ondansetron (ZOFRAN) 4 mg, oral, Every 8 hours PRN   • pantoprazole (PROTONIX) 40 mg, oral, Daily before  breakfast, Do not crush, chew, or split.   • polyethylene glycol (MIRALAX) 17 g, oral, Daily PRN   • tiotropium (Spiriva) 18 mcg inhalation capsule 1 capsule, inhalation, As needed          REVIEW OF SYSTEMS  Review of Systems   Constitutional:        Rolling walker for assistance    All other systems reviewed and are negative.        VITALS  Vitals:    06/26/24 1020   BP: 110/70   Pulse: 77       PHYSICAL EXAM  Vitals reviewed.   Constitutional:       Appearance: Normal and healthy appearance. Well-developed and not in distress.   Eyes:      Conjunctiva/sclera: Conjunctivae normal.      Pupils: Pupils are equal, round, and reactive to light.   Neck:      Vascular: No JVR. JVD normal.   Pulmonary:      Effort: Pulmonary effort is normal.      Breath sounds: Normal breath sounds. No wheezing. No rhonchi. No rales.   Chest:      Chest wall: Not tender to palpatation.   Cardiovascular:      PMI at left midclavicular line. Normal rate. Regular rhythm. Normal S1. Normal S2.       Murmurs: There is a grade 1/6 systolic murmur at the back.      No gallop.  No click. No rub.   Pulses:     Intact distal pulses.   Edema:     Peripheral edema absent.   Abdominal:      Tenderness: There is no abdominal tenderness.   Musculoskeletal: Normal range of motion.         General: No tenderness.      Cervical back: Normal range of motion. Skin:     General: Skin is warm and dry.   Neurological:      General: No focal deficit present.      Mental Status: Alert and oriented to person, place and time.   Psychiatric:         Behavior: Behavior is cooperative.           ASSESSMENT AND PLAN  Diagnoses and all orders for this visit:  CAD S/P percutaneous coronary angioplasty  Mixed hyperlipidemia  S/P TAVR (transcatheter aortic valve replacement)  Chronic obstructive pulmonary disease, unspecified COPD type (Multi)  Benign essential HTN  Mitral valve stenosis, unspecified etiology  BMI 21.0-21.9, adult  Former  smoker      [unfilled]

## 2024-06-26 NOTE — PROGRESS NOTES
CARDIOLOGY OFFICE VISIT      CHIEF COMPLAINT      HISTORY OF PRESENT ILLNESS  The patient states she seems to be doing well from a cardiac standpoint.  She did have a fall couple months ago and apparently has some cracking in her hip.  She has been using a walker since then.  She has not had any further falls.  She denies chest discomfort or symptoms of myocardial ischemia.  She has not used nitroglycerin.  She denies any significant problem with dyspnea.  She denies palpitations and syncope.  She denies any problem with her current medication.  I did go over results of her current echocardiogram with her.  Her left ventricular systolic function is normal.    IMPRESSION:   1. Coronary artery disease, no angina.  2. Remote PCI.  3. Mixed hyperlipidemia.  4. TAVR with #23 Sutton valve in 2015 at [facility] by Dr. Dr. Ames.  5. COPD  6. Former smoker  7. Hypertension  8. Mitral stenosis, moderate  9.  Mitral regurgitation, moderate  10.  Aortic regurgitation, mild     Please excuse any errors in grammar or translation related to this dictation. Voice recognition software was utilized to prepare this document.     Past Medical History  Past Medical History:   Diagnosis Date    Hypothyroidism, unspecified 01/15/2016    Hypothyroidism    Personal history of diseases of the blood and blood-forming organs and certain disorders involving the immune mechanism 11/13/2015    History of anemia    Personal history of other diseases of the circulatory system 01/15/2016    History of coronary atherosclerosis    Personal history of other diseases of the circulatory system 01/15/2016    History of hypertension    Personal history of other diseases of the musculoskeletal system and connective tissue 11/13/2015    History of rheumatoid arthritis    Pulmonary hypertension, unspecified (Multi) 01/15/2016    Pulmonary hypertension       Social History  Social History     Tobacco Use    Smoking status: Former     Types: Cigarettes     Smokeless tobacco: Never   Substance Use Topics    Alcohol use: Never    Drug use: Never       Family History     Family History   Problem Relation Name Age of Onset    Hypertension Mother      Heart disease Father          RHEUMATIC    Hypertension Father      Coronary artery disease Father      Other (ENDOCARDITIS) Father          Allergies:  Allergies   Allergen Reactions    Oxycodone Unknown    Sulfa (Sulfonamide Antibiotics) Unknown        Outpatient Medications:  Current Outpatient Medications   Medication Instructions    ascorbic acid (VITAMIN C) 500 mg, oral, Daily    atorvastatin (Lipitor) 20 mg tablet 1 tablet, oral, Nightly    bisacodyl (Dulcolax) 5 mg EC tablet 1 tablet, oral, As needed    fluticasone propion-salmeteroL (Advair Diskus) 100-50 mcg/dose diskus inhaler 1 puff, inhalation, 2 times daily, Rinse mouth with water after use to reduce aftertaste and incidence of candidiasis. Do not swallow.    fluticasone/umeclidin/vilanter (TRELEGY ELLIPTA INHL) 1 puff, inhalation, Daily    losartan (Cozaar) 50 mg tablet 1 tablet, oral, Nightly    meclizine (Antivert) 12.5 mg tablet 1 tablet, oral, 3 times daily PRN    nitroglycerin (Nitrostat) 0.4 mg SL tablet 1 tablet, sublingual, Every 5 min PRN    ondansetron (ZOFRAN) 4 mg, oral, Every 8 hours PRN    ondansetron (ZOFRAN) 4 mg, oral, Every 8 hours PRN    pantoprazole (PROTONIX) 40 mg, oral, Daily before breakfast, Do not crush, chew, or split.    polyethylene glycol (MIRALAX) 17 g, oral, Daily PRN    tiotropium (Spiriva) 18 mcg inhalation capsule 1 capsule, inhalation, As needed          REVIEW OF SYSTEMS  Review of Systems   Constitutional:        Rolling walker for assistance    All other systems reviewed and are negative.        VITALS  Vitals:    06/26/24 1020   BP: 110/70   Pulse: 77       PHYSICAL EXAM  Vitals reviewed.   Constitutional:       Appearance: Normal and healthy appearance. Well-developed and not in distress.   Eyes:      Conjunctiva/sclera:  Conjunctivae normal.      Pupils: Pupils are equal, round, and reactive to light.   Neck:      Vascular: No JVR. JVD normal.   Pulmonary:      Effort: Pulmonary effort is normal.      Breath sounds: Normal breath sounds. No wheezing. No rhonchi. No rales.   Chest:      Chest wall: Not tender to palpatation.   Cardiovascular:      PMI at left midclavicular line. Normal rate. Regular rhythm. Normal S1. Normal S2.       Murmurs: There is a grade 1/6 systolic murmur at the back.      No gallop.  No click. No rub.   Pulses:     Intact distal pulses.   Edema:     Peripheral edema absent.   Abdominal:      Tenderness: There is no abdominal tenderness.   Musculoskeletal: Normal range of motion.         General: No tenderness.      Cervical back: Normal range of motion. Skin:     General: Skin is warm and dry.   Neurological:      General: No focal deficit present.      Mental Status: Alert and oriented to person, place and time.   Psychiatric:         Behavior: Behavior is cooperative.           ASSESSMENT AND PLAN  Diagnoses and all orders for this visit:  CAD S/P percutaneous coronary angioplasty  Mixed hyperlipidemia  S/P TAVR (transcatheter aortic valve replacement)  Chronic obstructive pulmonary disease, unspecified COPD type (Multi)  Benign essential HTN  Mitral valve stenosis, unspecified etiology  BMI 21.0-21.9, adult  Former smoker      [unfilled]

## 2024-06-26 NOTE — PATIENT INSTRUCTIONS
Patient to follow up in 1 year with Dr. Jose Niño MD      Office will repeat Echo in 1 year before your next visit.     Please REDUCE Losartan to 25mg once daily at bedtime. You can split your current 50mg tablets in half to use those up first.   Your next script will be for the 25mg version.     No changes today.   Continue same medications and treatments.   Patient educated on proper medication use.   Patient educated on risk factor modification.   Please bring any lab results from other providers / physicians to your next appointment.     Please bring all medicines, vitamins, and herbal supplements with you when you come to the office.     Prescriptions will not be filled unless you are compliant with your follow up appointments or have a follow up appointment scheduled as per instruction of your physician. Refills should be requested at the time of your visit.    IJw RN am scribing for and in the presence of Dr. Jose Harris MD

## 2024-07-10 ENCOUNTER — APPOINTMENT (OUTPATIENT)
Dept: ORTHOPEDIC SURGERY | Facility: CLINIC | Age: 89
End: 2024-07-10
Payer: MEDICARE

## 2024-07-17 ENCOUNTER — APPOINTMENT (OUTPATIENT)
Dept: ORTHOPEDIC SURGERY | Facility: CLINIC | Age: 89
End: 2024-07-17
Payer: MEDICARE

## 2024-07-17 ENCOUNTER — CLINICAL SUPPORT (OUTPATIENT)
Dept: ORTHOPEDIC SURGERY | Facility: CLINIC | Age: 89
End: 2024-07-17
Payer: MEDICARE

## 2024-07-17 DIAGNOSIS — M97.8XXS PERIPROSTHETIC FRACTURE OF HIP, SEQUELA: ICD-10-CM

## 2024-07-17 DIAGNOSIS — M25.551 PAIN OF RIGHT HIP: ICD-10-CM

## 2024-07-17 DIAGNOSIS — Z96.649 PERIPROSTHETIC FRACTURE OF HIP, SEQUELA: ICD-10-CM

## 2024-07-17 PROCEDURE — 73502 X-RAY EXAM HIP UNI 2-3 VIEWS: CPT | Mod: RIGHT SIDE | Performed by: ORTHOPAEDIC SURGERY

## 2024-07-17 PROCEDURE — 1159F MED LIST DOCD IN RCRD: CPT | Performed by: ORTHOPAEDIC SURGERY

## 2024-07-17 PROCEDURE — 99213 OFFICE O/P EST LOW 20 MIN: CPT | Performed by: ORTHOPAEDIC SURGERY

## 2024-07-17 PROCEDURE — 1157F ADVNC CARE PLAN IN RCRD: CPT | Performed by: ORTHOPAEDIC SURGERY

## 2024-07-17 PROCEDURE — 1036F TOBACCO NON-USER: CPT | Performed by: ORTHOPAEDIC SURGERY

## 2024-07-17 NOTE — PROGRESS NOTES
History of present illness: Greater trochanteric periprosthetic hip fracture right    Physical exam:    General: No acute distress or breathing difficulty or discomfort, pleasant and cooperative with the examination.    Extremities: Right hip moves freely    Incision clean and dry    No pain over the greater trochanteric process    She can stand now 1 leg with minimal support mild weakness of the abductors    She can flex to 90 abduct to 30 no pain with logrolling she can straight leg raise she can plantarflex dorsiflex toes foot and ankle she has no obvious back sign or deformity there is no obvious foreshortening    Diagnostic studies: X-rays show well-positioned total hip replacement with a healed greater trochanteric periprosthetic fracture    Impression: Healed greater trochanteric periprosthetic fracture    Plan: Total hip implant 20 years out but doing well-healed periprosthetic fracture resume low up activities recommend a walker or cane at all times for fall prevention

## 2024-11-19 ENCOUNTER — HOSPITAL ENCOUNTER (EMERGENCY)
Facility: HOSPITAL | Age: 89
Discharge: HOME | End: 2024-11-19
Attending: EMERGENCY MEDICINE
Payer: MEDICARE

## 2024-11-19 ENCOUNTER — APPOINTMENT (OUTPATIENT)
Dept: RADIOLOGY | Facility: HOSPITAL | Age: 89
End: 2024-11-19
Payer: MEDICARE

## 2024-11-19 ENCOUNTER — APPOINTMENT (OUTPATIENT)
Dept: CARDIOLOGY | Facility: HOSPITAL | Age: 89
End: 2024-11-19
Payer: MEDICARE

## 2024-11-19 VITALS
HEIGHT: 66 IN | SYSTOLIC BLOOD PRESSURE: 174 MMHG | RESPIRATION RATE: 25 BRPM | BODY MASS INDEX: 17.36 KG/M2 | OXYGEN SATURATION: 89 % | DIASTOLIC BLOOD PRESSURE: 67 MMHG | TEMPERATURE: 97.3 F | HEART RATE: 66 BPM | WEIGHT: 108 LBS

## 2024-11-19 DIAGNOSIS — S09.90XA INJURY OF HEAD, INITIAL ENCOUNTER: Primary | ICD-10-CM
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
## 2024-11-19 DIAGNOSIS — S01.01XA LACERATION OF SCALP, INITIAL ENCOUNTER: ICD-10-CM

## 2024-11-19 LAB
ABO GROUP (TYPE) IN BLOOD: NORMAL
ALBUMIN SERPL BCP-MCNC: 3.7 G/DL (ref 3.4–5)
ALP SERPL-CCNC: 65 U/L (ref 33–136)
ALT SERPL W P-5'-P-CCNC: 19 U/L (ref 7–45)
ANION GAP SERPL CALC-SCNC: 10 MMOL/L (ref 10–20)
ANTIBODY SCREEN: NORMAL
AST SERPL W P-5'-P-CCNC: 36 U/L (ref 9–39)
ATRIAL RATE: 77 BPM
BASOPHILS # BLD AUTO: 0.05 X10*3/UL (ref 0–0.1)
BASOPHILS NFR BLD AUTO: 0.6 %
BILIRUB SERPL-MCNC: 0.9 MG/DL (ref 0–1.2)
BUN SERPL-MCNC: 20 MG/DL (ref 6–23)
CALCIUM SERPL-MCNC: 8.8 MG/DL (ref 8.6–10.3)
CARDIAC TROPONIN I PNL SERPL HS: 10 NG/L (ref 0–13)
CHLORIDE SERPL-SCNC: 102 MMOL/L (ref 98–107)
CO2 SERPL-SCNC: 26 MMOL/L (ref 21–32)
CREAT SERPL-MCNC: 0.91 MG/DL (ref 0.5–1.05)
EGFRCR SERPLBLD CKD-EPI 2021: 58 ML/MIN/1.73M*2
EOSINOPHIL # BLD AUTO: 0.26 X10*3/UL (ref 0–0.4)
EOSINOPHIL NFR BLD AUTO: 3.2 %
ERYTHROCYTE [DISTWIDTH] IN BLOOD BY AUTOMATED COUNT: 13.6 % (ref 11.5–14.5)
ETHANOL SERPL-MCNC: <10 MG/DL
GLUCOSE SERPL-MCNC: 77 MG/DL (ref 74–99)
HCT VFR BLD AUTO: 32.9 % (ref 36–46)
HGB BLD-MCNC: 10.7 G/DL (ref 12–16)
IMM GRANULOCYTES # BLD AUTO: 0.02 X10*3/UL (ref 0–0.5)
IMM GRANULOCYTES NFR BLD AUTO: 0.2 % (ref 0–0.9)
INR PPP: 1.1 (ref 0.9–1.1)
LACTATE SERPL-SCNC: 1.1 MMOL/L (ref 0.4–2)
LYMPHOCYTES # BLD AUTO: 1.08 X10*3/UL (ref 0.8–3)
LYMPHOCYTES NFR BLD AUTO: 13.2 %
MCH RBC QN AUTO: 28.6 PG (ref 26–34)
MCHC RBC AUTO-ENTMCNC: 32.5 G/DL (ref 32–36)
MCV RBC AUTO: 88 FL (ref 80–100)
MONOCYTES # BLD AUTO: 0.65 X10*3/UL (ref 0.05–0.8)
MONOCYTES NFR BLD AUTO: 7.9 %
NEUTROPHILS # BLD AUTO: 6.15 X10*3/UL (ref 1.6–5.5)
NEUTROPHILS NFR BLD AUTO: 74.9 %
NRBC BLD-RTO: 0 /100 WBCS (ref 0–0)
P AXIS: 119 DEGREES
P OFFSET: 193 MS
P ONSET: 170 MS
PLATELET # BLD AUTO: 172 X10*3/UL (ref 150–450)
POTASSIUM SERPL-SCNC: 5.2 MMOL/L (ref 3.5–5.3)
PR INTERVAL: 116 MS
PROT SERPL-MCNC: 6.2 G/DL (ref 6.4–8.2)
PROTHROMBIN TIME: 11.9 SECONDS (ref 9.8–12.8)
Q ONSET: 228 MS
QRS COUNT: 13 BEATS
QRS DURATION: 66 MS
QT INTERVAL: 354 MS
QTC CALCULATION(BAZETT): 400 MS
QTC FREDERICIA: 384 MS
R AXIS: -39 DEGREES
RBC # BLD AUTO: 3.74 X10*6/UL (ref 4–5.2)
RH FACTOR (ANTIGEN D): NORMAL
SODIUM SERPL-SCNC: 133 MMOL/L (ref 136–145)
T AXIS: 37 DEGREES
T OFFSET: 405 MS
VENTRICULAR RATE: 77 BPM
WBC # BLD AUTO: 8.2 X10*3/UL (ref 4.4–11.3)

## 2024-11-19 PROCEDURE — 2500000005 HC RX 250 GENERAL PHARMACY W/O HCPCS: Performed by: EMERGENCY MEDICINE

## 2024-11-19 PROCEDURE — 73090 X-RAY EXAM OF FOREARM: CPT | Mod: RIGHT SIDE | Performed by: RADIOLOGY

## 2024-11-19 PROCEDURE — 99285 EMERGENCY DEPT VISIT HI MDM: CPT | Mod: 25

## 2024-11-19 PROCEDURE — 36415 COLL VENOUS BLD VENIPUNCTURE: CPT | Performed by: EMERGENCY MEDICINE

## 2024-11-19 PROCEDURE — 72125 CT NECK SPINE W/O DYE: CPT | Performed by: RADIOLOGY

## 2024-11-19 PROCEDURE — 83605 ASSAY OF LACTIC ACID: CPT | Performed by: EMERGENCY MEDICINE

## 2024-11-19 PROCEDURE — 12001 RPR S/N/AX/GEN/TRNK 2.5CM/<: CPT

## 2024-11-19 PROCEDURE — 73090 X-RAY EXAM OF FOREARM: CPT | Mod: RT

## 2024-11-19 PROCEDURE — 93005 ELECTROCARDIOGRAM TRACING: CPT

## 2024-11-19 PROCEDURE — 70450 CT HEAD/BRAIN W/O DYE: CPT | Performed by: RADIOLOGY

## 2024-11-19 PROCEDURE — 80053 COMPREHEN METABOLIC PANEL: CPT | Performed by: EMERGENCY MEDICINE

## 2024-11-19 PROCEDURE — 70450 CT HEAD/BRAIN W/O DYE: CPT

## 2024-11-19 PROCEDURE — 71045 X-RAY EXAM CHEST 1 VIEW: CPT

## 2024-11-19 PROCEDURE — 82077 ASSAY SPEC XCP UR&BREATH IA: CPT | Performed by: EMERGENCY MEDICINE

## 2024-11-19 PROCEDURE — 90471 IMMUNIZATION ADMIN: CPT | Performed by: EMERGENCY MEDICINE

## 2024-11-19 PROCEDURE — 90715 TDAP VACCINE 7 YRS/> IM: CPT | Performed by: EMERGENCY MEDICINE

## 2024-11-19 PROCEDURE — 2500000004 HC RX 250 GENERAL PHARMACY W/ HCPCS (ALT 636 FOR OP/ED): Performed by: EMERGENCY MEDICINE

## 2024-11-19 PROCEDURE — 85610 PROTHROMBIN TIME: CPT | Performed by: EMERGENCY MEDICINE

## 2024-11-19 PROCEDURE — 71045 X-RAY EXAM CHEST 1 VIEW: CPT | Performed by: RADIOLOGY

## 2024-11-19 PROCEDURE — 86901 BLOOD TYPING SEROLOGIC RH(D): CPT | Performed by: EMERGENCY MEDICINE

## 2024-11-19 PROCEDURE — 85025 COMPLETE CBC W/AUTO DIFF WBC: CPT | Performed by: EMERGENCY MEDICINE

## 2024-11-19 PROCEDURE — 84484 ASSAY OF TROPONIN QUANT: CPT | Performed by: EMERGENCY MEDICINE

## 2024-11-19 PROCEDURE — 2500000001 HC RX 250 WO HCPCS SELF ADMINISTERED DRUGS (ALT 637 FOR MEDICARE OP): Performed by: EMERGENCY MEDICINE

## 2024-11-19 PROCEDURE — 72125 CT NECK SPINE W/O DYE: CPT

## 2024-11-19 RX ORDER — BACITRACIN 500 [USP'U]/G
OINTMENT TOPICAL ONCE
Status: COMPLETED | OUTPATIENT
Start: 2024-11-19 | End: 2024-11-19

## 2024-11-19 RX ADMIN — BACITRACIN: 500 OINTMENT TOPICAL at 12:36

## 2024-11-19 RX ADMIN — Medication 1 APPLICATION: at 12:08

## 2024-11-19 RX ADMIN — TETANUS TOXOID, REDUCED DIPHTHERIA TOXOID AND ACELLULAR PERTUSSIS VACCINE, ADSORBED 0.5 ML: 5; 2.5; 8; 8; 2.5 SUSPENSION INTRAMUSCULAR at 11:31

## 2024-11-19 ASSESSMENT — LIFESTYLE VARIABLES
EVER FELT BAD OR GUILTY ABOUT YOUR DRINKING: NO
EVER HAD A DRINK FIRST THING IN THE MORNING TO STEADY YOUR NERVES TO GET RID OF A HANGOVER: NO
HAVE YOU EVER FELT YOU SHOULD CUT DOWN ON YOUR DRINKING: NO
HAVE PEOPLE ANNOYED YOU BY CRITICIZING YOUR DRINKING: NO
TOTAL SCORE: 0

## 2024-11-19 ASSESSMENT — PAIN - FUNCTIONAL ASSESSMENT: PAIN_FUNCTIONAL_ASSESSMENT: 0-10

## 2024-11-19 ASSESSMENT — COLUMBIA-SUICIDE SEVERITY RATING SCALE - C-SSRS
1. IN THE PAST MONTH, HAVE YOU WISHED YOU WERE DEAD OR WISHED YOU COULD GO TO SLEEP AND NOT WAKE UP?: NO
6. HAVE YOU EVER DONE ANYTHING, STARTED TO DO ANYTHING, OR PREPARED TO DO ANYTHING TO END YOUR LIFE?: NO
2. HAVE YOU ACTUALLY HAD ANY THOUGHTS OF KILLING YOURSELF?: NO

## 2024-11-19 ASSESSMENT — PAIN SCALES - GENERAL: PAINLEVEL_OUTOF10: 0 - NO PAIN

## 2024-11-19 NOTE — ED PROVIDER NOTES
HPI   Chief Complaint   Patient presents with    Fall     Pt fell pta from standing position onto floor, hit back of head, denies LOC, denies thinners.  Pt has lac to back of head.  Pt aaox4.  Pt denies pain       HPI: 89-year-old female presents from home after she sustained a mechanical fall.  She states that she turned in her kitchen too fast fell backwards and hit her head.  She denies any LOC.  She denies any blood thinners.  She did sustain a laceration to the back of her head.  She denies any neck chest or abdominal pain.  No shortness of breath.  No dizziness or syncope.  Family is at the bedside.    Family HX: Denies any significant/pertinent family history.  Social Hx: Denies ETOH or drug use.  Review of systems:  Gen.: No weight loss, fatigue, anorexia, insomnia, fever.   Eyes: No vision loss, double vision, drainage, eye pain.   ENT: No pharyngitis, dry mouth.   Cardiac: No chest pain, palpitations, syncope, near syncope.   Pulmonary: No shortness of breath, cough, hemoptysis.   Heme/lymph: No swollen glands, fever, bleeding.   GI: No abdominal pain, change in bowel habits, melena, hematemesis, hematochezia, nausea, vomiting, diarrhea.   : No discharge, dysuria, frequency, urgency, hematuria.   Musculoskeletal: No limb pain, joint pain, joint swelling.   Skin: No rashes.   Psych: No depression, anxiety, suicidality, homicidality.   Review of systems is otherwise negative unless stated above or in history of present illness.    Physical Exam:    Appearance: Alert, oriented , cooperative,  in no acute distress. Well nourished & well hydrated.    Skin: 2 cm laceration back of the head without active bleeding dry skin, no lesions, rash, petechiae or purpura.     Eyes: PERRLA, EOMs intact,  Conjunctiva pink with no redness or exudates. Eyelids without lesions. No scleral icterus.     ENT: Hearing grossly intact. External auditory canals patent, tympanic membranes intact with visible landmarks. Nares patent,  mucus membranes moist. Dentition without lesions. Pharynx clear, uvula midline.     Neck: Supple, without meningismus. Thyroid not palpable. Trachea at midline. No lymphadenopathy.    Pulmonary: Clear bilaterally with good chest wall excursion. No rales, rhonchi or wheezing. No accessory muscle use or stridor.    Cardiac: Normal S1, S2 without murmur, rub, gallop or extrasystole. No JVD, Carotids without bruits.    Abdomen: Soft, nontender, active bowel sounds.  No palpable organomegaly.  No rebound or guarding.  No CVA tenderness.    Genitourinary: Exam deferred.    Musculoskeletal: Full range of motion. no pain, edema, or deformity. Pulses full and equal. No cyanosis, clubbing, or edema.    Neurological:  Cranial nerves II through XII are grossly intact, finger-nose touch is normal, normal sensation, no weakness, no focal findings identified.    Psychiatric: Appropriate mood and affect.     Medical Decision-Making:  Testing: EKG was obtained which is interpreted by me shows a sinus rhythm PACs PVCs rate of 77 without evidence of obvious ST elevations or T wave inversions some nonspecific ST-T wave changes.  Patient underwent a CT of the head C-spine which were read by radiology as age-related changes without acute intracranial process.  Redemonstration of marked cervical spondylosis without fracture.  She also had a chest x-ray done which shows obscured retrocardiac  region raising concern for possible effusion and/or airspace disease PA lateral chest could be considered for further evaluation if deemed clinically appropriate.  Patient is not having any chest pain.  She is not having any shortness of breath.  She is not hypoxic.  She is adamant about going home.  X-rays of the forearm where there was a skin tear also showed degenerative changes but no acute findings.  Family is at the bedside.  We had discussed placement as family stated that she had a fall about a week and a half ago as well however patient  currently is denying wanting to be admitted and patient was adamant about going home.  She currently has capacity.  She is awake alert and appropriate.  Family is at the bedside.  All questions were answered.  She was able to ambulate here with her walker with a narrow-base steady gait and no assistance.  Treatment: Labs were reviewed.  Patient has a white count of 8.2 without leukocytosis and hemoglobin of 10.7 which is improved compared to previous of 8.  Glucose is 77.  Electrolytes are otherwise unremarkable.  Sodium is 133 however patient has had sodiums in the 135 range most recently.  Alcohol level is negative.  Lactate is normal.  Troponin is negative.  Reevaluation:   Plan: Homegoing. Discussed differential. Will follow-up with the primary physician in the next 2-3 days. Return if worse. They understand return precautions and discharge instructions. Patient and family/friend/caregiver are in agreement with this plan.   Impression:   1.  Head injury  2.  Scalp laceration  Labs Reviewed  CBC WITH AUTO DIFFERENTIAL - Abnormal     WBC                           8.2                    nRBC                          0.0                    RBC                           3.74 (*)               Hemoglobin                    10.7 (*)               Hematocrit                    32.9 (*)               MCV                           88                     MCH                           28.6                   MCHC                          32.5                   RDW                           13.6                   Platelets                     172                    Neutrophils %                 74.9                   Immature Granulocytes %, Automated   0.2                    Lymphocytes %                 13.2                   Monocytes %                   7.9                    Eosinophils %                 3.2                    Basophils %                   0.6                    Neutrophils Absolute          6.15 (*)                Immature Granulocytes Absolute, Au*   0.02                   Lymphocytes Absolute          1.08                   Monocytes Absolute            0.65                   Eosinophils Absolute          0.26                   Basophils Absolute            0.05                COMPREHENSIVE METABOLIC PANEL - Abnormal     Glucose                       77                     Sodium                        133 (*)                Potassium                     5.2                    Chloride                      102                    Bicarbonate                   26                     Anion Gap                     10                     Urea Nitrogen                 20                     Creatinine                    0.91                   eGFR                          58 (*)                 Calcium                       8.8                    Albumin                       3.7                    Alkaline Phosphatase          65                     Total Protein                 6.2 (*)                AST                           36                     Bilirubin, Total              0.9                    ALT                           19                  ALCOHOL - Normal     Alcohol                       <10                 LACTATE - Normal     Lactate                       1.1                      Narrative: Venipuncture immediately after or during the administration of Metamizole may lead to falsely low results. Testing should be performed immediately prior to Metamizole dosing.  PROTIME-INR - Normal     Protime                       11.9                   INR                           1.1                 TROPONIN I, HIGH SENSITIVITY - Normal     Troponin I, High Sensitivity   10                       Narrative: Less than 99th percentile of normal range cutoff-                Female and children under 18 years old <14 ng/L; Male <21 ng/L: Negative                Repeat testing should be performed if clinically  indicated.                                 Female and children under 18 years old 14-50 ng/L; Male 21-50 ng/L:                Consistent with possible cardiac damage and possible increased clinical                 risk. Serial measurements may help to assess extent of myocardial damage.                                 >50 ng/L: Consistent with cardiac damage, increased clinical risk and                myocardial infarction. Serial measurements may help assess extent of                 myocardial damage.                                  NOTE: Children less than 1 year old may have higher baseline troponin                 levels and results should be interpreted in conjunction with the overall                 clinical context.                                 NOTE: Troponin I testing is performed using a different                 testing methodology at [facility] than at other                 [facility]. Direct result comparisons should only                 be made within the same method.  TYPE AND SCREEN     XR chest 1 view   Final Result    Obscured retrocardiac region raising concern for possible effusion    and/or airspace disease. PA and lateral radiographs of the chest    could be considered for further evaluation if deemed clinically    appropriate.          Signed by: Emiliano Ng 11/19/2024 11:20 AM    Dictation workstation:   [id number]     XR forearm right 2 views   Final Result    Degenerative changes. No acute findings right forearm.          Signed by: Emiliano Ng 11/19/2024 11:20 AM    Dictation workstation:   [id number]     CT head W O contrast trauma protocol   Final Result    Age related changes again present without acute intracranial process.          Signed by: Andres Chávez 11/19/2024 11:14 AM    Dictation workstation:   [id number]     CT cervical spine wo IV contrast   Final Result    Redemonstration of marked cervical spondylosis without acute fracture    or facet  subluxation.          Signed by: Andres Chávez 11/19/2024 11:17 AM    Dictation workstation:   [id number]                      Patient History   Past Medical History:   Diagnosis Date    Hypothyroidism, unspecified 01/15/2016    Hypothyroidism    Personal history of diseases of the blood and blood-forming organs and certain disorders involving the immune mechanism 11/13/2015    History of anemia    Personal history of other diseases of the circulatory system 01/15/2016    History of coronary atherosclerosis    Personal history of other diseases of the circulatory system 01/15/2016    History of hypertension    Personal history of other diseases of the musculoskeletal system and connective tissue 11/13/2015    History of rheumatoid arthritis    Pulmonary hypertension, unspecified (Multi) 01/15/2016    Pulmonary hypertension     Past Surgical History:   Procedure Laterality Date    AORTIC VALVE REPLACEMENT  01/15/2016    Aortic Valve Replacement    CORONARY ANGIOPLASTY  01/15/2016    PTCA    CT ABDOMEN PELVIS ANGIOGRAM W AND/OR WO IV CONTRAST  11/6/2015    CT ABDOMEN PELVIS ANGIOGRAM W AND/OR WO IV CONTRAST 11/6/2015 [facility]    ESOPHAGOGASTRODUODENOSCOPY  01/15/2016    Diagnostic Esophagogastroduodenoscopy    HYSTERECTOMY  01/15/2016    Hysterectomy    KNEE SURGERY  01/15/2016    Knee Surgery Left    MR HEAD ANGIO WO IV CONTRAST  1/22/2023    MR HEAD ANGIO WO IV CONTRAST 1/22/2023 [facility]    MR NECK ANGIO WO IV CONTRAST  1/22/2023    MR NECK ANGIO WO IV CONTRAST 1/22/2023 [facility]    OTHER SURGICAL HISTORY  05/19/2022    Appendectomy    OTHER SURGICAL HISTORY  05/19/2022    Cataract surgery    OTHER SURGICAL HISTORY  05/19/2022    Colonoscopy    OTHER SURGICAL HISTORY  05/19/2022    Hip replacement    OTHER SURGICAL HISTORY  05/19/2022    Tonsillectomy     Family History   Problem Relation Name Age of Onset    Hypertension Mother      Heart disease Father          RHEUMATIC     Hypertension Father      Coronary artery disease Father      Other (ENDOCARDITIS) Father       Social History     Tobacco Use    Smoking status: Former     Types: Cigarettes    Smokeless tobacco: Never   Substance Use Topics    Alcohol use: Never    Drug use: Never       Physical Exam   ED Triage Vitals [11/19/24 1020]   Temperature Heart Rate Respirations BP   36.3 °C (97.3 °F) 77 18 144/60      Pulse Ox Temp Source Heart Rate Source Patient Position   97 % Temporal Monitor Sitting      BP Location FiO2 (%)     Right arm --       Physical Exam      ED Course & MDM   Diagnoses as of 11/19/24 1234   Injury of head, initial encounter   Laceration of scalp, initial encounter   Fall, initial encounter                 No data recorded     Abimael Coma Scale Score: 15 (11/19/24 1100 : Israel Zambrano RN)                           Medical Decision Making      Procedure  Procedures     Araceli Wilburn MD  11/19/24 1233       Araceli Wilburn MD  11/19/24 1234

## 2024-11-19 NOTE — ED PROCEDURE NOTE
Procedure  Laceration Repair    Performed by: Araceli Wilburn MD  Authorized by: Araceli Wilburn MD    Consent:     Consent obtained:  Verbal    Risks discussed:  Infection, pain, retained foreign body, tendon damage, vascular damage, poor wound healing, poor cosmetic result, need for additional repair and nerve damage    Alternatives discussed:  No treatment  Universal protocol:     Test results available: yes      Patient identity confirmed:  Verbally with patient and hospital-assigned identification number  Anesthesia:     Anesthesia method:  Topical application    Topical anesthetic:  LET  Laceration details:     Location:  Scalp    Scalp location:  Occipital    Length (cm):  2  Pre-procedure details:     Preparation:  Patient was prepped and draped in usual sterile fashion  Exploration:     Imaging outcome: foreign body not noted      Wound extent: no areolar tissue violation noted, no fascia violation noted, no foreign bodies/material noted, no muscle damage noted, no nerve damage noted, no tendon damage noted, no underlying fracture noted and no vascular damage noted    Treatment:     Irrigation solution:  Sterile saline    Irrigation method:  Syringe  Skin repair:     Repair method:  Staples    Number of staples:  4  Approximation:     Approximation:  Close  Post-procedure details:     Dressing:  Antibiotic ointment               Araceli Wilburn MD  11/19/24 3989

## 2024-12-05 ENCOUNTER — TELEPHONE (OUTPATIENT)
Dept: CARDIOLOGY | Facility: CLINIC | Age: 89
End: 2024-12-05
Payer: MEDICARE

## 2024-12-05 DIAGNOSIS — Z95.2 S/P TAVR (TRANSCATHETER AORTIC VALVE REPLACEMENT): ICD-10-CM

## 2024-12-05 DIAGNOSIS — I10 BENIGN ESSENTIAL HTN: ICD-10-CM

## 2024-12-05 RX ORDER — LOSARTAN POTASSIUM 50 MG/1
50 TABLET ORAL NIGHTLY
Qty: 90 TABLET | Refills: 3 | OUTPATIENT
Start: 2024-12-05 | End: 2025-12-05

## 2024-12-05 NOTE — TELEPHONE ENCOUNTER
Priti patient's daughter in law called stating the patient is having elevated BP's in the 150's/160's and Dr. Jose Harris MD had cut her Losartan in 1/2 from 50 mg to 25 mg recently.  She would like to know if they should increase it back to 50 mg?  Priti's phone number is [phone].  Sent to [facility] for Dr. Jose Harris MD

## 2024-12-05 NOTE — TELEPHONE ENCOUNTER
Reviewed message with Jesus Benavidez NP.  Per Jesus, may increase Losartan to 50 mg once a day.  Call returned to patient's daughter, Priti and advised.  Rx sent to [facility] as requested.

## 2025-01-01 ENCOUNTER — APPOINTMENT (OUTPATIENT)
Dept: CARDIOLOGY | Facility: HOSPITAL | Age: OVER 89
DRG: 280 | End: 2025-01-01
Payer: MEDICARE

## 2025-01-01 ENCOUNTER — APPOINTMENT (OUTPATIENT)
Dept: RADIOLOGY | Facility: HOSPITAL | Age: OVER 89
DRG: 280 | End: 2025-01-01
Payer: MEDICARE

## 2025-01-01 ENCOUNTER — HOSPITAL ENCOUNTER (INPATIENT)
Facility: HOSPITAL | Age: OVER 89
LOS: 1 days | DRG: 951 | End: 2025-05-05
Attending: INTERNAL MEDICINE | Admitting: INTERNAL MEDICINE
Payer: MEDICARE

## 2025-01-01 VITALS
RESPIRATION RATE: 22 BRPM | SYSTOLIC BLOOD PRESSURE: 134 MMHG | OXYGEN SATURATION: 95 % | HEART RATE: 126 BPM | DIASTOLIC BLOOD PRESSURE: 72 MMHG | TEMPERATURE: 101.7 F

## 2025-01-01 DIAGNOSIS — I21.4 NSTEMI (NON-ST ELEVATED MYOCARDIAL INFARCTION) (MULTI): ICD-10-CM

## 2025-01-01 PROCEDURE — 99236 HOSP IP/OBS SAME DATE HI 85: CPT | Performed by: INTERNAL MEDICINE

## 2025-01-01 PROCEDURE — 93005 ELECTROCARDIOGRAM TRACING: CPT

## 2025-01-01 PROCEDURE — 71045 X-RAY EXAM CHEST 1 VIEW: CPT

## 2025-01-01 PROCEDURE — 2500000004 HC RX 250 GENERAL PHARMACY W/ HCPCS (ALT 636 FOR OP/ED): Mod: JZ | Performed by: INTERNAL MEDICINE

## 2025-01-01 PROCEDURE — 2500000001 HC RX 250 WO HCPCS SELF ADMINISTERED DRUGS (ALT 637 FOR MEDICARE OP): Performed by: NURSE PRACTITIONER

## 2025-01-01 PROCEDURE — 2500000005 HC RX 250 GENERAL PHARMACY W/O HCPCS: Performed by: INTERNAL MEDICINE

## 2025-01-01 PROCEDURE — 1150000001 HC HOSPICE PRIVATE ROOM DAILY

## 2025-01-01 PROCEDURE — 71275 CT ANGIOGRAPHY CHEST: CPT

## 2025-01-01 RX ORDER — ACETAMINOPHEN 160 MG/5ML
650 SOLUTION ORAL EVERY 4 HOURS PRN
Status: DISCONTINUED | OUTPATIENT
Start: 2025-01-01 | End: 2025-05-06 | Stop reason: HOSPADM

## 2025-01-01 RX ORDER — ACETAMINOPHEN 650 MG/1
650 SUPPOSITORY RECTAL EVERY 6 HOURS PRN
Status: DISCONTINUED | OUTPATIENT
Start: 2025-01-01 | End: 2025-05-06 | Stop reason: HOSPADM

## 2025-01-01 RX ORDER — ALBUTEROL SULFATE 0.83 MG/ML
2.5 SOLUTION RESPIRATORY (INHALATION) EVERY 4 HOURS PRN
Status: DISCONTINUED | OUTPATIENT
Start: 2025-01-01 | End: 2025-05-06 | Stop reason: HOSPADM

## 2025-01-01 RX ORDER — GLYCOPYRROLATE 0.2 MG/ML
0.2 INJECTION INTRAMUSCULAR; INTRAVENOUS EVERY 4 HOURS PRN
Status: DISCONTINUED | OUTPATIENT
Start: 2025-01-01 | End: 2025-05-06 | Stop reason: HOSPADM

## 2025-01-01 RX ORDER — LORAZEPAM 2 MG/ML
1 INJECTION INTRAMUSCULAR EVERY 2 HOUR PRN
Status: DISCONTINUED | OUTPATIENT
Start: 2025-01-01 | End: 2025-05-06 | Stop reason: HOSPADM

## 2025-01-01 RX ORDER — MORPHINE SULFATE 2 MG/ML
2 INJECTION, SOLUTION INTRAMUSCULAR; INTRAVENOUS
Status: DISCONTINUED | OUTPATIENT
Start: 2025-01-01 | End: 2025-05-06 | Stop reason: HOSPADM

## 2025-01-01 RX ORDER — ACETAMINOPHEN 325 MG/1
650 TABLET ORAL EVERY 4 HOURS PRN
Status: DISCONTINUED | OUTPATIENT
Start: 2025-01-01 | End: 2025-05-06 | Stop reason: HOSPADM

## 2025-01-01 RX ORDER — IPRATROPIUM BROMIDE AND ALBUTEROL SULFATE 2.5; .5 MG/3ML; MG/3ML
3 SOLUTION RESPIRATORY (INHALATION) EVERY 4 HOURS PRN
Status: DISCONTINUED | OUTPATIENT
Start: 2025-01-01 | End: 2025-05-06 | Stop reason: HOSPADM

## 2025-01-01 RX ADMIN — MORPHINE SULFATE 2 MG: 2 INJECTION, SOLUTION INTRAMUSCULAR; INTRAVENOUS at 21:33

## 2025-01-01 RX ADMIN — MORPHINE SULFATE 2 MG: 2 INJECTION, SOLUTION INTRAMUSCULAR; INTRAVENOUS at 13:17

## 2025-01-01 RX ADMIN — MORPHINE SULFATE 2 MG: 2 INJECTION, SOLUTION INTRAMUSCULAR; INTRAVENOUS at 20:09

## 2025-01-01 RX ADMIN — MORPHINE SULFATE 2 MG: 2 INJECTION, SOLUTION INTRAMUSCULAR; INTRAVENOUS at 15:33

## 2025-01-01 RX ADMIN — Medication 2 L/MIN: at 19:36

## 2025-01-01 RX ADMIN — MORPHINE SULFATE 2 MG: 2 INJECTION, SOLUTION INTRAMUSCULAR; INTRAVENOUS at 17:40

## 2025-01-01 RX ADMIN — MORPHINE SULFATE 2 MG: 2 INJECTION, SOLUTION INTRAMUSCULAR; INTRAVENOUS at 14:21

## 2025-01-01 RX ADMIN — MORPHINE SULFATE 2 MG: 2 INJECTION, SOLUTION INTRAMUSCULAR; INTRAVENOUS at 11:52

## 2025-01-01 RX ADMIN — LORAZEPAM 1 MG: 2 INJECTION INTRAMUSCULAR; INTRAVENOUS at 21:40

## 2025-01-01 RX ADMIN — MORPHINE SULFATE 2 MG: 2 INJECTION, SOLUTION INTRAMUSCULAR; INTRAVENOUS at 18:48

## 2025-01-01 RX ADMIN — MORPHINE SULFATE 2 MG: 2 INJECTION, SOLUTION INTRAMUSCULAR; INTRAVENOUS at 16:40

## 2025-01-01 RX ADMIN — ACETAMINOPHEN 650 MG: 650 SUPPOSITORY RECTAL at 20:50

## 2025-01-01 ASSESSMENT — COGNITIVE AND FUNCTIONAL STATUS - GENERAL
MOBILITY SCORE: 6
TOILETING: TOTAL
MOVING FROM LYING ON BACK TO SITTING ON SIDE OF FLAT BED WITH BEDRAILS: TOTAL
EATING MEALS: TOTAL
MOVING TO AND FROM BED TO CHAIR: TOTAL
DRESSING REGULAR UPPER BODY CLOTHING: TOTAL
DRESSING REGULAR LOWER BODY CLOTHING: TOTAL
PERSONAL GROOMING: TOTAL
TURNING FROM BACK TO SIDE WHILE IN FLAT BAD: TOTAL
WALKING IN HOSPITAL ROOM: TOTAL
HELP NEEDED FOR BATHING: TOTAL
STANDING UP FROM CHAIR USING ARMS: TOTAL
CLIMB 3 TO 5 STEPS WITH RAILING: TOTAL
DAILY ACTIVITIY SCORE: 6

## 2025-01-01 ASSESSMENT — PAIN - FUNCTIONAL ASSESSMENT
PAIN_FUNCTIONAL_ASSESSMENT: UNABLE TO SELF-REPORT
PAIN_FUNCTIONAL_ASSESSMENT: UNABLE TO SELF-REPORT

## 2025-05-02 ENCOUNTER — HOSPITAL ENCOUNTER (INPATIENT)
Facility: HOSPITAL | Age: OVER 89
End: 2025-05-02
Attending: EMERGENCY MEDICINE | Admitting: STUDENT IN AN ORGANIZED HEALTH CARE EDUCATION/TRAINING PROGRAM
Payer: MEDICARE

## 2025-05-02 DIAGNOSIS — D69.6 THROMBOCYTOPENIA (CMS-HCC): ICD-10-CM

## 2025-05-02 DIAGNOSIS — D64.9 ANEMIA, UNSPECIFIED TYPE: ICD-10-CM

## 2025-05-02 DIAGNOSIS — E83.42 HYPOMAGNESEMIA: ICD-10-CM

## 2025-05-02 DIAGNOSIS — I21.4 NSTEMI (NON-ST ELEVATED MYOCARDIAL INFARCTION) (MULTI): Primary | ICD-10-CM

## 2025-05-02 DIAGNOSIS — E87.1 HYPONATREMIA: ICD-10-CM

## 2025-05-02 DIAGNOSIS — J90 PLEURAL EFFUSION: ICD-10-CM

## 2025-05-02 LAB
ALBUMIN SERPL BCP-MCNC: 3.6 G/DL (ref 3.4–5)
ALP SERPL-CCNC: 57 U/L (ref 33–136)
ALT SERPL W P-5'-P-CCNC: 20 U/L (ref 7–45)
ANION GAP SERPL CALC-SCNC: 12 MMOL/L (ref 10–20)
APTT PPP: 31 SECONDS (ref 26–36)
AST SERPL W P-5'-P-CCNC: 31 U/L (ref 9–39)
BASOPHILS # BLD AUTO: 0.02 X10*3/UL (ref 0–0.1)
BASOPHILS NFR BLD AUTO: 0.1 %
BILIRUB SERPL-MCNC: 0.9 MG/DL (ref 0–1.2)
BNP SERPL-MCNC: 900 PG/ML (ref 0–99)
BUN SERPL-MCNC: 28 MG/DL (ref 6–23)
CALCIUM SERPL-MCNC: 8.6 MG/DL (ref 8.6–10.3)
CARDIAC TROPONIN I PNL SERPL HS: 5657 NG/L (ref 0–13)
CARDIAC TROPONIN I PNL SERPL HS: 5893 NG/L (ref 0–13)
CHLORIDE SERPL-SCNC: 102 MMOL/L (ref 98–107)
CO2 SERPL-SCNC: 21 MMOL/L (ref 21–32)
CREAT SERPL-MCNC: 1 MG/DL (ref 0.5–1.05)
EGFRCR SERPLBLD CKD-EPI 2021: 52 ML/MIN/1.73M*2
EOSINOPHIL # BLD AUTO: 0 X10*3/UL (ref 0–0.4)
EOSINOPHIL NFR BLD AUTO: 0 %
ERYTHROCYTE [DISTWIDTH] IN BLOOD BY AUTOMATED COUNT: 13.2 % (ref 11.5–14.5)
FLUAV RNA RESP QL NAA+PROBE: NOT DETECTED
FLUBV RNA RESP QL NAA+PROBE: NOT DETECTED
GLUCOSE SERPL-MCNC: 142 MG/DL (ref 74–99)
HCT VFR BLD AUTO: 31.9 % (ref 36–46)
HGB BLD-MCNC: 10.7 G/DL (ref 12–16)
IMM GRANULOCYTES # BLD AUTO: 0.08 X10*3/UL (ref 0–0.5)
IMM GRANULOCYTES NFR BLD AUTO: 0.6 % (ref 0–0.9)
INR PPP: 1.1 (ref 0.9–1.1)
LACTATE SERPL-SCNC: 1.5 MMOL/L (ref 0.4–2)
LACTATE SERPL-SCNC: 2.1 MMOL/L (ref 0.4–2)
LYMPHOCYTES # BLD AUTO: 0.45 X10*3/UL (ref 0.8–3)
LYMPHOCYTES NFR BLD AUTO: 3.1 %
MAGNESIUM SERPL-MCNC: 1.51 MG/DL (ref 1.6–2.4)
MCH RBC QN AUTO: 29.1 PG (ref 26–34)
MCHC RBC AUTO-ENTMCNC: 33.5 G/DL (ref 32–36)
MCV RBC AUTO: 87 FL (ref 80–100)
MONOCYTES # BLD AUTO: 1.24 X10*3/UL (ref 0.05–0.8)
MONOCYTES NFR BLD AUTO: 8.7 %
NEUTROPHILS # BLD AUTO: 12.5 X10*3/UL (ref 1.6–5.5)
NEUTROPHILS NFR BLD AUTO: 87.5 %
NRBC BLD-RTO: 0 /100 WBCS (ref 0–0)
PLATELET # BLD AUTO: 129 X10*3/UL (ref 150–450)
POTASSIUM SERPL-SCNC: 4.1 MMOL/L (ref 3.5–5.3)
PROT SERPL-MCNC: 6.1 G/DL (ref 6.4–8.2)
PROTHROMBIN TIME: 11.9 SECONDS (ref 9.8–12.4)
RBC # BLD AUTO: 3.68 X10*6/UL (ref 4–5.2)
RSV RNA RESP QL NAA+PROBE: NOT DETECTED
SARS-COV-2 RNA RESP QL NAA+PROBE: NOT DETECTED
SODIUM SERPL-SCNC: 131 MMOL/L (ref 136–145)
WBC # BLD AUTO: 14.3 X10*3/UL (ref 4.4–11.3)

## 2025-05-02 PROCEDURE — 1200000002 HC GENERAL ROOM WITH TELEMETRY DAILY

## 2025-05-02 PROCEDURE — 36415 COLL VENOUS BLD VENIPUNCTURE: CPT | Performed by: EMERGENCY MEDICINE

## 2025-05-02 PROCEDURE — 80053 COMPREHEN METABOLIC PANEL: CPT | Performed by: EMERGENCY MEDICINE

## 2025-05-02 PROCEDURE — 71045 X-RAY EXAM CHEST 1 VIEW: CPT | Mod: FOREIGN READ | Performed by: RADIOLOGY

## 2025-05-02 PROCEDURE — 80061 LIPID PANEL: CPT | Performed by: STUDENT IN AN ORGANIZED HEALTH CARE EDUCATION/TRAINING PROGRAM

## 2025-05-02 PROCEDURE — 84484 ASSAY OF TROPONIN QUANT: CPT | Performed by: EMERGENCY MEDICINE

## 2025-05-02 PROCEDURE — 87637 SARSCOV2&INF A&B&RSV AMP PRB: CPT | Performed by: EMERGENCY MEDICINE

## 2025-05-02 PROCEDURE — 85610 PROTHROMBIN TIME: CPT | Performed by: EMERGENCY MEDICINE

## 2025-05-02 PROCEDURE — 96365 THER/PROPH/DIAG IV INF INIT: CPT

## 2025-05-02 PROCEDURE — 85025 COMPLETE CBC W/AUTO DIFF WBC: CPT | Performed by: EMERGENCY MEDICINE

## 2025-05-02 PROCEDURE — 85730 THROMBOPLASTIN TIME PARTIAL: CPT | Performed by: EMERGENCY MEDICINE

## 2025-05-02 PROCEDURE — 2550000001 HC RX 255 CONTRASTS: Performed by: EMERGENCY MEDICINE

## 2025-05-02 PROCEDURE — 99292 CRITICAL CARE ADDL 30 MIN: CPT | Performed by: EMERGENCY MEDICINE

## 2025-05-02 PROCEDURE — 83735 ASSAY OF MAGNESIUM: CPT | Performed by: EMERGENCY MEDICINE

## 2025-05-02 PROCEDURE — 83605 ASSAY OF LACTIC ACID: CPT | Performed by: EMERGENCY MEDICINE

## 2025-05-02 PROCEDURE — 99291 CRITICAL CARE FIRST HOUR: CPT | Performed by: EMERGENCY MEDICINE

## 2025-05-02 PROCEDURE — 2500000002 HC RX 250 W HCPCS SELF ADMINISTERED DRUGS (ALT 637 FOR MEDICARE OP, ALT 636 FOR OP/ED): Performed by: EMERGENCY MEDICINE

## 2025-05-02 PROCEDURE — 84484 ASSAY OF TROPONIN QUANT: CPT | Performed by: STUDENT IN AN ORGANIZED HEALTH CARE EDUCATION/TRAINING PROGRAM

## 2025-05-02 PROCEDURE — 2500000004 HC RX 250 GENERAL PHARMACY W/ HCPCS (ALT 636 FOR OP/ED): Mod: JZ | Performed by: EMERGENCY MEDICINE

## 2025-05-02 PROCEDURE — 94640 AIRWAY INHALATION TREATMENT: CPT

## 2025-05-02 PROCEDURE — 96361 HYDRATE IV INFUSION ADD-ON: CPT

## 2025-05-02 PROCEDURE — 71275 CT ANGIOGRAPHY CHEST: CPT | Mod: FOREIGN READ | Performed by: RADIOLOGY

## 2025-05-02 PROCEDURE — 83880 ASSAY OF NATRIURETIC PEPTIDE: CPT | Performed by: EMERGENCY MEDICINE

## 2025-05-02 PROCEDURE — 99223 1ST HOSP IP/OBS HIGH 75: CPT | Performed by: STUDENT IN AN ORGANIZED HEALTH CARE EDUCATION/TRAINING PROGRAM

## 2025-05-02 RX ORDER — NAPROXEN SODIUM 220 MG/1
81 TABLET, FILM COATED ORAL DAILY
Status: DISPENSED | OUTPATIENT
Start: 2025-05-03

## 2025-05-02 RX ORDER — ACETAMINOPHEN 650 MG/1
650 SUPPOSITORY RECTAL EVERY 4 HOURS PRN
Status: DISPENSED | OUTPATIENT
Start: 2025-05-02

## 2025-05-02 RX ORDER — ACETAMINOPHEN 160 MG/5ML
650 SOLUTION ORAL EVERY 4 HOURS PRN
Status: ACTIVE | OUTPATIENT
Start: 2025-05-02

## 2025-05-02 RX ORDER — IPRATROPIUM BROMIDE AND ALBUTEROL SULFATE 2.5; .5 MG/3ML; MG/3ML
3 SOLUTION RESPIRATORY (INHALATION) EVERY 4 HOURS PRN
Status: DISPENSED | OUTPATIENT
Start: 2025-05-02

## 2025-05-02 RX ORDER — ACETAMINOPHEN 325 MG/1
650 TABLET ORAL EVERY 4 HOURS PRN
Status: DISPENSED | OUTPATIENT
Start: 2025-05-02

## 2025-05-02 RX ORDER — ATORVASTATIN CALCIUM 20 MG/1
20 TABLET, FILM COATED ORAL NIGHTLY
Status: DISPENSED | OUTPATIENT
Start: 2025-05-02

## 2025-05-02 RX ORDER — HEPARIN SODIUM 10000 [USP'U]/100ML
0-4000 INJECTION, SOLUTION INTRAVENOUS CONTINUOUS
Status: DISCONTINUED | OUTPATIENT
Start: 2025-05-02 | End: 2025-05-03

## 2025-05-02 RX ORDER — MAGNESIUM SULFATE HEPTAHYDRATE 40 MG/ML
2 INJECTION, SOLUTION INTRAVENOUS ONCE
Status: COMPLETED | OUTPATIENT
Start: 2025-05-02 | End: 2025-05-02

## 2025-05-02 RX ORDER — IPRATROPIUM BROMIDE AND ALBUTEROL SULFATE 2.5; .5 MG/3ML; MG/3ML
3 SOLUTION RESPIRATORY (INHALATION) ONCE
Status: COMPLETED | OUTPATIENT
Start: 2025-05-02 | End: 2025-05-02

## 2025-05-02 RX ORDER — NITROGLYCERIN 0.4 MG/1
0.4 TABLET SUBLINGUAL EVERY 5 MIN PRN
Status: ACTIVE | OUTPATIENT
Start: 2025-05-02

## 2025-05-02 RX ADMIN — IPRATROPIUM BROMIDE AND ALBUTEROL SULFATE 3 ML: 2.5; .5 SOLUTION RESPIRATORY (INHALATION) at 19:17

## 2025-05-02 RX ADMIN — HEPARIN SODIUM 600 UNITS/HR: 10000 INJECTION, SOLUTION INTRAVENOUS at 22:01

## 2025-05-02 RX ADMIN — IOHEXOL 75 ML: 350 INJECTION, SOLUTION INTRAVENOUS at 20:06

## 2025-05-02 RX ADMIN — SODIUM CHLORIDE 500 ML: 0.9 INJECTION, SOLUTION INTRAVENOUS at 19:05

## 2025-05-02 RX ADMIN — MAGNESIUM SULFATE HEPTAHYDRATE 2 G: 40 INJECTION, SOLUTION INTRAVENOUS at 18:56

## 2025-05-02 RX ADMIN — SODIUM CHLORIDE 500 ML: 0.9 INJECTION, SOLUTION INTRAVENOUS at 21:52

## 2025-05-02 ASSESSMENT — COLUMBIA-SUICIDE SEVERITY RATING SCALE - C-SSRS
2. HAVE YOU ACTUALLY HAD ANY THOUGHTS OF KILLING YOURSELF?: NO
1. IN THE PAST MONTH, HAVE YOU WISHED YOU WERE DEAD OR WISHED YOU COULD GO TO SLEEP AND NOT WAKE UP?: NO
6. HAVE YOU EVER DONE ANYTHING, STARTED TO DO ANYTHING, OR PREPARED TO DO ANYTHING TO END YOUR LIFE?: NO

## 2025-05-02 ASSESSMENT — PAIN SCALES - GENERAL: PAINLEVEL_OUTOF10: 0 - NO PAIN

## 2025-05-02 ASSESSMENT — PAIN - FUNCTIONAL ASSESSMENT: PAIN_FUNCTIONAL_ASSESSMENT: 0-10

## 2025-05-02 NOTE — ED PROVIDER NOTES
HPI   Chief Complaint   Patient presents with    Flu Symptoms     Pt presents to ED from home via ambulance for short of breath for 2wks. Received solumedrol and one breathing treatment from squad         History provided by:  Patient and EMS personnel    Chief Complaint   Patient presents with    Flu Symptoms     Pt presents to ED from home via ambulance for short of breath for 2wks. Received solumedrol and one breathing treatment from squad       History of Present Illness:  Gracie Carlson is a 89 y.o. female presents with shortness of breath over the past 2 weeks.  It was worse today.  No fever, chills or cough.  No chest pain.  No back or flank pain.  No nausea or vomiting.  No leg swelling.  EMS administered 1 breathing treatment and IV Solu-Medrol which made the patient's breathing much better.  No trauma or travel.  No sick contacts.      PMFSH:   As per HPI, otherwise nurses notes reviewed in EMR.    Past Medical History: Medical History[1]   Past Surgical History: Surgical History[2]   Family History: Family History[3]   Social History:  Social History[4]  Allergies: Allergies[5]  Current Outpatient Medications   Medication Instructions    ascorbic acid (VITAMIN C) 500 mg, oral, Daily    atorvastatin (Lipitor) 20 mg tablet 1 tablet, oral, Nightly    bisacodyl (Dulcolax) 5 mg EC tablet 1 tablet, oral, As needed    fluticasone propion-salmeteroL (Advair Diskus) 100-50 mcg/dose diskus inhaler 1 puff, inhalation, 2 times daily, Rinse mouth with water after use to reduce aftertaste and incidence of candidiasis. Do not swallow.    fluticasone/umeclidin/vilanter (TRELEGY ELLIPTA INHL) 1 puff, inhalation, Daily    losartan (COZAAR) 50 mg, oral, Daily    meclizine (Antivert) 12.5 mg tablet 1 tablet, oral, 3 times daily PRN    multivitamin tablet 1 tablet, oral, Daily    nitroglycerin (NITROSTAT) 0.4 mg, sublingual, Every 5 min PRN    ondansetron (ZOFRAN) 4 mg, oral, Every 8 hours PRN    ondansetron (ZOFRAN) 4  "mg, oral, Every 8 hours PRN    pantoprazole (PROTONIX) 40 mg, oral, Daily before breakfast, Do not crush, chew, or split.    polyethylene glycol (MIRALAX) 17 g, oral, Daily PRN    tiotropium (Spiriva) 18 mcg inhalation capsule 1 capsule, inhalation, As needed              Patient History   Medical History[6]  Surgical History[7]  Family History[8]  Social History[9]    Physical Exam   ED Triage Vitals [05/02/25 1831]   Temp Heart Rate Respirations BP   -- (!) 113 20 (!) 101/41      Pulse Ox Temp Source Heart Rate Source Patient Position   (!) 93 % Temporal Monitor Sitting      BP Location FiO2 (%)     Right arm --       Physical Exam  Physical Exam:    ED Triage Vitals [05/02/25 1831]   Temp Heart Rate Respirations BP   -- (!) 113 20 (!) 101/41      Pulse Ox Temp Source Heart Rate Source Patient Position   (!) 93 % Temporal Monitor Sitting      BP Location FiO2 (%)     Right arm --         Patient Vitals for the past 24 hrs:   BP Temp Temp src Pulse Resp SpO2 Height Weight   05/02/25 2131 97/50 -- -- (!) 110 -- 97 % -- --   05/02/25 2100 (!) 91/47 -- -- (!) 112 14 97 % -- --   05/02/25 2031 (!) 91/48 -- -- (!) 114 16 94 % -- --   05/02/25 2000 -- -- -- (!) 116 20 -- -- --   05/02/25 1952 (!) 92/49 -- -- (!) 114 -- 97 % -- --   05/02/25 1900 89/50 -- -- (!) 120 (!) 22 96 % -- --   05/02/25 1831 (!) 101/41 37 °C (98.6 °F) Temporal (!) 113 (!) 22 (!) 93 % 1.676 m (5' 6\") 49.9 kg (110 lb)       Constitutional: Vital signs per nursing notes.  Well developed, well nourished.  Mild acute distress.    Psychiatric: alert and oriented to person, place, and time; no abnormalities of mood or affect; memory intact  Eyes: PERRL; conjunctivae and lids normal; EOMI  ENT: otoscopic exam of external canal and TM´s normal; nasal mucosa, turbinates, and septum normal; mouth, tongue, and pharynx normal; pharynx without edema, exudate, or injection  Respiratory: normal respiratory effort and excursion; no rales, rhonchi, or wheezes; " equal but diminished air entry  Cardiovascular: Tachycardic, regular rate and rhythm; no murmurs, rubs or gallops; symmetric pulses; no edema; normal capillary refill; distal pulses present  Neurological: normal speech; CN II-XII grossly intact; normal motor and sensory function; no nystagmus; no pronator drift; normal finger to nose and heel to shin tests  GI: no masses, tenderness, rebound or guarding; no palpable, pulsatile mass; no organomegaly; no hernia; normal bowel sounds; (-) Roberts´s sign; (-) McBurney´s sign; (-) CVA tenderness  Lymphatic: no adenopathy of neck  Musculoskeletal: normal gait and station; normal digits and nails; no gross tendon or ligament injury; normal to palpation; normal strength/tone; neurovascular status intact; (-) Aileen´s sign; (-) straight leg raise; no palpable cords; calf circumference equal bilaterally  Skin: normal to inspection; normal to palpation; no rash  GCS: 15      ED Course & MDM   Diagnoses as of 05/02/25 2146   NSTEMI (non-ST elevated myocardial infarction) (Multi)   Anemia, unspecified type   Thrombocytopenia (CMS-HCC)   Hyponatremia   Hypomagnesemia   Pleural effusion                 No data recorded     Abimael Coma Scale Score: 15 (05/02/25 1832 : Morenita Villa RN)                           Medical Decision Making  Medical Decision Making:    EKG: My interpretation of EKG is sinus tachycardia 121 bpm with left axis deviation and nonspecific ST-T changes with occasional PAC              My interpretation of second EKG is sinus tachycardia 116 bpm with nonspecific ST-T changes and occasional PAC    Labs:   Labs Reviewed   CBC WITH AUTO DIFFERENTIAL - Abnormal       Result Value    WBC 14.3 (*)     nRBC 0.0      RBC 3.68 (*)     Hemoglobin 10.7 (*)     Hematocrit 31.9 (*)     MCV 87      MCH 29.1      MCHC 33.5      RDW 13.2      Platelets 129 (*)     Neutrophils % 87.5      Immature Granulocytes %, Automated 0.6      Lymphocytes % 3.1      Monocytes % 8.7       Eosinophils % 0.0      Basophils % 0.1      Neutrophils Absolute 12.50 (*)     Immature Granulocytes Absolute, Automated 0.08      Lymphocytes Absolute 0.45 (*)     Monocytes Absolute 1.24 (*)     Eosinophils Absolute 0.00      Basophils Absolute 0.02     COMPREHENSIVE METABOLIC PANEL - Abnormal    Glucose 142 (*)     Sodium 131 (*)     Potassium 4.1      Chloride 102      Bicarbonate 21      Anion Gap 12      Urea Nitrogen 28 (*)     Creatinine 1.00      eGFR 52 (*)     Calcium 8.6      Albumin 3.6      Alkaline Phosphatase 57      Total Protein 6.1 (*)     AST 31      Bilirubin, Total 0.9      ALT 20     MAGNESIUM - Abnormal    Magnesium 1.51 (*)    B-TYPE NATRIURETIC PEPTIDE - Abnormal     (*)     Narrative:        <100 pg/mL - Heart failure unlikely  100-299 pg/mL - Intermediate probability of acute heart                  failure exacerbation. Correlate with clinical                  context and patient history.    >=300 pg/mL - Heart Failure likely. Correlate with clinical                  context and patient history.    BNP testing is performed using different testing methodology at [facility] than at other [facility]. Direct result comparisons should only be made within the same method.      LACTATE - Abnormal    Lactate 2.1 (*)     Narrative:     Venipuncture immediately after or during the administration of Metamizole may lead to falsely low results. Testing should be performed immediately prior to Metamizole dosing.   SERIAL TROPONIN-INITIAL - Abnormal    Troponin I, High Sensitivity 5,657 (*)     Narrative:     Less than 99th percentile of normal range cutoff-  Female and children under 18 years old <14 ng/L; Male <21 ng/L: Negative  Repeat testing should be performed if clinically indicated.     Female and children under 18 years old 14-50 ng/L; Male 21-50 ng/L:  Consistent with possible cardiac damage and possible increased clinical   risk. Serial measurements may help to  assess extent of myocardial damage.     >50 ng/L: Consistent with cardiac damage, increased clinical risk and  myocardial infarction. Serial measurements may help assess extent of   myocardial damage.      NOTE: Children less than 1 year old may have higher baseline troponin   levels and results should be interpreted in conjunction with the overall   clinical context.     NOTE: Troponin I testing is performed using a different   testing methodology at [facility] than at other   [facility]. Direct result comparisons should only   be made within the same method.   SERIAL TROPONIN, 1 HOUR - Abnormal    Troponin I, High Sensitivity 5,893 (*)     Narrative:     Less than 99th percentile of normal range cutoff-  Female and children under 18 years old <14 ng/L; Male <21 ng/L: Negative  Repeat testing should be performed if clinically indicated.     Female and children under 18 years old 14-50 ng/L; Male 21-50 ng/L:  Consistent with possible cardiac damage and possible increased clinical   risk. Serial measurements may help to assess extent of myocardial damage.     >50 ng/L: Consistent with cardiac damage, increased clinical risk and  myocardial infarction. Serial measurements may help assess extent of   myocardial damage.      NOTE: Children less than 1 year old may have higher baseline troponin   levels and results should be interpreted in conjunction with the overall   clinical context.     NOTE: Troponin I testing is performed using a different   testing methodology at [facility] than at other   [facility]. Direct result comparisons should only   be made within the same method.   PROTIME-INR - Normal    Protime 11.9      INR 1.1     SARS-COV-2, INFLUENZA A/B AND RSV PCR - Normal    Coronavirus 2019, PCR Not Detected      Flu A Result Not Detected      Flu B Result Not Detected      RSV PCR Not Detected      Narrative:     This assay is an FDA-cleared, in vitro diagnostic nucleic  acid amplification test for the qualitative detection and differentiation of SARS CoV-2/ Influenza A/B/ RSV from nasopharyngeal specimens collected from individuals with signs and symptoms of respiratory tract infections, and has been validated for use at [facility]. Negative results do not preclude COVID-19/ Influenza A/B/ RSV infections and should not be used as the sole basis for diagnosis, treatment, or other management decisions. Testing for SARS CoV-2 is recommended only for patients who meet current clinical and/or epidemiological criteria defined by federal, state, or local public health directives.   LACTATE - Normal    Lactate 1.5      Narrative:     Venipuncture immediately after or during the administration of Metamizole may lead to falsely low results. Testing should be performed immediately prior to Metamizole dosing.   TROPONIN SERIES- (INITIAL, 1 HR)    Narrative:     The following orders were created for panel order Troponin I Series, High Sensitivity (0, 1 HR).  Procedure                               Abnormality         Status                     ---------                               -----------         ------                     Troponin I, High Sensiti...[[id number]]  Abnormal            Final result               Troponin, High Sensitivi...[[id number]]  Abnormal            Final result                 Please view results for these tests on the individual orders.   APTT       Diagnostic Imaging:   CT angio chest for pulmonary embolism   Final Result   1. No acute or chronic pulmonary emboli.   2. No thoracic aortic aneurysm or dissection.   3. Cardiomegaly with mild pulmonary edema.   4. Mild right and trace left pleural effusion.   5. Postprocedural changes from transcapillary aortic valve   replacement.   Signed by Loyd Plascencia MD      XR chest 1 view   Final Result   No acute pulmonary abnormality.   Signed by Jose Adamson MD          ED Medication Administration:    Medications   sodium chloride 0.9 % bolus 500 mL (has no administration in time range)   heparin bolus from bag 3,000 Units (has no administration in time range)   heparin 25,000 Units in dextrose 5% 250 mL (100 Units/mL) infusion (premix) (has no administration in time range)   heparin bolus from bag 1,500-3,000 Units (has no administration in time range)   ipratropium-albuteroL (Duo-Neb) 0.5-2.5 mg/3 mL nebulizer solution 3 mL (3 mL nebulization Given 5/2/25 1917)   magnesium sulfate 2 g in sterile water for injection 50 mL (0 g intravenous Stopped 5/2/25 1952)   sodium chloride 0.9 % bolus 500 mL (500 mL intravenous New Bag 5/2/25 1905)   iohexol (OMNIPaque) 350 mg iodine/mL solution 75 mL (75 mL intravenous Given 5/2/25 2006)       ED Course:     Gracie Carlson is a 89 y.o. female presents with shortness of breath.    Differential Diagnoses Considered:  ACS, arrhythmia, COPD, pneumonia, viral syndrome, CHF    Patient presents with shortness of breath.    Chest x-ray to evaluate for evidence of pneumonia/pneumothorax/CHF/COPD.     EKG/troponin to evaluate for evidence of arrhythmia/ACS.    Lab work to evaluate for evidence of anemia or significant electrolyte abnormality including hypokalemia, hyperkalemia, hyponatremia, hypernatremia, hyperglycemia or hypoglycemia.     RSV/Influenza/COVID-19 swabs to evaluate for evidence of respective infections.    Lactic acid to evaluate for possibility of sepsis.    Considered CT chest, but no CT chest indicated as I considered but do not suspect aortic dissection/pulmonary embolus.          Diagnoses as of 05/02/25 2146   NSTEMI (non-ST elevated myocardial infarction) (Multi)   Anemia, unspecified type   Thrombocytopenia (CMS-HCC)   Hyponatremia   Hypomagnesemia   Pleural effusion       Abnormal Labs Reviewed   CBC WITH AUTO DIFFERENTIAL - Abnormal; Notable for the following components:       Result Value    WBC 14.3 (*)     RBC 3.68 (*)     Hemoglobin 10.7 (*)      Hematocrit 31.9 (*)     Platelets 129 (*)     Neutrophils Absolute 12.50 (*)     Lymphocytes Absolute 0.45 (*)     Monocytes Absolute 1.24 (*)     All other components within normal limits   COMPREHENSIVE METABOLIC PANEL - Abnormal; Notable for the following components:    Glucose 142 (*)     Sodium 131 (*)     Urea Nitrogen 28 (*)     eGFR 52 (*)     Total Protein 6.1 (*)     All other components within normal limits   MAGNESIUM - Abnormal; Notable for the following components:    Magnesium 1.51 (*)     All other components within normal limits   B-TYPE NATRIURETIC PEPTIDE - Abnormal; Notable for the following components:     (*)     All other components within normal limits    Narrative:        <100 pg/mL - Heart failure unlikely  100-299 pg/mL - Intermediate probability of acute heart                  failure exacerbation. Correlate with clinical                  context and patient history.    >=300 pg/mL - Heart Failure likely. Correlate with clinical                  context and patient history.    BNP testing is performed using different testing methodology at [facility] than at other [facility]. Direct result comparisons should only be made within the same method.      LACTATE - Abnormal; Notable for the following components:    Lactate 2.1 (*)     All other components within normal limits    Narrative:     Venipuncture immediately after or during the administration of Metamizole may lead to falsely low results. Testing should be performed immediately prior to Metamizole dosing.   SERIAL TROPONIN-INITIAL - Abnormal; Notable for the following components:    Troponin I, High Sensitivity 5,657 (*)     All other components within normal limits    Narrative:     Less than 99th percentile of normal range cutoff-  Female and children under 18 years old <14 ng/L; Male <21 ng/L: Negative  Repeat testing should be performed if clinically indicated.     Female and children under 18 years old  14-50 ng/L; Male 21-50 ng/L:  Consistent with possible cardiac damage and possible increased clinical   risk. Serial measurements may help to assess extent of myocardial damage.     >50 ng/L: Consistent with cardiac damage, increased clinical risk and  myocardial infarction. Serial measurements may help assess extent of   myocardial damage.      NOTE: Children less than 1 year old may have higher baseline troponin   levels and results should be interpreted in conjunction with the overall   clinical context.     NOTE: Troponin I testing is performed using a different   testing methodology at [facility] than at other   [facility]. Direct result comparisons should only   be made within the same method.   SERIAL TROPONIN, 1 HOUR - Abnormal; Notable for the following components:    Troponin I, High Sensitivity 5,893 (*)     All other components within normal limits    Narrative:     Less than 99th percentile of normal range cutoff-  Female and children under 18 years old <14 ng/L; Male <21 ng/L: Negative  Repeat testing should be performed if clinically indicated.     Female and children under 18 years old 14-50 ng/L; Male 21-50 ng/L:  Consistent with possible cardiac damage and possible increased clinical   risk. Serial measurements may help to assess extent of myocardial damage.     >50 ng/L: Consistent with cardiac damage, increased clinical risk and  myocardial infarction. Serial measurements may help assess extent of   myocardial damage.      NOTE: Children less than 1 year old may have higher baseline troponin   levels and results should be interpreted in conjunction with the overall   clinical context.     NOTE: Troponin I testing is performed using a different   testing methodology at [facility] than at other   [facility]. Direct result comparisons should only   be made within the same method.       BP 97/50 (05/02/25 2131)    Temp      Pulse (!) 110 (05/02/25 2131)   Resp  14 (05/02/25 2100)    SpO2 97 % (05/02/25 2131)          Diagnostic evaluation was completed.  Troponin is elevated at 5657.  Repeat troponin was slightly uptrending at 5893.  CBC shows a slightly elevated white blood cell count of 14.3 with mild anemia with a hemoglobin of 10.7.  Platelets are slightly low at 129.  COVID, influenza and RSV are negative.  Metabolic panel shows a elevated glucose at 142.  Sodium is slightly low at 131.  Potassium is in the normal range.  BUN is elevated at 28 with a normal creatinine of 1.0.  AST and ALT are in the normal range.  Magnesium is slightly low at 1.51.  BNP is elevated at 900.  Lactate is elevated at 2.1.  Repeat lactate was downtrending at 1.5.  INR is 1.1.  Chest x-ray shows no acute pulmonary abnormality.  CT of the chest shows no acute or chronic pulmonary emboli.  No thoracic aortic aneurysm or dissection.  Cardiomegaly with mild pulmonary edema.  Mild right and trace left pleural effusion.  Postprocedural changes from transcatheter aortic valve replacement.    Re-evaluation: Repeat evaluation at 2130 shows the patient is feeling slightly better.  Her vital signs have slightly improved.    Patient was initially treated with breathing treatment and IV magnesium.  Once the elevated troponins resulted she was treated with IV heparin drip.    Medications administered improved the patient's condition.    I suspect the patient's progressive shortness of breath is likely related to a non-ST elevation MI.  She has been placed on a heparin drip.  She will require hospitalization for further workup and evaluation.    The patient's condition requires ongoing treatment and evaluation necessitating hospital admission.  I have reviewed the patient's history, physical exam, and test information with the admitting physician,    Dr. Choudhary               , who agrees to hospitalize the patient.  The patient will be admitted to the intermediate care unit.  She is a DNR and no  intubation.    I discussed the results and plan for hospitalization with the patient and/or family/friend if present.  Questions were addressed.  Patient and/or family/friend expressed understanding.    Shared decision making made with patient, and/or family, who agrees with plan.      Diagnosis:   1. NSTEMI (non-ST elevated myocardial infarction) (Multi)    2. Anemia, unspecified type    3. Thrombocytopenia (CMS-HCC)    4. Hyponatremia    5. Hypomagnesemia    6. Pleural effusion              Procedure  Procedures         [1]   Past Medical History:  Diagnosis Date    Hypothyroidism, unspecified 01/15/2016    Hypothyroidism    Personal history of diseases of the blood and blood-forming organs and certain disorders involving the immune mechanism 11/13/2015    History of anemia    Personal history of other diseases of the circulatory system 01/15/2016    History of coronary atherosclerosis    Personal history of other diseases of the circulatory system 01/15/2016    History of hypertension    Personal history of other diseases of the musculoskeletal system and connective tissue 11/13/2015    History of rheumatoid arthritis    Pulmonary hypertension, unspecified (Multi) 01/15/2016    Pulmonary hypertension   [2]   Past Surgical History:  Procedure Laterality Date    AORTIC VALVE REPLACEMENT  01/15/2016    Aortic Valve Replacement    CORONARY ANGIOPLASTY  01/15/2016    PTCA    CT ABDOMEN PELVIS ANGIOGRAM W AND/OR WO IV CONTRAST  11/6/2015    CT ABDOMEN PELVIS ANGIOGRAM W AND/OR WO IV CONTRAST 11/6/2015 [facility] ANCILLARY LEGACY    ESOPHAGOGASTRODUODENOSCOPY  01/15/2016    Diagnostic Esophagogastroduodenoscopy    HYSTERECTOMY  01/15/2016    Hysterectomy    KNEE SURGERY  01/15/2016    Knee Surgery Left    MR HEAD ANGIO WO IV CONTRAST  1/22/2023    MR HEAD ANGIO WO IV CONTRAST 1/22/2023 DOCTOR OFFICE LEGACY    MR NECK ANGIO WO IV CONTRAST  1/22/2023    MR NECK ANGIO WO IV CONTRAST 1/22/2023 DOCTOR OFFICE LEGACY    OTHER  SURGICAL HISTORY  05/19/2022    Appendectomy    OTHER SURGICAL HISTORY  05/19/2022    Cataract surgery    OTHER SURGICAL HISTORY  05/19/2022    Colonoscopy    OTHER SURGICAL HISTORY  05/19/2022    Hip replacement    OTHER SURGICAL HISTORY  05/19/2022    Tonsillectomy   [3]   Family History  Problem Relation Name Age of Onset    Hypertension Mother      Heart disease Father          RHEUMATIC    Hypertension Father      Coronary artery disease Father      Other (ENDOCARDITIS) Father     [4]   Social History  Tobacco Use    Smoking status: Former     Types: Cigarettes    Smokeless tobacco: Never   Substance Use Topics    Alcohol use: Never    Drug use: Never   [5]   Allergies  Allergen Reactions    Oxycodone Unknown    Sulfa (Sulfonamide Antibiotics) Unknown   [6]   Past Medical History:  Diagnosis Date    Hypothyroidism, unspecified 01/15/2016    Hypothyroidism    Personal history of diseases of the blood and blood-forming organs and certain disorders involving the immune mechanism 11/13/2015    History of anemia    Personal history of other diseases of the circulatory system 01/15/2016    History of coronary atherosclerosis    Personal history of other diseases of the circulatory system 01/15/2016    History of hypertension    Personal history of other diseases of the musculoskeletal system and connective tissue 11/13/2015    History of rheumatoid arthritis    Pulmonary hypertension, unspecified (Multi) 01/15/2016    Pulmonary hypertension   [7]   Past Surgical History:  Procedure Laterality Date    AORTIC VALVE REPLACEMENT  01/15/2016    Aortic Valve Replacement    CORONARY ANGIOPLASTY  01/15/2016    PTCA    CT ABDOMEN PELVIS ANGIOGRAM W AND/OR WO IV CONTRAST  11/6/2015    CT ABDOMEN PELVIS ANGIOGRAM W AND/OR WO IV CONTRAST 11/6/2015 [facility]    ESOPHAGOGASTRODUODENOSCOPY  01/15/2016    Diagnostic Esophagogastroduodenoscopy    HYSTERECTOMY  01/15/2016    Hysterectomy    KNEE SURGERY  01/15/2016    Knee  Surgery Left    MR HEAD ANGIO WO IV CONTRAST  1/22/2023    MR HEAD ANGIO WO IV CONTRAST 1/22/2023 DOCTOR OFFICE LEGACY    MR NECK ANGIO WO IV CONTRAST  1/22/2023    MR NECK ANGIO WO IV CONTRAST 1/22/2023 DOCTOR OFFICE LEGACY    OTHER SURGICAL HISTORY  05/19/2022    Appendectomy    OTHER SURGICAL HISTORY  05/19/2022    Cataract surgery    OTHER SURGICAL HISTORY  05/19/2022    Colonoscopy    OTHER SURGICAL HISTORY  05/19/2022    Hip replacement    OTHER SURGICAL HISTORY  05/19/2022    Tonsillectomy   [8]   Family History  Problem Relation Name Age of Onset    Hypertension Mother      Heart disease Father          RHEUMATIC    Hypertension Father      Coronary artery disease Father      Other (ENDOCARDITIS) Father     [9]   Social History  Tobacco Use    Smoking status: Former     Types: Cigarettes    Smokeless tobacco: Never   Substance Use Topics    Alcohol use: Never    Drug use: Never        Arsh BALBUENA MD  05/02/25 7984

## 2025-05-03 ENCOUNTER — APPOINTMENT (OUTPATIENT)
Dept: CARDIOLOGY | Facility: HOSPITAL | Age: OVER 89
End: 2025-05-03
Payer: MEDICARE

## 2025-05-03 LAB
ALBUMIN SERPL BCP-MCNC: 3.4 G/DL (ref 3.4–5)
ANION GAP SERPL CALC-SCNC: 14 MMOL/L (ref 10–20)
ATRIAL RATE: 116 BPM
ATRIAL RATE: 121 BPM
ATRIAL RATE: 91 BPM
BUN SERPL-MCNC: 30 MG/DL (ref 6–23)
CALCIUM SERPL-MCNC: 8.3 MG/DL (ref 8.6–10.3)
CARDIAC TROPONIN I PNL SERPL HS: 5726 NG/L (ref 0–13)
CHLORIDE SERPL-SCNC: 102 MMOL/L (ref 98–107)
CHOLEST SERPL-MCNC: 138 MG/DL (ref 0–199)
CHOLESTEROL/HDL RATIO: 2.2
CO2 SERPL-SCNC: 19 MMOL/L (ref 21–32)
CREAT SERPL-MCNC: 1.01 MG/DL (ref 0.5–1.05)
EGFRCR SERPLBLD CKD-EPI 2021: 51 ML/MIN/1.73M*2
ERYTHROCYTE [DISTWIDTH] IN BLOOD BY AUTOMATED COUNT: 13.4 % (ref 11.5–14.5)
GLUCOSE BLD MANUAL STRIP-MCNC: 181 MG/DL (ref 74–99)
GLUCOSE SERPL-MCNC: 195 MG/DL (ref 74–99)
HCT VFR BLD AUTO: 29.9 % (ref 36–46)
HDLC SERPL-MCNC: 63.7 MG/DL
HGB BLD-MCNC: 10.3 G/DL (ref 12–16)
LDLC SERPL CALC-MCNC: 59 MG/DL
MAGNESIUM SERPL-MCNC: 2.34 MG/DL (ref 1.6–2.4)
MCH RBC QN AUTO: 29.6 PG (ref 26–34)
MCHC RBC AUTO-ENTMCNC: 34.4 G/DL (ref 32–36)
MCV RBC AUTO: 86 FL (ref 80–100)
NON HDL CHOLESTEROL: 74 MG/DL (ref 0–149)
NRBC BLD-RTO: 0 /100 WBCS (ref 0–0)
P AXIS: 80 DEGREES
P AXIS: 81 DEGREES
P AXIS: 87 DEGREES
P OFFSET: 213 MS
P OFFSET: 214 MS
P OFFSET: 223 MS
P ONSET: 146 MS
P ONSET: 148 MS
P ONSET: 150 MS
PHOSPHATE SERPL-MCNC: 3.7 MG/DL (ref 2.5–4.9)
PLATELET # BLD AUTO: 118 X10*3/UL (ref 150–450)
POTASSIUM SERPL-SCNC: 4.4 MMOL/L (ref 3.5–5.3)
PR INTERVAL: 144 MS
PR INTERVAL: 152 MS
PR INTERVAL: 152 MS
Q ONSET: 218 MS
Q ONSET: 224 MS
Q ONSET: 226 MS
QRS COUNT: 15 BEATS
QRS COUNT: 19 BEATS
QRS COUNT: 20 BEATS
QRS DURATION: 72 MS
QRS DURATION: 72 MS
QRS DURATION: 76 MS
QT INTERVAL: 322 MS
QT INTERVAL: 342 MS
QT INTERVAL: 356 MS
QTC CALCULATION(BAZETT): 437 MS
QTC CALCULATION(BAZETT): 457 MS
QTC CALCULATION(BAZETT): 475 MS
QTC FREDERICIA: 406 MS
QTC FREDERICIA: 409 MS
QTC FREDERICIA: 426 MS
R AXIS: -31 DEGREES
R AXIS: -65 DEGREES
R AXIS: 1 DEGREES
RBC # BLD AUTO: 3.48 X10*6/UL (ref 4–5.2)
SODIUM SERPL-SCNC: 131 MMOL/L (ref 136–145)
T AXIS: -8 DEGREES
T AXIS: 64 DEGREES
T AXIS: 69 DEGREES
T OFFSET: 379 MS
T OFFSET: 397 MS
T OFFSET: 402 MS
TRIGL SERPL-MCNC: 78 MG/DL (ref 0–149)
UFH PPP CHRO-ACNC: 0.4 IU/ML (ref ?–1.1)
UFH PPP CHRO-ACNC: 0.5 IU/ML (ref ?–1.1)
VENTRICULAR RATE: 116 BPM
VENTRICULAR RATE: 121 BPM
VENTRICULAR RATE: 91 BPM
VLDL: 16 MG/DL (ref 0–40)
WBC # BLD AUTO: 15.7 X10*3/UL (ref 4.4–11.3)

## 2025-05-03 PROCEDURE — 80069 RENAL FUNCTION PANEL: CPT | Performed by: STUDENT IN AN ORGANIZED HEALTH CARE EDUCATION/TRAINING PROGRAM

## 2025-05-03 PROCEDURE — 85027 COMPLETE CBC AUTOMATED: CPT | Performed by: STUDENT IN AN ORGANIZED HEALTH CARE EDUCATION/TRAINING PROGRAM

## 2025-05-03 PROCEDURE — 2500000001 HC RX 250 WO HCPCS SELF ADMINISTERED DRUGS (ALT 637 FOR MEDICARE OP): Performed by: INTERNAL MEDICINE

## 2025-05-03 PROCEDURE — 93306 TTE W/DOPPLER COMPLETE: CPT | Performed by: INTERNAL MEDICINE

## 2025-05-03 PROCEDURE — 99233 SBSQ HOSP IP/OBS HIGH 50: CPT | Performed by: INTERNAL MEDICINE

## 2025-05-03 PROCEDURE — 93010 ELECTROCARDIOGRAM REPORT: CPT | Performed by: INTERNAL MEDICINE

## 2025-05-03 PROCEDURE — C8929 TTE W OR WO FOL WCON,DOPPLER: HCPCS

## 2025-05-03 PROCEDURE — 2500000004 HC RX 250 GENERAL PHARMACY W/ HCPCS (ALT 636 FOR OP/ED): Performed by: NURSE PRACTITIONER

## 2025-05-03 PROCEDURE — 2500000002 HC RX 250 W HCPCS SELF ADMINISTERED DRUGS (ALT 637 FOR MEDICARE OP, ALT 636 FOR OP/ED): Mod: JZ | Performed by: STUDENT IN AN ORGANIZED HEALTH CARE EDUCATION/TRAINING PROGRAM

## 2025-05-03 PROCEDURE — 2500000001 HC RX 250 WO HCPCS SELF ADMINISTERED DRUGS (ALT 637 FOR MEDICARE OP): Performed by: NURSE PRACTITIONER

## 2025-05-03 PROCEDURE — 2500000004 HC RX 250 GENERAL PHARMACY W/ HCPCS (ALT 636 FOR OP/ED)

## 2025-05-03 PROCEDURE — 2500000005 HC RX 250 GENERAL PHARMACY W/O HCPCS: Performed by: STUDENT IN AN ORGANIZED HEALTH CARE EDUCATION/TRAINING PROGRAM

## 2025-05-03 PROCEDURE — 83735 ASSAY OF MAGNESIUM: CPT | Performed by: STUDENT IN AN ORGANIZED HEALTH CARE EDUCATION/TRAINING PROGRAM

## 2025-05-03 PROCEDURE — 36415 COLL VENOUS BLD VENIPUNCTURE: CPT | Performed by: STUDENT IN AN ORGANIZED HEALTH CARE EDUCATION/TRAINING PROGRAM

## 2025-05-03 PROCEDURE — 2500000004 HC RX 250 GENERAL PHARMACY W/ HCPCS (ALT 636 FOR OP/ED): Mod: JZ | Performed by: INTERNAL MEDICINE

## 2025-05-03 PROCEDURE — 82947 ASSAY GLUCOSE BLOOD QUANT: CPT

## 2025-05-03 PROCEDURE — 2500000004 HC RX 250 GENERAL PHARMACY W/ HCPCS (ALT 636 FOR OP/ED): Performed by: STUDENT IN AN ORGANIZED HEALTH CARE EDUCATION/TRAINING PROGRAM

## 2025-05-03 PROCEDURE — 85520 HEPARIN ASSAY: CPT | Performed by: STUDENT IN AN ORGANIZED HEALTH CARE EDUCATION/TRAINING PROGRAM

## 2025-05-03 PROCEDURE — 2500000001 HC RX 250 WO HCPCS SELF ADMINISTERED DRUGS (ALT 637 FOR MEDICARE OP): Performed by: STUDENT IN AN ORGANIZED HEALTH CARE EDUCATION/TRAINING PROGRAM

## 2025-05-03 PROCEDURE — 94640 AIRWAY INHALATION TREATMENT: CPT

## 2025-05-03 PROCEDURE — 99223 1ST HOSP IP/OBS HIGH 75: CPT | Performed by: INTERNAL MEDICINE

## 2025-05-03 PROCEDURE — 2500000002 HC RX 250 W HCPCS SELF ADMINISTERED DRUGS (ALT 637 FOR MEDICARE OP, ALT 636 FOR OP/ED): Performed by: INTERNAL MEDICINE

## 2025-05-03 PROCEDURE — 1200000002 HC GENERAL ROOM WITH TELEMETRY DAILY

## 2025-05-03 RX ORDER — LORAZEPAM 2 MG/ML
1 INJECTION INTRAMUSCULAR ONCE
Status: COMPLETED | OUTPATIENT
Start: 2025-05-03 | End: 2025-05-03

## 2025-05-03 RX ORDER — CHOLECALCIFEROL (VITAMIN D3) 25 MCG
25 TABLET ORAL DAILY
Status: ON HOLD | COMMUNITY

## 2025-05-03 RX ORDER — FLUTICASONE FUROATE AND VILANTEROL 100; 25 UG/1; UG/1
1 POWDER RESPIRATORY (INHALATION)
Status: DISPENSED | OUTPATIENT
Start: 2025-05-03

## 2025-05-03 RX ORDER — CHOLECALCIFEROL (VITAMIN D3) 25 MCG
25 TABLET ORAL DAILY
Status: DISCONTINUED | OUTPATIENT
Start: 2025-05-03 | End: 2025-05-04

## 2025-05-03 RX ORDER — LANOLIN ALCOHOL/MO/W.PET/CERES
1000 CREAM (GRAM) TOPICAL DAILY
Status: ON HOLD | COMMUNITY

## 2025-05-03 RX ORDER — CLOPIDOGREL BISULFATE 75 MG/1
75 TABLET ORAL DAILY
Status: DISPENSED | OUTPATIENT
Start: 2025-05-03

## 2025-05-03 RX ORDER — MORPHINE SULFATE 2 MG/ML
1 INJECTION, SOLUTION INTRAMUSCULAR; INTRAVENOUS ONCE
Status: DISCONTINUED | OUTPATIENT
Start: 2025-05-03 | End: 2025-05-04

## 2025-05-03 RX ORDER — LEVOTHYROXINE SODIUM 75 UG/1
75 TABLET ORAL DAILY
Status: DISPENSED | OUTPATIENT
Start: 2025-05-03

## 2025-05-03 RX ORDER — MORPHINE SULFATE 2 MG/ML
INJECTION, SOLUTION INTRAMUSCULAR; INTRAVENOUS
Status: COMPLETED
Start: 2025-05-03 | End: 2025-05-03

## 2025-05-03 RX ORDER — VIT C/E/ZN/COPPR/LUTEIN/ZEAXAN 250MG-90MG
1000 CAPSULE ORAL DAILY
Status: DISCONTINUED | OUTPATIENT
Start: 2025-05-03 | End: 2025-05-04

## 2025-05-03 RX ORDER — ENOXAPARIN SODIUM 100 MG/ML
30 INJECTION SUBCUTANEOUS EVERY 24 HOURS
Status: DISPENSED | OUTPATIENT
Start: 2025-05-03

## 2025-05-03 RX ORDER — METOPROLOL SUCCINATE 25 MG/1
12.5 TABLET, EXTENDED RELEASE ORAL DAILY
Status: DISPENSED | OUTPATIENT
Start: 2025-05-03

## 2025-05-03 RX ORDER — MORPHINE SULFATE 2 MG/ML
1 INJECTION, SOLUTION INTRAMUSCULAR; INTRAVENOUS ONCE
Status: COMPLETED | OUTPATIENT
Start: 2025-05-03 | End: 2025-05-03

## 2025-05-03 RX ORDER — FUROSEMIDE 10 MG/ML
20 INJECTION INTRAMUSCULAR; INTRAVENOUS ONCE
Status: COMPLETED | OUTPATIENT
Start: 2025-05-03 | End: 2025-05-03

## 2025-05-03 RX ORDER — MENTHOL AND ZINC OXIDE .44; 20.625 G/100G; G/100G
1 OINTMENT TOPICAL 4 TIMES DAILY PRN
Status: DISPENSED | OUTPATIENT
Start: 2025-05-03

## 2025-05-03 RX ORDER — OLANZAPINE 10 MG/2ML
2.5 INJECTION, POWDER, FOR SOLUTION INTRAMUSCULAR EVERY 6 HOURS PRN
Status: DISCONTINUED | OUTPATIENT
Start: 2025-05-03 | End: 2025-05-04

## 2025-05-03 RX ORDER — FUROSEMIDE 10 MG/ML
20 INJECTION INTRAMUSCULAR; INTRAVENOUS ONCE
Status: COMPLETED | OUTPATIENT
Start: 2025-05-04 | End: 2025-05-04

## 2025-05-03 RX ORDER — LEVOTHYROXINE SODIUM 75 UG/1
75 TABLET ORAL DAILY
Status: ON HOLD | COMMUNITY

## 2025-05-03 RX ADMIN — PERFLUTREN 1.5 ML OF DILUTION: 6.52 INJECTION, SUSPENSION INTRAVENOUS at 08:15

## 2025-05-03 RX ADMIN — ACETAMINOPHEN 650 MG: 325 TABLET ORAL at 13:22

## 2025-05-03 RX ADMIN — Medication 1 APPLICATION: at 03:54

## 2025-05-03 RX ADMIN — LEVOTHYROXINE SODIUM 75 MCG: 75 TABLET ORAL at 09:59

## 2025-05-03 RX ADMIN — MORPHINE SULFATE 1 MG: 2 INJECTION, SOLUTION INTRAMUSCULAR; INTRAVENOUS at 13:22

## 2025-05-03 RX ADMIN — Medication 25 MCG: at 10:04

## 2025-05-03 RX ADMIN — IPRATROPIUM BROMIDE AND ALBUTEROL SULFATE 3 ML: .5; 3 SOLUTION RESPIRATORY (INHALATION) at 11:32

## 2025-05-03 RX ADMIN — ENOXAPARIN SODIUM 30 MG: 30 INJECTION SUBCUTANEOUS at 11:32

## 2025-05-03 RX ADMIN — CLOPIDOGREL BISULFATE 75 MG: 75 TABLET, FILM COATED ORAL at 10:00

## 2025-05-03 RX ADMIN — Medication 2 L/MIN: at 00:38

## 2025-05-03 RX ADMIN — MORPHINE SULFATE 1 MG: 2 INJECTION, SOLUTION INTRAMUSCULAR; INTRAVENOUS at 23:30

## 2025-05-03 RX ADMIN — METOPROLOL SUCCINATE 12.5 MG: 25 TABLET, EXTENDED RELEASE ORAL at 10:00

## 2025-05-03 RX ADMIN — Medication 1000 MCG: at 10:00

## 2025-05-03 RX ADMIN — ASPIRIN 81 MG: 81 TABLET, CHEWABLE ORAL at 10:00

## 2025-05-03 RX ADMIN — ATORVASTATIN CALCIUM 20 MG: 20 TABLET, FILM COATED ORAL at 00:48

## 2025-05-03 RX ADMIN — OLANZAPINE 2.5 MG: 10 INJECTION, POWDER, FOR SOLUTION INTRAMUSCULAR at 21:24

## 2025-05-03 RX ADMIN — Medication 2 L/MIN: at 19:38

## 2025-05-03 RX ADMIN — TIOTROPIUM BROMIDE INHALATION SPRAY 2 PUFF: 3.12 SPRAY, METERED RESPIRATORY (INHALATION) at 07:30

## 2025-05-03 RX ADMIN — FUROSEMIDE 20 MG: 10 INJECTION, SOLUTION INTRAMUSCULAR; INTRAVENOUS at 11:32

## 2025-05-03 RX ADMIN — FLUTICASONE FUROATE AND VILANTEROL TRIFENATATE 1 PUFF: 100; 25 POWDER RESPIRATORY (INHALATION) at 07:30

## 2025-05-03 RX ADMIN — Medication 2 L/MIN: at 08:59

## 2025-05-03 RX ADMIN — LORAZEPAM 1 MG: 2 INJECTION, SOLUTION INTRAMUSCULAR; INTRAVENOUS at 15:39

## 2025-05-03 SDOH — HEALTH STABILITY: MENTAL HEALTH: HOW OFTEN DO YOU HAVE SIX OR MORE DRINKS ON ONE OCCASION?: NEVER

## 2025-05-03 SDOH — SOCIAL STABILITY: SOCIAL INSECURITY: ARE THERE ANY APPARENT SIGNS OF INJURIES/BEHAVIORS THAT COULD BE RELATED TO ABUSE/NEGLECT?: UNABLE TO ASSESS

## 2025-05-03 SDOH — SOCIAL STABILITY: SOCIAL INSECURITY: ARE YOU OR HAVE YOU BEEN THREATENED OR ABUSED PHYSICALLY, EMOTIONALLY, OR SEXUALLY BY ANYONE?: UNABLE TO ASSESS

## 2025-05-03 SDOH — HEALTH STABILITY: MENTAL HEALTH: HOW OFTEN DO YOU HAVE A DRINK CONTAINING ALCOHOL?: NEVER

## 2025-05-03 SDOH — HEALTH STABILITY: MENTAL HEALTH: HOW MANY DRINKS CONTAINING ALCOHOL DO YOU HAVE ON A TYPICAL DAY WHEN YOU ARE DRINKING?: PATIENT DOES NOT DRINK

## 2025-05-03 SDOH — SOCIAL STABILITY: SOCIAL INSECURITY
WITHIN THE LAST YEAR, HAVE YOU BEEN RAPED OR FORCED TO HAVE ANY KIND OF SEXUAL ACTIVITY BY YOUR PARTNER OR EX-PARTNER?: NO

## 2025-05-03 SDOH — SOCIAL STABILITY: SOCIAL INSECURITY: WITHIN THE LAST YEAR, HAVE YOU BEEN AFRAID OF YOUR PARTNER OR EX-PARTNER?: NO

## 2025-05-03 SDOH — SOCIAL STABILITY: SOCIAL INSECURITY: WERE YOU ABLE TO COMPLETE ALL THE BEHAVIORAL HEALTH SCREENINGS?: NO

## 2025-05-03 SDOH — SOCIAL STABILITY: SOCIAL INSECURITY: HAS ANYONE EVER THREATENED TO HURT YOUR FAMILY OR YOUR PETS?: UNABLE TO ASSESS

## 2025-05-03 SDOH — ECONOMIC STABILITY: FOOD INSECURITY: WITHIN THE PAST 12 MONTHS, YOU WORRIED THAT YOUR FOOD WOULD RUN OUT BEFORE YOU GOT THE MONEY TO BUY MORE.: NEVER TRUE

## 2025-05-03 SDOH — ECONOMIC STABILITY: INCOME INSECURITY: IN THE PAST 12 MONTHS HAS THE ELECTRIC, GAS, OIL, OR WATER COMPANY THREATENED TO SHUT OFF SERVICES IN YOUR HOME?: NO

## 2025-05-03 SDOH — ECONOMIC STABILITY: HOUSING INSECURITY: IN THE PAST 12 MONTHS, HOW MANY TIMES HAVE YOU MOVED WHERE YOU WERE LIVING?: 0

## 2025-05-03 SDOH — SOCIAL STABILITY: SOCIAL INSECURITY: DOES ANYONE TRY TO KEEP YOU FROM HAVING/CONTACTING OTHER FRIENDS OR DOING THINGS OUTSIDE YOUR HOME?: UNABLE TO ASSESS

## 2025-05-03 SDOH — SOCIAL STABILITY: SOCIAL INSECURITY: WITHIN THE LAST YEAR, HAVE YOU BEEN HUMILIATED OR EMOTIONALLY ABUSED IN OTHER WAYS BY YOUR PARTNER OR EX-PARTNER?: NO

## 2025-05-03 SDOH — SOCIAL STABILITY: SOCIAL INSECURITY
WITHIN THE LAST YEAR, HAVE YOU BEEN KICKED, HIT, SLAPPED, OR OTHERWISE PHYSICALLY HURT BY YOUR PARTNER OR EX-PARTNER?: NO

## 2025-05-03 SDOH — SOCIAL STABILITY: SOCIAL INSECURITY: ABUSE: ADULT

## 2025-05-03 SDOH — SOCIAL STABILITY: SOCIAL INSECURITY: DO YOU FEEL UNSAFE GOING BACK TO THE PLACE WHERE YOU ARE LIVING?: UNABLE TO ASSESS

## 2025-05-03 SDOH — ECONOMIC STABILITY: HOUSING INSECURITY: IN THE LAST 12 MONTHS, WAS THERE A TIME WHEN YOU WERE NOT ABLE TO PAY THE MORTGAGE OR RENT ON TIME?: NO

## 2025-05-03 SDOH — ECONOMIC STABILITY: HOUSING INSECURITY: AT ANY TIME IN THE PAST 12 MONTHS, WERE YOU HOMELESS OR LIVING IN A SHELTER (INCLUDING NOW)?: NO

## 2025-05-03 SDOH — ECONOMIC STABILITY: FOOD INSECURITY: HOW HARD IS IT FOR YOU TO PAY FOR THE VERY BASICS LIKE FOOD, HOUSING, MEDICAL CARE, AND HEATING?: NOT HARD AT ALL

## 2025-05-03 SDOH — SOCIAL STABILITY: SOCIAL INSECURITY: DO YOU FEEL ANYONE HAS EXPLOITED OR TAKEN ADVANTAGE OF YOU FINANCIALLY OR OF YOUR PERSONAL PROPERTY?: UNABLE TO ASSESS

## 2025-05-03 SDOH — SOCIAL STABILITY: SOCIAL INSECURITY: HAVE YOU HAD THOUGHTS OF HARMING ANYONE ELSE?: NO

## 2025-05-03 SDOH — ECONOMIC STABILITY: FOOD INSECURITY: WITHIN THE PAST 12 MONTHS, THE FOOD YOU BOUGHT JUST DIDN'T LAST AND YOU DIDN'T HAVE MONEY TO GET MORE.: NEVER TRUE

## 2025-05-03 SDOH — ECONOMIC STABILITY: TRANSPORTATION INSECURITY
IN THE PAST 12 MONTHS, HAS LACK OF TRANSPORTATION KEPT YOU FROM MEDICAL APPOINTMENTS OR FROM GETTING MEDICATIONS?: PATIENT UNABLE TO ANSWER

## 2025-05-03 SDOH — SOCIAL STABILITY: SOCIAL INSECURITY: HAVE YOU HAD ANY THOUGHTS OF HARMING ANYONE ELSE?: UNABLE TO ASSESS

## 2025-05-03 ASSESSMENT — COGNITIVE AND FUNCTIONAL STATUS - GENERAL
CLIMB 3 TO 5 STEPS WITH RAILING: A LOT
EATING MEALS: A LITTLE
DRESSING REGULAR UPPER BODY CLOTHING: A LOT
DAILY ACTIVITIY SCORE: 14
MOBILITY SCORE: 14
MOVING FROM LYING ON BACK TO SITTING ON SIDE OF FLAT BED WITH BEDRAILS: A LITTLE
PATIENT BASELINE BEDBOUND: NO
PERSONAL GROOMING: A LITTLE
EATING MEALS: A LITTLE
MOVING FROM LYING ON BACK TO SITTING ON SIDE OF FLAT BED WITH BEDRAILS: A LITTLE
PERSONAL GROOMING: A LITTLE
HELP NEEDED FOR BATHING: A LOT
MOVING TO AND FROM BED TO CHAIR: A LOT
TOILETING: A LOT
DRESSING REGULAR LOWER BODY CLOTHING: A LOT
HELP NEEDED FOR BATHING: A LOT
WALKING IN HOSPITAL ROOM: A LOT
PERSONAL GROOMING: A LITTLE
MOVING FROM LYING ON BACK TO SITTING ON SIDE OF FLAT BED WITH BEDRAILS: A LITTLE
DRESSING REGULAR LOWER BODY CLOTHING: A LOT
HELP NEEDED FOR BATHING: A LOT
STANDING UP FROM CHAIR USING ARMS: A LOT
MOVING TO AND FROM BED TO CHAIR: A LOT
MOBILITY SCORE: 14
TOILETING: A LOT
EATING MEALS: A LITTLE
WALKING IN HOSPITAL ROOM: A LOT
DRESSING REGULAR UPPER BODY CLOTHING: A LOT
DRESSING REGULAR UPPER BODY CLOTHING: A LOT
DAILY ACTIVITIY SCORE: 14
DRESSING REGULAR LOWER BODY CLOTHING: A LOT
TURNING FROM BACK TO SIDE WHILE IN FLAT BAD: A LITTLE
TOILETING: A LOT
TURNING FROM BACK TO SIDE WHILE IN FLAT BAD: A LITTLE
CLIMB 3 TO 5 STEPS WITH RAILING: A LOT
STANDING UP FROM CHAIR USING ARMS: A LOT
DAILY ACTIVITIY SCORE: 14

## 2025-05-03 ASSESSMENT — ACTIVITIES OF DAILY LIVING (ADL)
TOILETING: UNABLE TO ASSESS
HEARING - LEFT EAR: DIFFICULTY WITH NOISE
FEEDING YOURSELF: UNABLE TO ASSESS
BATHING: UNABLE TO ASSESS
LACK_OF_TRANSPORTATION: PATIENT UNABLE TO ANSWER
WALKS IN HOME: UNABLE TO ASSESS
GROOMING: UNABLE TO ASSESS
ADEQUATE_TO_COMPLETE_ADL: UNABLE TO ASSESS
LACK_OF_TRANSPORTATION: NO
PATIENT'S MEMORY ADEQUATE TO SAFELY COMPLETE DAILY ACTIVITIES?: UNABLE TO ASSESS
HEARING - RIGHT EAR: DIFFICULTY WITH NOISE
DRESSING YOURSELF: NEEDS ASSISTANCE
ASSISTIVE_DEVICE: EYEGLASSES;DENTURES UPPER;DENTURES LOWER;WALKER
JUDGMENT_ADEQUATE_SAFELY_COMPLETE_DAILY_ACTIVITIES: UNABLE TO ASSESS

## 2025-05-03 ASSESSMENT — LIFESTYLE VARIABLES
AUDIT-C TOTAL SCORE: 0
SKIP TO QUESTIONS 9-10: 1
SKIP TO QUESTIONS 9-10: 1
HOW OFTEN DO YOU HAVE A DRINK CONTAINING ALCOHOL: NEVER
SKIP TO QUESTIONS 9-10: 1
HOW MANY STANDARD DRINKS CONTAINING ALCOHOL DO YOU HAVE ON A TYPICAL DAY: PATIENT DOES NOT DRINK
HOW OFTEN DO YOU HAVE 6 OR MORE DRINKS ON ONE OCCASION: NEVER
AUDIT-C TOTAL SCORE: 0

## 2025-05-03 ASSESSMENT — PATIENT HEALTH QUESTIONNAIRE - PHQ9
2. FEELING DOWN, DEPRESSED OR HOPELESS: NOT AT ALL
1. LITTLE INTEREST OR PLEASURE IN DOING THINGS: NOT AT ALL
SUM OF ALL RESPONSES TO PHQ9 QUESTIONS 1 & 2: 0

## 2025-05-03 ASSESSMENT — PAIN SCALES - GENERAL: PAINLEVEL_OUTOF10: 3

## 2025-05-03 NOTE — H&P
Medical Group History and Physical  ASSESSMENT & PLAN:   Gracie Carlson is a 89 y.o. female admitted to [facility] for management of:    NSTEMI  - EKG showed sinus tachycardia with PACs, no ST elevations  - hsTnT 5657, 5893, 5726;   - Aspirin 325 mg PO loading dose, then 81 mg PO daily  - Continue home atorvastatin  - Low intensity heparin infusion per protocol  - Continuous telemetry monitoring  - Cardiology consulted, will keep NPO  - Echocardiogram to assess wall motion abnormalities and LV function     Generalized weakness  Frequent falls  - Family reported she has been falling frequently  - PT/OT    Chronic Medical Conditions  HTN, CAD, HLD, COPD, CHIQUIS  - resume home medications    VTE Prophylaxis: Heparin gtt    Disposition: Anticipate LOS . 2 midnights    Level of MDM:  High   Risk: High   Data Reviewed and/or Analyzed:   Included: Prior external notes from at least 1 unique source, Results, including laboratory findings and imaging reports, listed above, Orders and notes from all consultants involved, and Notes for this encounter.  I personally reviewed the tests referenced above.    The patient/family had opportunity to ask questions. All questions were answered to the best of my ability.    Temo Choudhary, [facility] Medicine    HISTORY OF PRESENT ILLNESS:   Chief Complaint: Shortness of breath    History Of Present Illness:    Gracie Carlson is a 89 y.o. female with a significant past medical history of HTN, CAD, HLD, COPD, CHIQUIS, who presented to [facility] due to shortness of breath. Patient states that she first noticed becoming more short of breath around 2 weeks ago. Her breathing difficulty become intolerable today and was not improving with her home breathing treatments. Patient states she did not experience relief until she received steroid and breathing treatment while in the ambulance. She is now feeling back to her normal. She denies experiencing chest pain, palpitations, and  has not noticed swelling in her legs.     Review of systems: 10 point review of systems is otherwise negative except as mentioned above.    PAST HISTORIES:     Past Medical History:  She has a past medical history of Hypothyroidism, unspecified (01/15/2016), Personal history of diseases of the blood and blood-forming organs and certain disorders involving the immune mechanism (11/13/2015), Personal history of other diseases of the circulatory system (01/15/2016), Personal history of other diseases of the circulatory system (01/15/2016), Personal history of other diseases of the musculoskeletal system and connective tissue (11/13/2015), and Pulmonary hypertension, unspecified (Multi) (01/15/2016).    Past Surgical History:  She has a past surgical history that includes Coronary angioplasty (01/15/2016); Hysterectomy (01/15/2016); Aortic valve replacement (01/15/2016); Esophagogastroduodenoscopy (01/15/2016); Knee surgery (01/15/2016); Other surgical history (05/19/2022); Other surgical history (05/19/2022); Other surgical history (05/19/2022); Other surgical history (05/19/2022); Other surgical history (05/19/2022); CT angio abdomen pelvis w and or wo IV IV contrast (11/6/2015); MR angio head wo IV contrast (1/22/2023); and MR angio neck wo IV contrast (1/22/2023).      Social History:  She reports that she has quit smoking. Her smoking use included cigarettes. She has never used smokeless tobacco. She reports that she does not drink alcohol and does not use drugs.    Family History:  Family History[1]     Allergies:  Oxycodone and Sulfa (sulfonamide antibiotics)    OBJECTIVE:     Last Recorded Vitals:  Vitals:    05/02/25 2131 05/02/25 2200 05/02/25 2204 05/02/25 2310   BP: 97/50 (!) 89/49 95/55 95/52   BP Location:    Left arm   Patient Position:       Pulse: (!) 110 (!) 108 (!) 108 100   Resp:   15 16   Temp:    37 °C (98.6 °F)   TempSrc:    Temporal   SpO2: 97% 97% 98% 99%   Weight:       Height:         Last  I/O:  No intake/output data recorded.    Physical Exam  Vitals and nursing note reviewed.   Constitutional:       General: She is not in acute distress.     Appearance: Normal appearance.   HENT:      Head: Normocephalic.      Mouth/Throat:      Mouth: Mucous membranes are moist.   Eyes:      Extraocular Movements: Extraocular movements intact.      Conjunctiva/sclera: Conjunctivae normal.   Cardiovascular:      Rate and Rhythm: Normal rate and regular rhythm.      Pulses: Normal pulses.      Heart sounds: Normal heart sounds.   Pulmonary:      Effort: Pulmonary effort is normal. No respiratory distress.      Breath sounds: Wheezing present.   Abdominal:      General: Abdomen is flat. Bowel sounds are normal.      Palpations: Abdomen is soft.   Musculoskeletal:      Right lower leg: No edema.      Left lower leg: No edema.   Skin:     General: Skin is warm.      Findings: Bruising present.   Neurological:      General: No focal deficit present.      Mental Status: She is alert. Mental status is at baseline.   Psychiatric:         Mood and Affect: Mood normal.         Behavior: Behavior normal.         Thought Content: Thought content normal.         Scheduled Medications  Scheduled Medications[2]  PRN Medications  PRN Medications[3]  Continuous Medications  Continuous Medications[4]    Outpatient Medications:  Prior to Admission medications    Medication Sig Start Date End Date Taking? Authorizing Provider   ascorbic acid (Vitamin C) 500 mg tablet Take 1 tablet (500 mg) by mouth once daily.    Historical Provider, MD   atorvastatin (Lipitor) 20 mg tablet Take 1 tablet (20 mg) by mouth once daily at bedtime. 10/1/15   Historical Provider, MD   bisacodyl (Dulcolax) 5 mg EC tablet Take 1 tablet (5 mg) by mouth if needed. 3/10/22   Historical Provider, MD   fluticasone propion-salmeteroL (Advair Diskus) 100-50 mcg/dose diskus inhaler Inhale 1 puff 2 times a day. Rinse mouth with water after use to reduce aftertaste and  incidence of candidiasis. Do not swallow.    Historical Provider, MD   fluticasone/umeclidin/vilanter (TRELEGY ELLIPTA INHL) Inhale 1 puff once daily.    Historical Provider, MD   losartan (Cozaar) 50 mg tablet Take 1 tablet (50 mg) by mouth once daily. 12/9/24 12/9/25  Charles Barrera MD   meclizine (Antivert) 12.5 mg tablet Take 1 tablet (12.5 mg) by mouth 3 times a day as needed for dizziness.    Historical Provider, MD   multivitamin tablet Take 1 tablet by mouth once daily.    Historical Provider, MD   nitroglycerin (Nitrostat) 0.4 mg SL tablet Place 1 tablet (0.4 mg) under the tongue every 5 minutes if needed for chest pain (FOR UP TO 3 DOSES FOR CHEST PAIN. CALL 911 IF PAIN PERSISTS). 6/26/24 6/26/25  Jose Harris MD   ondansetron (Zofran) 4 mg tablet Take 1 tablet (4 mg) by mouth every 8 hours if needed for nausea or vomiting.    Historical Provider, MD   ondansetron (Zofran) 4 mg tablet Take 1 tablet (4 mg) by mouth every 8 hours if needed for nausea or vomiting.  Patient not taking: Reported on 6/26/2024 4/24/24   WAQAS Harper   pantoprazole (ProtoNix) 40 mg EC tablet Take 1 tablet (40 mg) by mouth once daily in the morning. Take before meals. Do not crush, chew, or split.  Patient not taking: Reported on 6/26/2024 4/25/24 5/25/24  WAQAS Harper   polyethylene glycol (Miralax) 17 gram/dose powder Take 17 g by mouth once daily as needed.    Historical Provider, MD   tiotropium (Spiriva) 18 mcg inhalation capsule Place 1 capsule (18 mcg) into inhaler and inhale if needed.    Historical Provider, MD       LABS AND IMAGING:     Labs:  Results from last 7 days   Lab Units 05/02/25  1840   WBC AUTO x10*3/uL 14.3*   RBC AUTO x10*6/uL 3.68*   HEMOGLOBIN g/dL 10.7*   HEMATOCRIT % 31.9*   MCV fL 87   MCH pg 29.1   MCHC g/dL 33.5   RDW % 13.2   PLATELETS AUTO x10*3/uL 129*     Results from last 7 days   Lab Units 05/02/25  1840   SODIUM mmol/L 131*   POTASSIUM mmol/L 4.1    CHLORIDE mmol/L 102   CO2 mmol/L 21   BUN mg/dL 28*   CREATININE mg/dL 1.00   GLUCOSE mg/dL 142*   PROTEIN TOTAL g/dL 6.1*   CALCIUM mg/dL 8.6   BILIRUBIN TOTAL mg/dL 0.9   ALK PHOS U/L 57   AST U/L 31   ALT U/L 20     Results from last 7 days   Lab Units 05/02/25  1840   MAGNESIUM mg/dL 1.51*     Results from last 7 days   Lab Units 05/02/25  2336 05/02/25  1952 05/02/25  1840   TROPHS ng/L 5,726* 5,893* 5,657*       Imaging:  CT angio chest for pulmonary embolism  Narrative: STUDY:  CT Angiogram of the Chest; 05/02/2025 08:04PM  INDICATION:  Short of breath, tachycardia, elevated top.  COMPARISON:  CT CAP 04/16/2024; XR Chest 05/02/2025, 11/19/2024.  ACCESSION NUMBER(S):  [id number]  ORDERING CLINICIAN:  LOKESH GILBERT  TECHNIQUE:  CTA of the chest was performed with intravenous contrast.   Images are reviewed and processed at a workstation according to the CT  angiogram protocol with 3-D and/or MIP post processing imaging  generated.  Omnipaque 350-75 mL was administered intravenously.  1090  DICOM images received.  Automated mA/kV exposure control was utilized and patient examination  was performed in strict accordance with principles of ALARA.  FINDINGS:  Pulmonary arteries are adequately opacified without acute or chronic  filling defects.  The thoracic aorta is normal in course and caliber  without dissection or aneurysm.  The heart is enlarged without pericardial effusion.  There are  postprocedural changes from transcapillary aortic valve replacement.  Thoracic lymph nodes are not enlarged.  There is a mild right and trace left pleural effusion.  Mild pulmonary  edema.  There is calcification of the mitral annulus.  No  pneumothorax.  The airways are patent.  No suspicious nodules.  Upper abdomen demonstrates no acute pathology.  There is significant  atherosclerotic disease of the abdominal aorta  There are no acute fractures.  No suspicious bony lesions.  Mild  convexity of the thoracic spine to the  right of the midline.  Impression: 1. No acute or chronic pulmonary emboli.  2. No thoracic aortic aneurysm or dissection.  3. Cardiomegaly with mild pulmonary edema.  4. Mild right and trace left pleural effusion.  5. Postprocedural changes from transcapillary aortic valve  replacement.  Signed by Loyd Plascencia MD  XR chest 1 view  Narrative: STUDY:  Chest Radiograph;  5/2/2025 6:37 PM  INDICATION:  Chest pain.  COMPARISON:  11/19/2024 XR Chest.  ACCESSION NUMBER(S):  [id number]  ORDERING CLINICIAN:  LOKESH GILBERT  TECHNIQUE:  Frontal chest was obtained at 18:36 hours.  FINDINGS:  CARDIOMEDIASTINAL SILHOUETTE:  Cardiomediastinal silhouette is normal in size and configuration.     LUNGS:  Lungs are clear.     ABDOMEN:  No remarkable upper abdominal findings.     BONES:  No acute osseous changes.  Impression: No acute pulmonary abnormality.  Signed by Jose Adamson MD  ECG 12 lead  Sinus tachycardia with Premature atrial complexes  Left axis deviation  Anterior infarct , age undetermined  Abnormal ECG  When compared with ECG of 19-NOV-2024 10:36,  Premature ventricular complexes are no longer Present  Premature atrial complexes are now Present  Vent. rate has increased BY  44 BPM             [1]   Family History  Problem Relation Name Age of Onset    Hypertension Mother      Heart disease Father          RHEUMATIC    Hypertension Father      Coronary artery disease Father      Other (ENDOCARDITIS) Father     [2] aspirin, 81 mg, oral, Daily  atorvastatin, 20 mg, oral, Nightly  fluticasone furoate-vilanteroL, 1 puff, inhalation, Daily  oxygen, , inhalation, Continuous - Inhalation  tiotropium, 2 puff, inhalation, Daily     [3] PRN medications: acetaminophen **OR** acetaminophen **OR** acetaminophen, heparin, ipratropium-albuteroL, menthol-zinc oxide, nitroglycerin  [4] heparin, 0-4,000 Units/hr, Last Rate: 600 Units/hr (05/02/25 2201)

## 2025-05-03 NOTE — CONSULTS
Cardiology Consult Note      Date:   5/3/2025  Patient name:  Gracie Carlson  Date of admission:  5/2/2025  6:24 PM  MRN:   [id number]  YOB: 1928  Time of Consult:  8:59 AM          Admission Diagnosis:     NSTEMI (non-ST elevated myocardial infarction) (Multi)      History of Present Illness:      Gracie Carlson is a 89 y.o. past medical history significant for coronary artery disease remote PCI/angioplasty in her 50s, aortic valve stenosis, TAVR November 25, 2015, mitral valve stenosis, mitral valve regurgitation, hypertension, hyperlipidemia, COPD, hypothyroid, anemia, pulmonary artery hypertension, rheumatoid arthritis who presents with chest pain and dyspnea.    Patient is resting comfortably in bed.  She has difficulty describing her symptoms.  She had some level of just chest discomfort and possible dyspnea.  She reports that she just did not feel good.  Denies palpitations, nausea, vomiting, dizziness, near-syncope, deshawn syncope.  She has occasional edema.    Past surgical history:  TAVR November 25, 2015  Knee surgery  Hysterectomy    Social history:  Former tobacco use  Denies alcohol use    Family history:  Mother history of heart disease  Mother history of hypertension    Review of systems have been reviewed and are negative, noncontributory, as previously mentioned x 12 systems.    I personally reviewed EKG and chest x-ray:  May 2 EKG: Sinus tachycardia 116 bpm, septal Q waves  May 2 chest x-ray: No acute cardiopulmonary disease    Labs: Troponin 5657  5801-3, 5726  BNP 1000  White blood cell 15.7  Hemoglobin 10.3  Creatinine    Allergies:     Allergies[1]      Past Medical History:     Medical History[2]    Past Surgical History:     Surgical History[3]    Family History:     Family History[4]    Social History:     Social History[5]    CURRENT HOSPITAL MEDICATIONS    Scheduled Medications[6]  Continuous Medications[7]  Current Outpatient Medications   Medication Instructions     ascorbic acid (VITAMIN C) 500 mg, oral, Daily    atorvastatin (Lipitor) 20 mg tablet 1 tablet, Nightly    bisacodyl (Dulcolax) 5 mg EC tablet 1 tablet, oral, As needed    cholecalciferol (VITAMIN D3) 25 mcg, oral, Daily    cyanocobalamin (VITAMIN B-12) 1,000 mcg, oral, Daily    fluticasone propion-salmeteroL (Advair Diskus) 100-50 mcg/dose diskus inhaler 1 puff, 2 times daily    fluticasone/umeclidin/vilanter (TRELEGY ELLIPTA INHL) 1 puff, inhalation, Daily    levothyroxine (SYNTHROID, LEVOXYL) 75 mcg, oral, Daily, Take on an empty stomach at the same time each day, either 30 to 60 minutes prior to breakfast    losartan (COZAAR) 50 mg, oral, Daily    multivitamin tablet 1 tablet, Daily    nitroglycerin (NITROSTAT) 0.4 mg, sublingual, Every 5 min PRN    ondansetron (ZOFRAN) 4 mg, oral, Every 8 hours PRN    pantoprazole (PROTONIX) 40 mg, oral, Daily before breakfast, Do not crush, chew, or split.    polyethylene glycol (MIRALAX) 17 g, Daily PRN    tiotropium (Spiriva) 18 mcg inhalation capsule 1 capsule, inhalation, As needed        Review of Systems:      12 point review of systems was obtained in detail and is negative other than that detailed above.      Vital Signs:     Vitals:    05/02/25 2204 05/02/25 2310 05/03/25 0331 05/03/25 0853   BP: 95/55 95/52 106/58 110/56   BP Location:  Left arm     Patient Position:       Pulse: (!) 108 100 97 89   Resp: 15 16     Temp:  37 °C (98.6 °F) 36.7 °C (98.1 °F) 36.7 °C (98.1 °F)   TempSrc:  Temporal     SpO2: 98% 99% 100% 98%   Weight:   50.5 kg (111 lb 4.8 oz)    Height:         No intake or output data in the 24 hours ending 05/03/25 0859    Wt Readings from Last 4 Encounters:   05/03/25 50.5 kg (111 lb 4.8 oz)   11/19/24 49 kg (108 lb)   06/26/24 50 kg (110 lb 4.8 oz)   04/19/24 50 kg (110 lb 3.7 oz)       Physical Examination:     GENERAL APPEARANCE: Well developed, well nourished, in no acute distress.  CHEST: Symmetric and non-tender.  INTEGUMENT: Skin warm and dry,  without gross excoriationis or lesions.  HEENT: No gross abnormalities of conjunctiva, teeth, gums, oral mucosa  NECK: Supple, no JVD, no bruit. Thyroid not palpable. Carotid upstrokes normal.  NEURO/PSHCY: Alert,   LUNGS: Decreased breath sounds right basilar  HEART: Rate and rhythm regular systolic ejection murmur EXTR ordered  ABDOMEN: Soft, nontender, no palpable hepatosplenomegaly, no mases, no bruits. Abdominal aorta not noted to be enlarged.  EXTREMITIES: Warm with good color, no clubbing or cyanois. There is no edema noted.  PERIPHERAL VASCULAR: Pulses present and equally palpable    Lab:     CBC:   Lab Results   Component Value Date    WBC 15.7 (H) 05/03/2025    RBC 3.48 (L) 05/03/2025    HGB 10.3 (L) 05/03/2025    HCT 29.9 (L) 05/03/2025     (L) 05/03/2025        CMP:    Lab Results   Component Value Date     (L) 05/03/2025    K 4.4 05/03/2025     05/03/2025    CO2 19 (L) 05/03/2025    BUN 30 (H) 05/03/2025    CREATININE 1.01 05/03/2025    GLUCOSE 195 (H) 05/03/2025    CALCIUM 8.3 (L) 05/03/2025       BMP:  Lab Results   Component Value Date     (L) 05/03/2025     (L) 05/02/2025    K 4.4 05/03/2025    K 4.1 05/02/2025     05/03/2025     05/02/2025    CO2 19 (L) 05/03/2025    CO2 21 05/02/2025    BUN 30 (H) 05/03/2025    BUN 28 (H) 05/02/2025    CREATININE 1.01 05/03/2025    CREATININE 1.00 05/02/2025       CBC:  Lab Results   Component Value Date    WBC 15.7 (H) 05/03/2025    WBC 14.3 (H) 05/02/2025    RBC 3.48 (L) 05/03/2025    RBC 3.68 (L) 05/02/2025    HGB 10.3 (L) 05/03/2025    HGB 10.7 (L) 05/02/2025    HCT 29.9 (L) 05/03/2025    HCT 31.9 (L) 05/02/2025    MCV 86 05/03/2025    MCV 87 05/02/2025    MCH 29.6 05/03/2025    MCH 29.1 05/02/2025    MCHC 34.4 05/03/2025    MCHC 33.5 05/02/2025    RDW 13.4 05/03/2025    RDW 13.2 05/02/2025     (L) 05/03/2025     (L) 05/02/2025       Hepatic Function Panel:    Lab Results   Component Value Date     "ALKPHOS 57 05/02/2025    ALT 20 05/02/2025    AST 31 05/02/2025    PROT 6.1 (L) 05/02/2025    BILITOT 0.9 05/02/2025       Magnesium:    Lab Results   Component Value Date    MG 2.34 05/03/2025       Lipid Profile:    Lab Results   Component Value Date    TRIG 78 05/02/2025    HDL 63.7 05/02/2025    LDLCALC 59 05/02/2025       Cardiac Enzymes:    Lab Results   Component Value Date    TROPHS 5,726 (HH) 05/02/2025    TROPHS 5,893 (HH) 05/02/2025    TROPHS 5,657 (HH) 05/02/2025       TSH:    No results found for: \"TSH\"    BNP:   Lab Results   Component Value Date     (H) 05/02/2025        PT/INR:    Lab Results   Component Value Date    PROTIME 11.9 05/02/2025    INR 1.1 05/02/2025       HgBA1c:    No results found for: \"HGBA1C\"      Diagnostic Studies:     ECG 12 lead  Result Date: 5/3/2025  Normal sinus rhythm Left axis deviation Abnormal ECG When compared with ECG of 02-MAY-2025 19:48, (unconfirmed) Premature atrial complexes are no longer Present QRS axis Shifted left ST elevation now present in Lateral leads T wave inversion now evident in Inferior leads    CT angio chest for pulmonary embolism  Result Date: 5/2/2025  STUDY: CT Angiogram of the Chest; 05/02/2025 08:04PM INDICATION: Short of breath, tachycardia, elevated top. COMPARISON: CT CAP 04/16/2024; XR Chest 05/02/2025, 11/19/2024. ACCESSION NUMBER(S): [id number] ORDERING CLINICIAN: LOKSEH GILBERT TECHNIQUE:  CTA of the chest was performed with intravenous contrast. Images are reviewed and processed at a workstation according to the CT angiogram protocol with 3-D and/or MIP post processing imaging generated.  Omnipaque 350-75 mL was administered intravenously.  1090 DICOM images received. Automated mA/kV exposure control was utilized and patient examination was performed in strict accordance with principles of ALARA. FINDINGS: Pulmonary arteries are adequately opacified without acute or chronic filling defects.  The thoracic aorta is normal in " course and caliber without dissection or aneurysm. The heart is enlarged without pericardial effusion.  There are postprocedural changes from transcapillary aortic valve replacement. Thoracic lymph nodes are not enlarged. There is a mild right and trace left pleural effusion.  Mild pulmonary edema.  There is calcification of the mitral annulus.  No pneumothorax.  The airways are patent. No suspicious nodules. Upper abdomen demonstrates no acute pathology.  There is significant atherosclerotic disease of the abdominal aorta There are no acute fractures.  No suspicious bony lesions.  Mild convexity of the thoracic spine to the right of the midline.    1. No acute or chronic pulmonary emboli. 2. No thoracic aortic aneurysm or dissection. 3. Cardiomegaly with mild pulmonary edema. 4. Mild right and trace left pleural effusion. 5. Postprocedural changes from transcapillary aortic valve replacement. Signed by Loyd Plascencia MD    XR chest 1 view  Result Date: 5/2/2025  STUDY: Chest Radiograph;  5/2/2025 6:37 PM INDICATION: Chest pain. COMPARISON: 11/19/2024 XR Chest. ACCESSION NUMBER(S): [id number] ORDERING CLINICIAN: LOKESH GILBERT TECHNIQUE:  Frontal chest was obtained at 18:36 hours. FINDINGS: CARDIOMEDIASTINAL SILHOUETTE: Cardiomediastinal silhouette is normal in size and configuration.  LUNGS: Lungs are clear.  ABDOMEN: No remarkable upper abdominal findings.  BONES: No acute osseous changes.    No acute pulmonary abnormality. Signed by Jose Adamson MD    ECG 12 lead  Result Date: 5/2/2025  Sinus tachycardia with Premature atrial complexes Left axis deviation Anterior infarct , age undetermined Abnormal ECG When compared with ECG of 19-NOV-2024 10:36, Premature ventricular complexes are no longer Present Premature atrial complexes are now Present Vent. rate has increased BY  44 BPM      Radiology:     CT angio chest for pulmonary embolism   Final Result   1. No acute or chronic pulmonary emboli.   2. No  thoracic aortic aneurysm or dissection.   3. Cardiomegaly with mild pulmonary edema.   4. Mild right and trace left pleural effusion.   5. Postprocedural changes from transcapillary aortic valve   replacement.   Signed by Loyd Plascencia MD      XR chest 1 view   Final Result   No acute pulmonary abnormality.   Signed by Jose Adamson MD      Transthoracic Echo Complete    (Results Pending)       Problem List:     Problem List[8]    Assessment:       NSTEMI  Ischemic cardiomyopathy with severe depressed systolic function  Acute systolic congestive heart failure  Aortic valve stenosis/TAVR November 25, 2015  Coronary artery disease/PCI/angioplasty mid 50s  Mitral valve stenosis/mitral valve regurgitation  Hypertension  Hyperlipidemia  COPD  Hypothyroidism  Rheumatoid arthritis  Pulmonary hypertension      Plan:     Echo reveals estimated ejection fraction 35%.  This is a change compared to her previous echo 2/19/2024 which revealed an EF of 63%.  She also has progression of her valvular heart disease with probable severe mitral valve regurgitation.    I had a detailed discussion with patient's son Andrea, and daughter-in-law about her cardiac condition and treatment options.  Patient apparently is a DNR documented at home.  They prefer conservative management.  Do not want invasive procedures such as a cardiac catheterization.  At this time we will adjust medical therapy:  Stop heparin  Start aspirin/Plavix  Low-dose beta-blocker    Gradually adjust medical therapy as hemodynamics allow          Thank you for the opportunity to participate in the care of your patient.  Please do not hesitate to call if you have any questions.    Electronically signed by Manish Emanuel DO, on 5/3/2025 at 8:59 AM       [1]   Allergies  Allergen Reactions    Oxycodone Unknown    Sulfa (Sulfonamide Antibiotics) Unknown   [2]   Past Medical History:  Diagnosis Date    Hypothyroidism, unspecified 01/15/2016    Hypothyroidism    Personal  history of diseases of the blood and blood-forming organs and certain disorders involving the immune mechanism 11/13/2015    History of anemia    Personal history of other diseases of the circulatory system 01/15/2016    History of coronary atherosclerosis    Personal history of other diseases of the circulatory system 01/15/2016    History of hypertension    Personal history of other diseases of the musculoskeletal system and connective tissue 11/13/2015    History of rheumatoid arthritis    Pulmonary hypertension, unspecified (Multi) 01/15/2016    Pulmonary hypertension   [3]   Past Surgical History:  Procedure Laterality Date    AORTIC VALVE REPLACEMENT  01/15/2016    Aortic Valve Replacement    CORONARY ANGIOPLASTY  01/15/2016    PTCA    CT ABDOMEN PELVIS ANGIOGRAM W AND/OR WO IV CONTRAST  11/6/2015    CT ABDOMEN PELVIS ANGIOGRAM W AND/OR WO IV CONTRAST 11/6/2015 [facility]    ESOPHAGOGASTRODUODENOSCOPY  01/15/2016    Diagnostic Esophagogastroduodenoscopy    HYSTERECTOMY  01/15/2016    Hysterectomy    KNEE SURGERY  01/15/2016    Knee Surgery Left    MR HEAD ANGIO WO IV CONTRAST  1/22/2023    MR HEAD ANGIO WO IV CONTRAST 1/22/2023 [facility]    MR NECK ANGIO WO IV CONTRAST  1/22/2023    MR NECK ANGIO WO IV CONTRAST 1/22/2023 [facility]    OTHER SURGICAL HISTORY  05/19/2022    Appendectomy    OTHER SURGICAL HISTORY  05/19/2022    Cataract surgery    OTHER SURGICAL HISTORY  05/19/2022    Colonoscopy    OTHER SURGICAL HISTORY  05/19/2022    Hip replacement    OTHER SURGICAL HISTORY  05/19/2022    Tonsillectomy   [4]   Family History  Problem Relation Name Age of Onset    Hypertension Mother      Heart disease Father          RHEUMATIC    Hypertension Father      Coronary artery disease Father      Other (ENDOCARDITIS) Father     [5]   Social History  Tobacco Use    Smoking status: Former     Types: Cigarettes    Smokeless tobacco: Never   Substance Use Topics    Alcohol use: Never     Drug use: Never   [6] aspirin, 81 mg, oral, Daily  atorvastatin, 20 mg, oral, Nightly  clopidogrel, 75 mg, oral, Daily  fluticasone furoate-vilanteroL, 1 puff, inhalation, Daily  metoprolol succinate XL, 12.5 mg, oral, Daily  oxygen, , inhalation, Continuous - Inhalation  perflutren lipid microspheres, 1.5 mL of dilution, intravenous, Once in imaging  perflutren protein A microsphere, 0.5 mL, intravenous, Once in imaging  sulfur hexafluoride microsphr, 2 mL, intravenous, Once in imaging  tiotropium, 2 puff, inhalation, Daily  [7]    [8]   Patient Active Problem List  Diagnosis    Asthma    Balance problem    Benign essential HTN    CAD S/P percutaneous coronary angioplasty    Chronic diastolic congestive heart failure    Chronic obstructive pulmonary disease (Multi)    Dysphonia    Eye problem    Generalized arthritis    Hypothyroidism    Mitral valve stenosis    Mixed hyperlipidemia    Periprosth fracture around unsp internal prosth joint, subs    Localized, primary osteoarthritis of shoulder region    Rotator cuff tear arthropathy of right shoulder    Primary osteoarthritis of right shoulder    Pulmonary hypertension (Multi)    S/P TAVR (transcatheter aortic valve replacement)    Weakness of both hips    Former smoker    Fall, initial encounter    [place]-prosthetic fracture around prosthetic hip    Impaired mobility and ADLs    Pain    Slow transit constipation    Bright red blood per rectum    BMI 21.0-21.9, adult    NSTEMI (non-ST elevated myocardial infarction) (Multi)

## 2025-05-03 NOTE — PROGRESS NOTES
ASSESSMENT & PLAN:     89F with PM of HTN, DLD, CAD s/p remote PCI, aortic stenosis s/p TAVR, mitral stenosis, mitral regurgitation, COPD, hypothyroidism, chronic Hermila, pHTN, RA who presented with CP. Found to have NSTEMI.     NSTEMI  ICM  Acute HFrEF  Acute hypoxemic respiratory failure likely 2/2 above  CAD s/p remote PCI  Mitral valve stenosis and regurgitation  HTN  DLD  COPD  Hypothyroidism  RA  pHTN  Frequent falls  Hyponatremia  Thrombocytopenia    -cardiology following  -had GOC discussions with pt son, who is POA, and they would like conservative mgmt given her advanced age, frailty, dementia, and other chronic medical comorbidities. They would not like anything invasive like C. Cardiology aware. Will manage medically  -stop hep gtt  -cont ASA 81, start Plavix  -cont statin  -start low dose BB  -TTE done, official read pending, but per cardiology, has newly worsened EF to 35%, and progression of her valvular heart disease with severe MR  -gentle diuresis with Lasix 20mg IV x1 today. Monitor I/os.   -will defer to cardiology on further medication adjustments, but imagine probably will not be too aggressive  -tele  -wean o2 as tolerated  -PT/OT eval pending, family would like snf placement     Vte ppx lovenox  DNR/DNI, no ICU  Dispo tbd, likely snf    Samir Gama MD    SUBJECTIVE     NAEON. Still somewhat dyspneic. Appears comfortable however.       OBJECTIVE:       Last Recorded Vitals:  Vitals:    05/02/25 2204 05/02/25 2310 05/03/25 0331 05/03/25 0853   BP: 95/55 95/52 106/58 110/56   BP Location:  Left arm     Patient Position:       Pulse: (!) 108 100 97 89   Resp: 15 16     Temp:  37 °C (98.6 °F) 36.7 °C (98.1 °F) 36.7 °C (98.1 °F)   TempSrc:  Temporal     SpO2: 98% 99% 100% 98%   Weight:   50.5 kg (111 lb 4.8 oz)    Height:           Last I/O:  No intake/output data recorded.    Physical Exam:  GEN: frail appearing, appears stated age, NAD  CV: RRR, no m/r/g, no LE edema  LUNGS: reduced breath  sounds, faint crackles  ABD: soft, NT  NEURO: A+Ox3, no FND    Inpatient Medications:  Scheduled Medications[1]    PRN Medications  PRN Medications[2]    Continuous Medications:  Continuous Medications[3]      LABS AND IMAGING:     Labs:  Results for orders placed or performed during the hospital encounter of 05/02/25 (from the past 24 hours)   CBC and Auto Differential   Result Value Ref Range    WBC 14.3 (H) 4.4 - 11.3 x10*3/uL    nRBC 0.0 0.0 - 0.0 /100 WBCs    RBC 3.68 (L) 4.00 - 5.20 x10*6/uL    Hemoglobin 10.7 (L) 12.0 - 16.0 g/dL    Hematocrit 31.9 (L) 36.0 - 46.0 %    MCV 87 80 - 100 fL    MCH 29.1 26.0 - 34.0 pg    MCHC 33.5 32.0 - 36.0 g/dL    RDW 13.2 11.5 - 14.5 %    Platelets 129 (L) 150 - 450 x10*3/uL    Neutrophils % 87.5 40.0 - 80.0 %    Immature Granulocytes %, Automated 0.6 0.0 - 0.9 %    Lymphocytes % 3.1 13.0 - 44.0 %    Monocytes % 8.7 2.0 - 10.0 %    Eosinophils % 0.0 0.0 - 6.0 %    Basophils % 0.1 0.0 - 2.0 %    Neutrophils Absolute 12.50 (H) 1.60 - 5.50 x10*3/uL    Immature Granulocytes Absolute, Automated 0.08 0.00 - 0.50 x10*3/uL    Lymphocytes Absolute 0.45 (L) 0.80 - 3.00 x10*3/uL    Monocytes Absolute 1.24 (H) 0.05 - 0.80 x10*3/uL    Eosinophils Absolute 0.00 0.00 - 0.40 x10*3/uL    Basophils Absolute 0.02 0.00 - 0.10 x10*3/uL   Comprehensive Metabolic Panel   Result Value Ref Range    Glucose 142 (H) 74 - 99 mg/dL    Sodium 131 (L) 136 - 145 mmol/L    Potassium 4.1 3.5 - 5.3 mmol/L    Chloride 102 98 - 107 mmol/L    Bicarbonate 21 21 - 32 mmol/L    Anion Gap 12 10 - 20 mmol/L    Urea Nitrogen 28 (H) 6 - 23 mg/dL    Creatinine 1.00 0.50 - 1.05 mg/dL    eGFR 52 (L) >60 mL/min/1.73m*2    Calcium 8.6 8.6 - 10.3 mg/dL    Albumin 3.6 3.4 - 5.0 g/dL    Alkaline Phosphatase 57 33 - 136 U/L    Total Protein 6.1 (L) 6.4 - 8.2 g/dL    AST 31 9 - 39 U/L    Bilirubin, Total 0.9 0.0 - 1.2 mg/dL    ALT 20 7 - 45 U/L   Magnesium   Result Value Ref Range    Magnesium 1.51 (L) 1.60 - 2.40 mg/dL   B-Type  Natriuretic Peptide   Result Value Ref Range     (H) 0 - 99 pg/mL   Lactate   Result Value Ref Range    Lactate 2.1 (H) 0.4 - 2.0 mmol/L   Protime-INR   Result Value Ref Range    Protime 11.9 9.8 - 12.4 seconds    INR 1.1 0.9 - 1.1   Sars-CoV-2, Influenza A/B and RSV PCR   Result Value Ref Range    Coronavirus 2019, PCR Not Detected Not Detected    Flu A Result Not Detected Not Detected    Flu B Result Not Detected Not Detected    RSV PCR Not Detected Not Detected   Troponin I, High Sensitivity, Initial   Result Value Ref Range    Troponin I, High Sensitivity 5,657 (HH) 0 - 13 ng/L   Lipid Panel   Result Value Ref Range    Cholesterol 138 0 - 199 mg/dL    HDL-Cholesterol 63.7 mg/dL    Cholesterol/HDL Ratio 2.2     LDL Calculated 59 <=99 mg/dL    VLDL 16 0 - 40 mg/dL    Triglycerides 78 0 - 149 mg/dL    Non HDL Cholesterol 74 0 - 149 mg/dL   ECG 12 lead   Result Value Ref Range    Ventricular Rate 121 BPM    Atrial Rate 121 BPM    NM Interval 144 ms    QRS Duration 72 ms    QT Interval 322 ms    QTC Calculation(Bazett) 457 ms    P Axis 81 degrees    R Axis -65 degrees    T Axis 64 degrees    QRS Count 20 beats    Q Onset 218 ms    P Onset 146 ms    P Offset 214 ms    T Offset 379 ms    QTC Fredericia 406 ms   Troponin, High Sensitivity, 1 Hour   Result Value Ref Range    Troponin I, High Sensitivity 5,893 (HH) 0 - 13 ng/L   Lactate   Result Value Ref Range    Lactate 1.5 0.4 - 2.0 mmol/L   aPTT - baseline   Result Value Ref Range    aPTT 31 26 - 36 seconds   Troponin I, High Sensitivity   Result Value Ref Range    Troponin I, High Sensitivity 5,726 (HH) 0 - 13 ng/L   CBC   Result Value Ref Range    WBC 15.7 (H) 4.4 - 11.3 x10*3/uL    nRBC 0.0 0.0 - 0.0 /100 WBCs    RBC 3.48 (L) 4.00 - 5.20 x10*6/uL    Hemoglobin 10.3 (L) 12.0 - 16.0 g/dL    Hematocrit 29.9 (L) 36.0 - 46.0 %    MCV 86 80 - 100 fL    MCH 29.6 26.0 - 34.0 pg    MCHC 34.4 32.0 - 36.0 g/dL    RDW 13.4 11.5 - 14.5 %    Platelets 118 (L) 150 - 450  x10*3/uL   Renal Function Panel   Result Value Ref Range    Glucose 195 (H) 74 - 99 mg/dL    Sodium 131 (L) 136 - 145 mmol/L    Potassium 4.4 3.5 - 5.3 mmol/L    Chloride 102 98 - 107 mmol/L    Bicarbonate 19 (L) 21 - 32 mmol/L    Anion Gap 14 10 - 20 mmol/L    Urea Nitrogen 30 (H) 6 - 23 mg/dL    Creatinine 1.01 0.50 - 1.05 mg/dL    eGFR 51 (L) >60 mL/min/1.73m*2    Calcium 8.3 (L) 8.6 - 10.3 mg/dL    Phosphorus 3.7 2.5 - 4.9 mg/dL    Albumin 3.4 3.4 - 5.0 g/dL   Magnesium   Result Value Ref Range    Magnesium 2.34 1.60 - 2.40 mg/dL   Heparin Assay, UFH   Result Value Ref Range    Heparin Unfractionated 0.5 See Comment Below for Therapeutic Ranges IU/mL   Heparin Assay   Result Value Ref Range    Heparin Unfractionated 0.4 See Comment Below for Therapeutic Ranges IU/mL   Transthoracic Echo Complete   Result Value Ref Range    BSA 1.53 m2   ECG 12 lead   Result Value Ref Range    Ventricular Rate 91 BPM    Atrial Rate 91 BPM    HI Interval 152 ms    QRS Duration 72 ms    QT Interval 356 ms    QTC Calculation(Bazett) 437 ms    P Axis 87 degrees    R Axis -31 degrees    T Axis -8 degrees    QRS Count 15 beats    Q Onset 224 ms    P Onset 148 ms    P Offset 213 ms    T Offset 402 ms    QTC Fredericia 409 ms        Imaging:  ECG 12 lead  Sinus tachycardia with Premature atrial complexes  Left axis deviation  Anterior infarct , age undetermined  Abnormal ECG  When compared with ECG of 19-NOV-2024 10:36,  Premature ventricular complexes are no longer Present  Premature atrial complexes are now Present  Vent. rate has increased BY  44 BPM  See ED provider note for full interpretation and clinical correlation  Confirmed by Carrie Graves ([id number]) on 5/3/2025 10:15:47 AM  ECG 12 lead  Normal sinus rhythm  Left axis deviation  Abnormal ECG  When compared with ECG of 02-MAY-2025 19:48, (unconfirmed)  Premature atrial complexes are no longer Present  QRS axis Shifted left  ST elevation now present in Lateral leads  T wave inversion  now evident in Inferior leads            [1] aspirin, 81 mg, oral, Daily  atorvastatin, 20 mg, oral, Nightly  cholecalciferol, 25 mcg, oral, Daily  clopidogrel, 75 mg, oral, Daily  cyanocobalamin, 1,000 mcg, oral, Daily  fluticasone furoate-vilanteroL, 1 puff, inhalation, Daily  levothyroxine, 75 mcg, oral, Daily  metoprolol succinate XL, 12.5 mg, oral, Daily  oxygen, , inhalation, Continuous - Inhalation  perflutren protein A microsphere, 0.5 mL, intravenous, Once in imaging  sulfur hexafluoride microsphr, 2 mL, intravenous, Once in imaging  tiotropium, 2 puff, inhalation, Daily  [2] PRN medications: acetaminophen **OR** acetaminophen **OR** acetaminophen, ipratropium-albuteroL, menthol-zinc oxide, nitroglycerin  [3]

## 2025-05-03 NOTE — CARE PLAN
The clinical goals for the shift include Patient will remain free of injury throughout entire shift.

## 2025-05-03 NOTE — ED PROCEDURE NOTE
Procedure  Critical Care    Performed by: Arsh BALBUENA MD  Authorized by: Arsh BALBUENA MD    Critical care provider statement:     Critical care time (minutes):  76    Critical care time was exclusive of:  Separately billable procedures and treating other patients    Critical care was necessary to treat or prevent imminent or life-threatening deterioration of the following conditions:  Cardiac failure (NSTEMI)    Critical care was time spent personally by me on the following activities:  Blood draw for specimens, development of treatment plan with patient or surrogate, discussions with primary provider, evaluation of patient's response to treatment, examination of patient, obtaining history from patient or surrogate, ordering and performing treatments and interventions, ordering and review of laboratory studies, ordering and review of radiographic studies, pulse oximetry and re-evaluation of patient's condition    Care discussed with: admitting provider                 Arsh BALBUENA MD  05/02/25 0800

## 2025-05-04 VITALS
SYSTOLIC BLOOD PRESSURE: 104 MMHG | HEIGHT: 66 IN | OXYGEN SATURATION: 98 % | RESPIRATION RATE: 36 BRPM | DIASTOLIC BLOOD PRESSURE: 62 MMHG | BODY MASS INDEX: 18.42 KG/M2 | TEMPERATURE: 98.6 F | WEIGHT: 114.64 LBS | HEART RATE: 104 BPM

## 2025-05-04 LAB
ALBUMIN SERPL BCP-MCNC: 3.4 G/DL (ref 3.4–5)
ANION GAP SERPL CALC-SCNC: 17 MMOL/L (ref 10–20)
AORTIC VALVE MEAN GRADIENT: 11 MMHG
AORTIC VALVE PEAK VELOCITY: 2.16 M/S
AV PEAK GRADIENT: 19 MMHG
AVA (PEAK VEL): 0.92 CM2
AVA (VTI): 0.92 CM2
BUN SERPL-MCNC: 47 MG/DL (ref 6–23)
CALCIUM SERPL-MCNC: 8.3 MG/DL (ref 8.6–10.3)
CHLORIDE SERPL-SCNC: 101 MMOL/L (ref 98–107)
CO2 SERPL-SCNC: 17 MMOL/L (ref 21–32)
CREAT SERPL-MCNC: 1.64 MG/DL (ref 0.5–1.05)
EGFRCR SERPLBLD CKD-EPI 2021: 29 ML/MIN/1.73M*2
EJECTION FRACTION APICAL 4 CHAMBER: 34.1
EJECTION FRACTION: 33 %
ERYTHROCYTE [DISTWIDTH] IN BLOOD BY AUTOMATED COUNT: 13.5 % (ref 11.5–14.5)
GLUCOSE BLD MANUAL STRIP-MCNC: 127 MG/DL (ref 74–99)
GLUCOSE SERPL-MCNC: 128 MG/DL (ref 74–99)
HCT VFR BLD AUTO: 27.6 % (ref 36–46)
HGB BLD-MCNC: 9.5 G/DL (ref 12–16)
LEFT ATRIUM VOLUME AREA LENGTH INDEX BSA: 52 ML/M2
LEFT VENTRICLE INTERNAL DIMENSION DIASTOLE: 4.34 CM (ref 3.5–6)
LEFT VENTRICULAR OUTFLOW TRACT DIAMETER: 1.7 CM
LV EJECTION FRACTION BIPLANE: 35 %
MAGNESIUM SERPL-MCNC: 2.33 MG/DL (ref 1.6–2.4)
MCH RBC QN AUTO: 29 PG (ref 26–34)
MCHC RBC AUTO-ENTMCNC: 34.4 G/DL (ref 32–36)
MCV RBC AUTO: 84 FL (ref 80–100)
MITRAL VALVE E/A RATIO: 1.94
NRBC BLD-RTO: 0 /100 WBCS (ref 0–0)
PHOSPHATE SERPL-MCNC: 4.2 MG/DL (ref 2.5–4.9)
PLATELET # BLD AUTO: 133 X10*3/UL (ref 150–450)
POTASSIUM SERPL-SCNC: 5.2 MMOL/L (ref 3.5–5.3)
RBC # BLD AUTO: 3.28 X10*6/UL (ref 4–5.2)
RIGHT VENTRICLE FREE WALL PEAK S': 11.2 CM/S
RIGHT VENTRICLE PEAK SYSTOLIC PRESSURE: 55.4 MMHG
SODIUM SERPL-SCNC: 130 MMOL/L (ref 136–145)
TRICUSPID ANNULAR PLANE SYSTOLIC EXCURSION: 1.6 CM
WBC # BLD AUTO: 14.4 X10*3/UL (ref 4.4–11.3)

## 2025-05-04 PROCEDURE — 2500000001 HC RX 250 WO HCPCS SELF ADMINISTERED DRUGS (ALT 637 FOR MEDICARE OP): Performed by: INTERNAL MEDICINE

## 2025-05-04 PROCEDURE — 2500000002 HC RX 250 W HCPCS SELF ADMINISTERED DRUGS (ALT 637 FOR MEDICARE OP, ALT 636 FOR OP/ED): Performed by: INTERNAL MEDICINE

## 2025-05-04 PROCEDURE — 1200000002 HC GENERAL ROOM WITH TELEMETRY DAILY

## 2025-05-04 PROCEDURE — 99233 SBSQ HOSP IP/OBS HIGH 50: CPT | Performed by: INTERNAL MEDICINE

## 2025-05-04 PROCEDURE — 84100 ASSAY OF PHOSPHORUS: CPT | Performed by: STUDENT IN AN ORGANIZED HEALTH CARE EDUCATION/TRAINING PROGRAM

## 2025-05-04 PROCEDURE — 2500000004 HC RX 250 GENERAL PHARMACY W/ HCPCS (ALT 636 FOR OP/ED): Performed by: NURSE PRACTITIONER

## 2025-05-04 PROCEDURE — 94640 AIRWAY INHALATION TREATMENT: CPT

## 2025-05-04 PROCEDURE — 85027 COMPLETE CBC AUTOMATED: CPT | Performed by: STUDENT IN AN ORGANIZED HEALTH CARE EDUCATION/TRAINING PROGRAM

## 2025-05-04 PROCEDURE — 2500000005 HC RX 250 GENERAL PHARMACY W/O HCPCS: Performed by: STUDENT IN AN ORGANIZED HEALTH CARE EDUCATION/TRAINING PROGRAM

## 2025-05-04 PROCEDURE — 2500000002 HC RX 250 W HCPCS SELF ADMINISTERED DRUGS (ALT 637 FOR MEDICARE OP, ALT 636 FOR OP/ED): Mod: JZ | Performed by: STUDENT IN AN ORGANIZED HEALTH CARE EDUCATION/TRAINING PROGRAM

## 2025-05-04 PROCEDURE — 36415 COLL VENOUS BLD VENIPUNCTURE: CPT | Performed by: STUDENT IN AN ORGANIZED HEALTH CARE EDUCATION/TRAINING PROGRAM

## 2025-05-04 PROCEDURE — 82947 ASSAY GLUCOSE BLOOD QUANT: CPT

## 2025-05-04 PROCEDURE — 2500000001 HC RX 250 WO HCPCS SELF ADMINISTERED DRUGS (ALT 637 FOR MEDICARE OP): Performed by: STUDENT IN AN ORGANIZED HEALTH CARE EDUCATION/TRAINING PROGRAM

## 2025-05-04 PROCEDURE — 83735 ASSAY OF MAGNESIUM: CPT | Performed by: STUDENT IN AN ORGANIZED HEALTH CARE EDUCATION/TRAINING PROGRAM

## 2025-05-04 PROCEDURE — 2500000004 HC RX 250 GENERAL PHARMACY W/ HCPCS (ALT 636 FOR OP/ED): Mod: JZ | Performed by: INTERNAL MEDICINE

## 2025-05-04 RX ORDER — SODIUM CHLORIDE 9 MG/ML
50 INJECTION, SOLUTION INTRAVENOUS CONTINUOUS
Status: ACTIVE | OUTPATIENT
Start: 2025-05-04 | End: 2025-05-05

## 2025-05-04 RX ORDER — LORAZEPAM 2 MG/ML
1 INJECTION INTRAMUSCULAR EVERY 4 HOURS PRN
Status: DISPENSED | OUTPATIENT
Start: 2025-05-04

## 2025-05-04 RX ORDER — MORPHINE SULFATE 2 MG/ML
1 INJECTION, SOLUTION INTRAMUSCULAR; INTRAVENOUS EVERY 4 HOURS PRN
Status: DISPENSED | OUTPATIENT
Start: 2025-05-04

## 2025-05-04 RX ORDER — LORAZEPAM 2 MG/ML
1 INJECTION INTRAMUSCULAR ONCE
Status: COMPLETED | OUTPATIENT
Start: 2025-05-04 | End: 2025-05-04

## 2025-05-04 RX ADMIN — IPRATROPIUM BROMIDE AND ALBUTEROL SULFATE 3 ML: .5; 3 SOLUTION RESPIRATORY (INHALATION) at 14:02

## 2025-05-04 RX ADMIN — Medication 2 L/MIN: at 07:30

## 2025-05-04 RX ADMIN — Medication 1000 MCG: at 09:21

## 2025-05-04 RX ADMIN — ACETAMINOPHEN 650 MG: 650 SUPPOSITORY RECTAL at 14:23

## 2025-05-04 RX ADMIN — Medication 2 L/MIN: at 20:00

## 2025-05-04 RX ADMIN — SODIUM CHLORIDE 500 ML: 9 INJECTION, SOLUTION INTRAVENOUS at 09:17

## 2025-05-04 RX ADMIN — ENOXAPARIN SODIUM 30 MG: 30 INJECTION SUBCUTANEOUS at 10:50

## 2025-05-04 RX ADMIN — LEVOTHYROXINE SODIUM 75 MCG: 75 TABLET ORAL at 05:48

## 2025-05-04 RX ADMIN — MORPHINE SULFATE 1 MG: 2 INJECTION, SOLUTION INTRAMUSCULAR; INTRAVENOUS at 13:40

## 2025-05-04 RX ADMIN — FUROSEMIDE 20 MG: 10 INJECTION, SOLUTION INTRAMUSCULAR; INTRAVENOUS at 00:24

## 2025-05-04 RX ADMIN — LORAZEPAM 1 MG: 2 INJECTION INTRAMUSCULAR; INTRAVENOUS at 10:51

## 2025-05-04 RX ADMIN — CLOPIDOGREL BISULFATE 75 MG: 75 TABLET, FILM COATED ORAL at 09:21

## 2025-05-04 RX ADMIN — MORPHINE SULFATE 1 MG: 2 INJECTION, SOLUTION INTRAMUSCULAR; INTRAVENOUS at 17:54

## 2025-05-04 RX ADMIN — Medication 2 L/MIN: at 14:02

## 2025-05-04 RX ADMIN — SODIUM CHLORIDE 50 ML/HR: 0.9 INJECTION, SOLUTION INTRAVENOUS at 13:34

## 2025-05-04 RX ADMIN — LORAZEPAM 1 MG: 2 INJECTION INTRAMUSCULAR; INTRAVENOUS at 14:25

## 2025-05-04 RX ADMIN — MORPHINE SULFATE 1 MG: 2 INJECTION, SOLUTION INTRAMUSCULAR; INTRAVENOUS at 23:35

## 2025-05-04 RX ADMIN — ASPIRIN 81 MG: 81 TABLET, CHEWABLE ORAL at 09:21

## 2025-05-04 RX ADMIN — Medication 25 MCG: at 09:21

## 2025-05-04 ASSESSMENT — COGNITIVE AND FUNCTIONAL STATUS - GENERAL
DRESSING REGULAR UPPER BODY CLOTHING: A LOT
CLIMB 3 TO 5 STEPS WITH RAILING: A LOT
WALKING IN HOSPITAL ROOM: A LOT
STANDING UP FROM CHAIR USING ARMS: A LOT
MOVING TO AND FROM BED TO CHAIR: A LOT
EATING MEALS: A LITTLE
EATING MEALS: TOTAL
MOVING FROM LYING ON BACK TO SITTING ON SIDE OF FLAT BED WITH BEDRAILS: A LITTLE
MOBILITY SCORE: 14
MOVING TO AND FROM BED TO CHAIR: A LOT
MOVING FROM LYING ON BACK TO SITTING ON SIDE OF FLAT BED WITH BEDRAILS: A LITTLE
WALKING IN HOSPITAL ROOM: A LOT
MOBILITY SCORE: 14
DRESSING REGULAR LOWER BODY CLOTHING: A LOT
DAILY ACTIVITIY SCORE: 9
TOILETING: TOTAL
STANDING UP FROM CHAIR USING ARMS: A LOT
PERSONAL GROOMING: A LITTLE
TURNING FROM BACK TO SIDE WHILE IN FLAT BAD: A LITTLE
DRESSING REGULAR UPPER BODY CLOTHING: A LOT
DRESSING REGULAR LOWER BODY CLOTHING: A LOT
CLIMB 3 TO 5 STEPS WITH RAILING: A LOT
HELP NEEDED FOR BATHING: A LOT
PERSONAL GROOMING: TOTAL
TURNING FROM BACK TO SIDE WHILE IN FLAT BAD: A LITTLE
TOILETING: A LOT
DAILY ACTIVITIY SCORE: 14
HELP NEEDED FOR BATHING: A LOT

## 2025-05-04 ASSESSMENT — PAIN SCALES - GENERAL: PAINLEVEL_OUTOF10: 0 - NO PAIN

## 2025-05-04 NOTE — PROGRESS NOTES
[facility] Progress Note             Date:  5/4/2025  Patient:  Gracie Carlson  YOB: 1928  MRN:  [id number]   Admit Date:  5/2/2025      SUBJECTIVE:    May 3, 2025 cardiology consult HPI:  Gracie Carlson is a 89 y.o. past medical history significant for coronary artery disease remote PCI/angioplasty in her 50s, aortic valve stenosis, TAVR November 25, 2015, mitral valve stenosis, mitral valve regurgitation, hypertension, hyperlipidemia, COPD, hypothyroid, anemia, pulmonary artery hypertension, rheumatoid arthritis who presents with chest pain and dyspnea.     Patient is resting comfortably in bed.  She has difficulty describing her symptoms.  She had some level of just chest discomfort and possible dyspnea.  She reports that she just did not feel good.  Denies palpitations, nausea, vomiting, dizziness, near-syncope, deshawn syncope.  She has occasional edema.     Past surgical history:  TAVR November 25, 2015  Knee surgery  Hysterectomy     Social history:  Former tobacco use  Denies alcohol use     Family history:  Mother history of heart disease  Mother history of hypertension     Review of systems have been reviewed and are negative, noncontributory, as previously mentioned x 12 systems.     I personally reviewed EKG and chest x-ray:  May 2 EKG: Sinus tachycardia 116 bpm, septal Q waves  May 2 chest x-ray: No acute cardiopulmonary disease     Labs: Troponin 5657  5801-3, 5726  BNP 1000  White blood cell 15.7  Hemoglobin 10.3  Creatinine    May 4, 2025 progress note:  Patient denies chest pain, dyspnea, palpitations, nausea, vomiting, dizziness, edema.  Resting comfortably in bed.    VITALS:     Vitals:    05/04/25 0401 05/04/25 0650 05/04/25 0725 05/04/25 0725   BP: 103/56  91/53 (!) 95/48   BP Location:   Left arm Right arm   Patient Position:   Lying Lying   Pulse: (!) 118  109    Resp: 24  18    Temp: 36.8 °C (98.2 °F)   36.9 °C (98.4 °F)   TempSrc: Temporal      SpO2: 94%  98%    Weight:  52  kg (114 lb 10.2 oz)     Height:         No intake or output data in the 24 hours ending 05/04/25 0931    Wt Readings from Last 4 Encounters:   05/04/25 52 kg (114 lb 10.2 oz)   11/19/24 49 kg (108 lb)   06/26/24 50 kg (110 lb 4.8 oz)   04/19/24 50 kg (110 lb 3.7 oz)       CURRENT HOSPITAL MEDICATIONS:   Scheduled Medications[1]  Continuous Medications[2]  Current Outpatient Medications   Medication Instructions    ascorbic acid (VITAMIN C) 500 mg, oral, Daily    atorvastatin (Lipitor) 20 mg tablet 1 tablet, Nightly    bisacodyl (Dulcolax) 5 mg EC tablet 1 tablet, oral, As needed    cholecalciferol (VITAMIN D3) 25 mcg, oral, Daily    cyanocobalamin (VITAMIN B-12) 1,000 mcg, oral, Daily    fluticasone propion-salmeteroL (Advair Diskus) 100-50 mcg/dose diskus inhaler 1 puff, 2 times daily    fluticasone/umeclidin/vilanter (TRELEGY ELLIPTA INHL) 1 puff, inhalation, Daily    levothyroxine (SYNTHROID, LEVOXYL) 75 mcg, oral, Daily, Take on an empty stomach at the same time each day, either 30 to 60 minutes prior to breakfast    losartan (COZAAR) 50 mg, oral, Daily    multivitamin tablet 1 tablet, Daily    nitroglycerin (NITROSTAT) 0.4 mg, sublingual, Every 5 min PRN    ondansetron (ZOFRAN) 4 mg, oral, Every 8 hours PRN    pantoprazole (PROTONIX) 40 mg, oral, Daily before breakfast, Do not crush, chew, or split.    polyethylene glycol (MIRALAX) 17 g, Daily PRN    tiotropium (Spiriva) 18 mcg inhalation capsule 1 capsule, inhalation, As needed        PHYSICAL EXAMINATION:   GENERAL:  Well developed, well nourished, in no acute distress.  CHEST:  Symmetric and nontender.  NEURO/PSYCH:  Alert and oriented with approppriate behavior and responses.  NECK:  Supple, no JVD, no bruit.  LUNGS:  Clear to auscultation bilaterally, normal respiratory effort.  HEART:  Rate and rhythm regular with no evident murmur, no gallop appreciated.        There are no rubs, clicks or heaves.  EXTREMITIES:  Warm with good color, no clubbing or  "cyanosis.  There is no edema noted.  PERIPHERAL VASCULAR:  Pulses present and equally palpable; 2+ throughout.      LAB DATA:     CBC:   Results from last 7 days   Lab Units 05/04/25  0530   WBC AUTO x10*3/uL 14.4*   RBC AUTO x10*6/uL 3.28*   HEMOGLOBIN g/dL 9.5*   HEMATOCRIT % 27.6*   PLATELETS AUTO x10*3/uL 133*        CMP:    Results from last 7 days   Lab Units 05/04/25  0530   SODIUM mmol/L 130*   POTASSIUM mmol/L 5.2   CHLORIDE mmol/L 101   CO2 mmol/L 17*   BUN mg/dL 47*   CREATININE mg/dL 1.64*   GLUCOSE mg/dL 128*   CALCIUM mg/dL 8.3*       Cardiac Enzymes:    Lab Results   Component Value Date    TROPHS 5,726 () 05/02/2025    TROPHS 5,893 () 05/02/2025    TROPHS 5,657 () 05/02/2025       Magnesium:    Lab Results   Component Value Date    MG 2.33 05/04/2025       Lipid Profile:    Lab Results   Component Value Date    TRIG 78 05/02/2025    HDL 63.7 05/02/2025    LDLCALC 59 05/02/2025       TSH:  No results found for: \"TSH\"    BNP:   Lab Results   Component Value Date     (H) 05/02/2025        PT/INR:    Lab Results   Component Value Date    PROTIME 11.9 05/02/2025    INR 1.1 05/02/2025       HgBA1c:  No results found for: \"HGBA1C\"    CBC:  Lab Results   Component Value Date    WBC 14.4 (H) 05/04/2025    WBC 15.7 (H) 05/03/2025    WBC 14.3 (H) 05/02/2025    RBC 3.28 (L) 05/04/2025    RBC 3.48 (L) 05/03/2025    RBC 3.68 (L) 05/02/2025    HGB 9.5 (L) 05/04/2025    HGB 10.3 (L) 05/03/2025    HGB 10.7 (L) 05/02/2025    HCT 27.6 (L) 05/04/2025    HCT 29.9 (L) 05/03/2025    HCT 31.9 (L) 05/02/2025    MCV 84 05/04/2025    MCV 86 05/03/2025    MCV 87 05/02/2025    MCH 29.0 05/04/2025    MCH 29.6 05/03/2025    MCH 29.1 05/02/2025    MCHC 34.4 05/04/2025    MCHC 34.4 05/03/2025    MCHC 33.5 05/02/2025    RDW 13.5 05/04/2025    RDW 13.4 05/03/2025    RDW 13.2 05/02/2025     (L) 05/04/2025     (L) 05/03/2025     (L) 05/02/2025       BMP:  Lab Results   Component Value Date     (L) " 05/04/2025     (L) 05/03/2025     (L) 05/02/2025    K 5.2 05/04/2025    K 4.4 05/03/2025    K 4.1 05/02/2025     05/04/2025     05/03/2025     05/02/2025    CO2 17 (L) 05/04/2025    CO2 19 (L) 05/03/2025    CO2 21 05/02/2025    BUN 47 (H) 05/04/2025    BUN 30 (H) 05/03/2025    BUN 28 (H) 05/02/2025    CREATININE 1.64 (H) 05/04/2025    CREATININE 1.01 05/03/2025    CREATININE 1.00 05/02/2025       Hepatic Function Panel:    Lab Results   Component Value Date    ALKPHOS 57 05/02/2025    ALT 20 05/02/2025    AST 31 05/02/2025    PROT 6.1 (L) 05/02/2025    BILITOT 0.9 05/02/2025       DIAGNOSTIC TESTING:     ECG 12 lead  Result Date: 5/3/2025  Sinus tachycardia with Premature atrial complexes Septal infarct (cited on or before 02-MAY-2025) Abnormal ECG When compared with ECG of 02-MAY-2025 18:52, (unconfirmed) QRS axis Shifted right See ED provider note for full interpretation and clinical correlation Confirmed by Carrie Graves ([id number]) on 5/3/2025 5:48:15 PM    ECG 12 lead  Result Date: 5/3/2025  Normal sinus rhythm Left axis deviation Nonspecific ST and T wave abnormality Abnormal ECG When compared with ECG of 02-MAY-2025 19:48, (unconfirmed) Premature atrial complexes are no longer Present QRS axis Shifted left ST elevation now present in Lateral leads T wave inversion now evident in Inferior leads Confirmed by Manish Emanuel ([id number]) on 5/3/2025 4:07:39 PM    ECG 12 lead  Result Date: 5/3/2025  Sinus tachycardia with Premature atrial complexes Left axis deviation Anterior infarct , age undetermined Abnormal ECG When compared with ECG of 19-NOV-2024 10:36, Premature ventricular complexes are no longer Present Premature atrial complexes are now Present Vent. rate has increased BY  44 BPM See ED provider note for full interpretation and clinical correlation Confirmed by Carrie Graves ([id number]) on 5/3/2025 10:15:47 AM    CT angio chest for pulmonary embolism  Result Date: 5/2/2025  STUDY: CT  Angiogram of the Chest; 05/02/2025 08:04PM INDICATION: Short of breath, tachycardia, elevated top. COMPARISON: CT CAP 04/16/2024; XR Chest 05/02/2025, 11/19/2024. ACCESSION NUMBER(S): [id number] ORDERING CLINICIAN: LOKESH GILBERT TECHNIQUE:  CTA of the chest was performed with intravenous contrast. Images are reviewed and processed at a workstation according to the CT angiogram protocol with 3-D and/or MIP post processing imaging generated.  Omnipaque 350-75 mL was administered intravenously.  1090 DICOM images received. Automated mA/kV exposure control was utilized and patient examination was performed in strict accordance with principles of ALARA. FINDINGS: Pulmonary arteries are adequately opacified without acute or chronic filling defects.  The thoracic aorta is normal in course and caliber without dissection or aneurysm. The heart is enlarged without pericardial effusion.  There are postprocedural changes from transcapillary aortic valve replacement. Thoracic lymph nodes are not enlarged. There is a mild right and trace left pleural effusion.  Mild pulmonary edema.  There is calcification of the mitral annulus.  No pneumothorax.  The airways are patent. No suspicious nodules. Upper abdomen demonstrates no acute pathology.  There is significant atherosclerotic disease of the abdominal aorta There are no acute fractures.  No suspicious bony lesions.  Mild convexity of the thoracic spine to the right of the midline.    1. No acute or chronic pulmonary emboli. 2. No thoracic aortic aneurysm or dissection. 3. Cardiomegaly with mild pulmonary edema. 4. Mild right and trace left pleural effusion. 5. Postprocedural changes from transcapillary aortic valve replacement. Signed by Loyd Plascencia MD    XR chest 1 view  Result Date: 5/2/2025  STUDY: Chest Radiograph;  5/2/2025 6:37 PM INDICATION: Chest pain. COMPARISON: 11/19/2024 XR Chest. ACCESSION NUMBER(S): [id number] ORDERING CLINICIAN: LOKESH GILBERT TECHNIQUE:   Frontal chest was obtained at 18:36 hours. FINDINGS: CARDIOMEDIASTINAL SILHOUETTE: Cardiomediastinal silhouette is normal in size and configuration.  LUNGS: Lungs are clear.  ABDOMEN: No remarkable upper abdominal findings.  BONES: No acute osseous changes.    No acute pulmonary abnormality. Signed by Jose Adamson MD       RADIOLOGY:     Transthoracic Echo Complete         CT angio chest for pulmonary embolism   Final Result   1. No acute or chronic pulmonary emboli.   2. No thoracic aortic aneurysm or dissection.   3. Cardiomegaly with mild pulmonary edema.   4. Mild right and trace left pleural effusion.   5. Postprocedural changes from transcapillary aortic valve   replacement.   Signed by Loyd Plascencia MD      XR chest 1 view   Final Result   No acute pulmonary abnormality.   Signed by Jose Adamson MD          PROBLEM LIST   Problem List[3]    ASSESSMENT:     NSTEMI  Hypotension  Acute renal failure  Ischemic cardiomyopathy with severe depressed systolic function   Acute systolic congestive heart failure  Aortic valve stenosis/TAVR November 25, 2015  Coronary artery disease/PCI/angioplasty mid 50s  Mitral valve stenosis/mitral valve regurgitation  Hypertension  Hyperlipidemia  COPD  Hypothyroidism  Rheumatoid arthritis  Pulmonary hypertension      PLAN:     Hold off beta-blocker and diuretic due to hypotension and acute renal failure.  Advise IV fluids    I had a detailed discussion with patient's son Andrea, and daughter-in-law about her cardiac condition and treatment options.  Patient apparently is a DNR documented at home.  They prefer conservative management.  Do not want invasive procedures such as a cardiac catheterization.  At this time advise medical therapy:  aspirin/Plavix/statin    Advise palliative care consultation       Gradually adjust medical therapy as hemodynamics allow      Please do not hesitate to call with questions.  Electronically signed by Manish Emanuel DO, on 5/4/2025 at 9:31  AM       [1] aspirin, 81 mg, oral, Daily  atorvastatin, 20 mg, oral, Nightly  cholecalciferol, 25 mcg, oral, Daily  clopidogrel, 75 mg, oral, Daily  cyanocobalamin, 1,000 mcg, oral, Daily  enoxaparin, 30 mg, subcutaneous, q24h  fluticasone furoate-vilanteroL, 1 puff, inhalation, Daily  levothyroxine, 75 mcg, oral, Daily  [Held by provider] metoprolol succinate XL, 12.5 mg, oral, Daily  oxygen, , inhalation, Continuous - Inhalation  sodium chloride, 500 mL, intravenous, Once  tiotropium, 2 puff, inhalation, Daily  [2]    [3]   Patient Active Problem List  Diagnosis    Asthma    Balance problem    Benign essential HTN    CAD S/P percutaneous coronary angioplasty    Chronic diastolic congestive heart failure    Chronic obstructive pulmonary disease (Multi)    Dysphonia    Eye problem    Generalized arthritis    Hypothyroidism    Mitral valve stenosis    Mixed hyperlipidemia    Periprosth fracture around unsp internal prosth joint, subs    Localized, primary osteoarthritis of shoulder region    Rotator cuff tear arthropathy of right shoulder    Primary osteoarthritis of right shoulder    Pulmonary hypertension (Multi)    S/P TAVR (transcatheter aortic valve replacement)    Weakness of both hips    Former smoker    Fall, initial encounter    [place]-prosthetic fracture around prosthetic hip    Impaired mobility and ADLs    Pain    Slow transit constipation    Bright red blood per rectum    BMI 21.0-21.9, adult    NSTEMI (non-ST elevated myocardial infarction) (Multi)

## 2025-05-04 NOTE — CARE PLAN
Problem: Skin  Goal: Decreased wound size/increased tissue granulation at next dressing change  Outcome: Progressing  Goal: Participates in plan/prevention/treatment measures  Outcome: Progressing  Goal: Prevent/manage excess moisture  Outcome: Progressing  Goal: Prevent/minimize sheer/friction injuries  Outcome: Progressing  Goal: Promote/optimize nutrition  Outcome: Progressing  Goal: Promote skin healing  Outcome: Progressing     Problem: Pain - Adult  Goal: Verbalizes/displays adequate comfort level or baseline comfort level  Outcome: Progressing     Problem: Safety - Adult  Goal: Free from fall injury  Outcome: Progressing     Problem: Discharge Planning  Goal: Discharge to home or other facility with appropriate resources  Outcome: Progressing     Problem: Chronic Conditions and Co-morbidities  Goal: Patient's chronic conditions and co-morbidity symptoms are monitored and maintained or improved  Outcome: Progressing     Problem: Nutrition  Goal: Nutrient intake appropriate for maintaining nutritional needs  Outcome: Progressing     Problem: Fall/Injury  Goal: Not fall by end of shift  Outcome: Progressing  Goal: Be free from injury by end of the shift  Outcome: Progressing  Goal: Verbalize understanding of personal risk factors for fall in the hospital  Outcome: Progressing  Goal: Verbalize understanding of risk factor reduction measures to prevent injury from fall in the home  Outcome: Progressing  Goal: Use assistive devices by end of the shift  Outcome: Progressing  Goal: Pace activities to prevent fatigue by end of the shift  Outcome: Progressing   The patient's goals for the shift include      The clinical goals for the shift include Patient will remain free of injury throughout entire shift.

## 2025-05-04 NOTE — PROGRESS NOTES
ASSESSMENT & PLAN:     89F with PM of HTN, DLD, CAD s/p remote PCI, aortic stenosis s/p TAVR, mitral stenosis, mitral regurgitation, COPD, hypothyroidism, chronic Hermila, pHTN, RA who presented with CP. Found to have NSTEMI.     NSTEMI  ICM  Acute HFrEF  Acute hypoxemic respiratory failure likely 2/2 above  Hypotension  FRANCES  HAGMA  Hyponatremia  Acute agitated delirium  CAD s/p remote PCI  Mitral valve stenosis and regurgitation  Hx of HTN  DLD  COPD  Hypothyroidism  RA  pHTN  Chronic anemia  Frequent falls  Thrombocytopenia    -cardiology following  -TTE done, official read pending, but per cardiology, has newly worsened EF to 35%, and progression of her valvular heart disease with severe MR  -poa wants conservative mgmt, and would not want any invasive eval such as lhc. Managing medically.   -ASA 81, start Plavix, statin  -did not tolerate low dose BB even yesterday, became hypotensive. Will hold.   -hold off on further diuresis, will actually give some IVF back today. Developed FRANCES.   -prn IV morphine for air hunger, PRN IV ativan for agitation  -tele  -wean o2 as tolerated  -PT/OT eval pending, family would like snf placement  -will have further GOC discussions today as pt not doing well, complications of hypotension, FRANCES, unable to tolerate medications, agitated delirium. Hospice would be reasonable option here.     Vte ppx lovenox  DNR/DNI, no ICU  Dispo tbd    Samir Gama MD    SUBJECTIVE     Yesterday later in evening, became more hypotensive, tachycardic. BB was held, further diuresis was held. Was agitated, had air hunger. Received IV morphine, IV ativan. Further agitation overnight, attempting to pull IV, O2 off. This morning resting.      OBJECTIVE:       Last Recorded Vitals:  Vitals:    05/04/25 0401 05/04/25 0650 05/04/25 0725 05/04/25 0725   BP: 103/56  91/53 (!) 95/48   BP Location:   Left arm Right arm   Patient Position:   Lying Lying   Pulse: (!) 118  109    Resp: 24  18    Temp: 36.8 °C (98.2  °F)   36.9 °C (98.4 °F)   TempSrc: Temporal      SpO2: 94%  98%    Weight:  52 kg (114 lb 10.2 oz)     Height:           Last I/O:  No intake/output data recorded.    Physical Exam:  GEN: frail appearing, appears stated age, NAD  CV: RRR, no m/r/g, no LE edema  LUNGS: reduced breath sounds, faint crackles  ABD: soft, NT  NEURO: A+Ox3, no FND    Inpatient Medications:  Scheduled Medications[1]    PRN Medications  PRN Medications[2]    Continuous Medications:  Continuous Medications[3]      LABS AND IMAGING:     Labs:  Results for orders placed or performed during the hospital encounter of 05/02/25 (from the past 24 hours)   Transthoracic Echo Complete   Result Value Ref Range    BSA 1.53 m2   ECG 12 lead   Result Value Ref Range    Ventricular Rate 91 BPM    Atrial Rate 91 BPM    NC Interval 152 ms    QRS Duration 72 ms    QT Interval 356 ms    QTC Calculation(Bazett) 437 ms    P Axis 87 degrees    R Axis -31 degrees    T Axis -8 degrees    QRS Count 15 beats    Q Onset 224 ms    P Onset 148 ms    P Offset 213 ms    T Offset 402 ms    QTC Fredericia 409 ms   POCT GLUCOSE   Result Value Ref Range    POCT Glucose 181 (H) 74 - 99 mg/dL   CBC   Result Value Ref Range    WBC 14.4 (H) 4.4 - 11.3 x10*3/uL    nRBC 0.0 0.0 - 0.0 /100 WBCs    RBC 3.28 (L) 4.00 - 5.20 x10*6/uL    Hemoglobin 9.5 (L) 12.0 - 16.0 g/dL    Hematocrit 27.6 (L) 36.0 - 46.0 %    MCV 84 80 - 100 fL    MCH 29.0 26.0 - 34.0 pg    MCHC 34.4 32.0 - 36.0 g/dL    RDW 13.5 11.5 - 14.5 %    Platelets 133 (L) 150 - 450 x10*3/uL   Renal Function Panel   Result Value Ref Range    Glucose 128 (H) 74 - 99 mg/dL    Sodium 130 (L) 136 - 145 mmol/L    Potassium 5.2 3.5 - 5.3 mmol/L    Chloride 101 98 - 107 mmol/L    Bicarbonate 17 (L) 21 - 32 mmol/L    Anion Gap 17 10 - 20 mmol/L    Urea Nitrogen 47 (H) 6 - 23 mg/dL    Creatinine 1.64 (H) 0.50 - 1.05 mg/dL    eGFR 29 (L) >60 mL/min/1.73m*2    Calcium 8.3 (L) 8.6 - 10.3 mg/dL    Phosphorus 4.2 2.5 - 4.9 mg/dL    Albumin  3.4 3.4 - 5.0 g/dL   Magnesium   Result Value Ref Range    Magnesium 2.33 1.60 - 2.40 mg/dL   POCT GLUCOSE   Result Value Ref Range    POCT Glucose 127 (H) 74 - 99 mg/dL        Imaging:  ECG 12 lead  Sinus tachycardia with Premature atrial complexes  Septal infarct (cited on or before 02-MAY-2025)  Abnormal ECG  When compared with ECG of 02-MAY-2025 18:52, (unconfirmed)  QRS axis Shifted right  See ED provider note for full interpretation and clinical correlation  Confirmed by Carrie Graves ([id number]) on 5/3/2025 5:48:15 PM  ECG 12 lead  Normal sinus rhythm  Left axis deviation  Nonspecific ST and T wave abnormality  Abnormal ECG  When compared with ECG of 02-MAY-2025 19:48, (unconfirmed)  Premature atrial complexes are no longer Present  QRS axis Shifted left  ST elevation now present in Lateral leads  T wave inversion now evident in Inferior leads  Confirmed by Manish Emanuel ([id number]) on 5/3/2025 4:07:39 PM  ECG 12 lead  Sinus tachycardia with Premature atrial complexes  Left axis deviation  Anterior infarct , age undetermined  Abnormal ECG  When compared with ECG of 19-NOV-2024 10:36,  Premature ventricular complexes are no longer Present  Premature atrial complexes are now Present  Vent. rate has increased BY  44 BPM  See ED provider note for full interpretation and clinical correlation  Confirmed by Carrie Graves ([id number]) on 5/3/2025 10:15:47 AM            [1] aspirin, 81 mg, oral, Daily  atorvastatin, 20 mg, oral, Nightly  cholecalciferol, 25 mcg, oral, Daily  clopidogrel, 75 mg, oral, Daily  cyanocobalamin, 1,000 mcg, oral, Daily  enoxaparin, 30 mg, subcutaneous, q24h  fluticasone furoate-vilanteroL, 1 puff, inhalation, Daily  levothyroxine, 75 mcg, oral, Daily  [Held by provider] metoprolol succinate XL, 12.5 mg, oral, Daily  oxygen, , inhalation, Continuous - Inhalation  sodium chloride, 500 mL, intravenous, Once  tiotropium, 2 puff, inhalation, Daily     [2] PRN medications: acetaminophen **OR** acetaminophen  **OR** acetaminophen, ipratropium-albuteroL, menthol-zinc oxide, nitroglycerin, OLANZapine  [3]

## 2025-05-04 NOTE — PROGRESS NOTES
Social Work Note Rcvd order for hospice consult.  Met with fidencio Hammond and YAMILET Marcos.  Explained hospice services including insurance coverage for hospice and provider options.  Offered freedom of choice.  Family requests [facility].  Sent both referrals in [facility].  Later rcvd msg from [facility] stating that they are scheduled to meet with pt and family 5/5 @ 9:30AM.  XI Parsons

## 2025-05-04 NOTE — CARE PLAN
The patient's goals for the shift include Rest    The clinical goals for the shift include Patient will remain free of injury throughout shift

## 2025-05-05 VITALS
RESPIRATION RATE: 23 BRPM | SYSTOLIC BLOOD PRESSURE: 85 MMHG | HEIGHT: 66 IN | OXYGEN SATURATION: 98 % | WEIGHT: 119.71 LBS | DIASTOLIC BLOOD PRESSURE: 56 MMHG | BODY MASS INDEX: 19.24 KG/M2 | HEART RATE: 106 BPM | TEMPERATURE: 97 F

## 2025-05-05 LAB — HOLD SPECIMEN: 293

## 2025-05-05 PROCEDURE — 99239 HOSP IP/OBS DSCHRG MGMT >30: CPT | Performed by: INTERNAL MEDICINE

## 2025-05-05 PROCEDURE — 99232 SBSQ HOSP IP/OBS MODERATE 35: CPT | Performed by: INTERNAL MEDICINE

## 2025-05-05 PROCEDURE — 2500000004 HC RX 250 GENERAL PHARMACY W/ HCPCS (ALT 636 FOR OP/ED): Mod: JZ | Performed by: INTERNAL MEDICINE

## 2025-05-05 PROCEDURE — 2500000005 HC RX 250 GENERAL PHARMACY W/O HCPCS: Performed by: INTERNAL MEDICINE

## 2025-05-05 RX ORDER — LORAZEPAM 2 MG/ML
1 INJECTION INTRAMUSCULAR EVERY 2 HOUR PRN
Status: DISCONTINUED | OUTPATIENT
Start: 2025-05-05 | End: 2025-05-05 | Stop reason: HOSPADM

## 2025-05-05 RX ORDER — ALBUTEROL SULFATE 0.83 MG/ML
2.5 SOLUTION RESPIRATORY (INHALATION) EVERY 4 HOURS PRN
Status: CANCELLED | OUTPATIENT
Start: 2025-05-05

## 2025-05-05 RX ORDER — MORPHINE SULFATE 2 MG/ML
2 INJECTION, SOLUTION INTRAMUSCULAR; INTRAVENOUS
Status: CANCELLED | OUTPATIENT
Start: 2025-05-05

## 2025-05-05 RX ORDER — ALBUTEROL SULFATE 0.83 MG/ML
2.5 SOLUTION RESPIRATORY (INHALATION) EVERY 4 HOURS PRN
Status: DISCONTINUED | OUTPATIENT
Start: 2025-05-05 | End: 2025-05-05 | Stop reason: HOSPADM

## 2025-05-05 RX ORDER — MORPHINE SULFATE 2 MG/ML
2 INJECTION, SOLUTION INTRAMUSCULAR; INTRAVENOUS
Status: DISCONTINUED | OUTPATIENT
Start: 2025-05-05 | End: 2025-05-05 | Stop reason: HOSPADM

## 2025-05-05 RX ORDER — IPRATROPIUM BROMIDE AND ALBUTEROL SULFATE 2.5; .5 MG/3ML; MG/3ML
3 SOLUTION RESPIRATORY (INHALATION) EVERY 4 HOURS PRN
Status: CANCELLED | OUTPATIENT
Start: 2025-05-05

## 2025-05-05 RX ORDER — LORAZEPAM 2 MG/ML
1 INJECTION INTRAMUSCULAR EVERY 2 HOUR PRN
Status: CANCELLED | OUTPATIENT
Start: 2025-05-05

## 2025-05-05 RX ORDER — GLYCOPYRROLATE 0.2 MG/ML
0.2 INJECTION INTRAMUSCULAR; INTRAVENOUS EVERY 4 HOURS PRN
Status: DISCONTINUED | OUTPATIENT
Start: 2025-05-05 | End: 2025-05-05 | Stop reason: HOSPADM

## 2025-05-05 RX ORDER — GLYCOPYRROLATE 0.2 MG/ML
0.2 INJECTION INTRAMUSCULAR; INTRAVENOUS EVERY 4 HOURS PRN
Status: CANCELLED | OUTPATIENT
Start: 2025-05-05

## 2025-05-05 RX ADMIN — Medication 2 L/MIN: at 09:02

## 2025-05-05 RX ADMIN — MORPHINE SULFATE 2 MG: 2 INJECTION, SOLUTION INTRAMUSCULAR; INTRAVENOUS at 10:05

## 2025-05-05 ASSESSMENT — PAIN - FUNCTIONAL ASSESSMENT: PAIN_FUNCTIONAL_ASSESSMENT: WONG-BAKER FACES

## 2025-05-05 ASSESSMENT — COGNITIVE AND FUNCTIONAL STATUS - GENERAL
DRESSING REGULAR LOWER BODY CLOTHING: A LOT
TURNING FROM BACK TO SIDE WHILE IN FLAT BAD: A LITTLE
STANDING UP FROM CHAIR USING ARMS: A LOT
MOVING FROM LYING ON BACK TO SITTING ON SIDE OF FLAT BED WITH BEDRAILS: A LITTLE
WALKING IN HOSPITAL ROOM: A LOT
PERSONAL GROOMING: A LITTLE
EATING MEALS: A LITTLE
MOVING TO AND FROM BED TO CHAIR: A LOT
DRESSING REGULAR UPPER BODY CLOTHING: A LOT
DAILY ACTIVITIY SCORE: 14
TOILETING: A LOT
HELP NEEDED FOR BATHING: A LOT
CLIMB 3 TO 5 STEPS WITH RAILING: A LOT
MOBILITY SCORE: 14

## 2025-05-05 ASSESSMENT — PAIN SCALES - WONG BAKER: WONGBAKER_NUMERICALRESPONSE: NO HURT

## 2025-05-05 ASSESSMENT — PAIN SCALES - GENERAL: PAINLEVEL_OUTOF10: 0 - NO PAIN

## 2025-05-05 NOTE — PROGRESS NOTES
[facility] Progress Note             Date:  5/5/2025  Patient:  Gracie Carlson  YOB: 1928  MRN:  [id number]   Admit Date:  5/2/2025      SUBJECTIVE:    May 3, 2025 cardiology consult HPI:  Gracie Carlson is a 89 y.o. past medical history significant for coronary artery disease remote PCI/angioplasty in her 50s, aortic valve stenosis, TAVR November 25, 2015, mitral valve stenosis, mitral valve regurgitation, hypertension, hyperlipidemia, COPD, hypothyroid, anemia, pulmonary artery hypertension, rheumatoid arthritis who presents with chest pain and dyspnea.     Patient is resting comfortably in bed.  She has difficulty describing her symptoms.  She had some level of just chest discomfort and possible dyspnea.  She reports that she just did not feel good.  Denies palpitations, nausea, vomiting, dizziness, near-syncope, deshawn syncope.  She has occasional edema.     Past surgical history:  TAVR November 25, 2015  Knee surgery  Hysterectomy     Social history:  Former tobacco use  Denies alcohol use     Family history:  Mother history of heart disease  Mother history of hypertension     Review of systems have been reviewed and are negative, noncontributory, as previously mentioned x 12 systems.     I personally reviewed EKG and chest x-ray:  May 2 EKG: Sinus tachycardia 116 bpm, septal Q waves  May 2 chest x-ray: No acute cardiopulmonary disease     Labs: Troponin 5657  5801-3, 5726  BNP 1000  White blood cell 15.7  Hemoglobin 10.3  Creatinine    May 4, 2025 progress note:  Patient denies chest pain, dyspnea, palpitations, nausea, vomiting, dizziness, edema.  Resting comfortably in bed.    May 5, 2025 progress note:  Patient unresponsive.  Son and daughter-in-law at bedside.  Hospice consultation this morning.    VITALS:     Vitals:    05/04/25 2324 05/05/25 0420 05/05/25 0700 05/05/25 0714   BP: 104/62 103/56  85/56   BP Location:  Left arm  Left arm   Patient Position:  Lying  Lying   Pulse: 104 94   106   Resp: (!) 36 22  23   Temp: 37 °C (98.6 °F) 36.6 °C (97.9 °F)  36.1 °C (97 °F)   TempSrc: Temporal Temporal  Temporal   SpO2: 98% 99%  98%   Weight:   54.3 kg (119 lb 11.4 oz)    Height:           Intake/Output Summary (Last 24 hours) at 5/5/2025 1019  Last data filed at 5/4/2025 1029  Gross per 24 hour   Intake 150 ml   Output --   Net 150 ml       Wt Readings from Last 4 Encounters:   05/05/25 54.3 kg (119 lb 11.4 oz)   11/19/24 49 kg (108 lb)   06/26/24 50 kg (110 lb 4.8 oz)   04/19/24 50 kg (110 lb 3.7 oz)       CURRENT HOSPITAL MEDICATIONS:   Scheduled Medications[1]  Continuous Medications[2]  Current Outpatient Medications   Medication Instructions    ascorbic acid (VITAMIN C) 500 mg, oral, Daily    atorvastatin (Lipitor) 20 mg tablet 1 tablet, Nightly    bisacodyl (Dulcolax) 5 mg EC tablet 1 tablet, oral, As needed    cholecalciferol (VITAMIN D3) 25 mcg, oral, Daily    cyanocobalamin (VITAMIN B-12) 1,000 mcg, oral, Daily    fluticasone propion-salmeteroL (Advair Diskus) 100-50 mcg/dose diskus inhaler 1 puff, 2 times daily    fluticasone/umeclidin/vilanter (TRELEGY ELLIPTA INHL) 1 puff, inhalation, Daily    levothyroxine (SYNTHROID, LEVOXYL) 75 mcg, oral, Daily, Take on an empty stomach at the same time each day, either 30 to 60 minutes prior to breakfast    losartan (COZAAR) 50 mg, oral, Daily    multivitamin tablet 1 tablet, Daily    nitroglycerin (NITROSTAT) 0.4 mg, sublingual, Every 5 min PRN    ondansetron (ZOFRAN) 4 mg, oral, Every 8 hours PRN    pantoprazole (PROTONIX) 40 mg, oral, Daily before breakfast, Do not crush, chew, or split.    polyethylene glycol (MIRALAX) 17 g, Daily PRN    tiotropium (Spiriva) 18 mcg inhalation capsule 1 capsule, inhalation, As needed        PHYSICAL EXAMINATION:   GENERAL:   no acute distress.  CHEST:  Symmetric and nontender.  NECK:  Supple, no JVD, no bruit.  LUNGS: Decreased breath sounds bilateral  HEART:  Rate and rhythm regular with no evident murmur, no  "gallop appreciated.        There are no rubs, clicks or heaves.  EXTREMITIES:  Warm with good color, no clubbing or cyanosis.  There is no edema noted.        LAB DATA:     CBC:   Results from last 7 days   Lab Units 05/04/25  0530   WBC AUTO x10*3/uL 14.4*   RBC AUTO x10*6/uL 3.28*   HEMOGLOBIN g/dL 9.5*   HEMATOCRIT % 27.6*   PLATELETS AUTO x10*3/uL 133*        CMP:    Results from last 7 days   Lab Units 05/04/25  0530   SODIUM mmol/L 130*   POTASSIUM mmol/L 5.2   CHLORIDE mmol/L 101   CO2 mmol/L 17*   BUN mg/dL 47*   CREATININE mg/dL 1.64*   GLUCOSE mg/dL 128*   CALCIUM mg/dL 8.3*       Cardiac Enzymes:    Lab Results   Component Value Date    TROPHS 5,726 () 05/02/2025    TROPHS 5,893 () 05/02/2025    TROPHS 5,657 () 05/02/2025       Magnesium:    Lab Results   Component Value Date    MG 2.33 05/04/2025       Lipid Profile:    Lab Results   Component Value Date    TRIG 78 05/02/2025    HDL 63.7 05/02/2025    LDLCALC 59 05/02/2025       TSH:  No results found for: \"TSH\"    BNP:   Lab Results   Component Value Date     (H) 05/02/2025        PT/INR:    Lab Results   Component Value Date    PROTIME 11.9 05/02/2025    INR 1.1 05/02/2025       HgBA1c:  No results found for: \"HGBA1C\"    CBC:  Lab Results   Component Value Date    WBC 14.4 (H) 05/04/2025    WBC 15.7 (H) 05/03/2025    WBC 14.3 (H) 05/02/2025    RBC 3.28 (L) 05/04/2025    RBC 3.48 (L) 05/03/2025    RBC 3.68 (L) 05/02/2025    HGB 9.5 (L) 05/04/2025    HGB 10.3 (L) 05/03/2025    HGB 10.7 (L) 05/02/2025    HCT 27.6 (L) 05/04/2025    HCT 29.9 (L) 05/03/2025    HCT 31.9 (L) 05/02/2025    MCV 84 05/04/2025    MCV 86 05/03/2025    MCV 87 05/02/2025    MCH 29.0 05/04/2025    MCH 29.6 05/03/2025    MCH 29.1 05/02/2025    MCHC 34.4 05/04/2025    MCHC 34.4 05/03/2025    MCHC 33.5 05/02/2025    RDW 13.5 05/04/2025    RDW 13.4 05/03/2025    RDW 13.2 05/02/2025     (L) 05/04/2025     (L) 05/03/2025     (L) 05/02/2025       BMP:  Lab " Results   Component Value Date     (L) 05/04/2025     (L) 05/03/2025     (L) 05/02/2025    K 5.2 05/04/2025    K 4.4 05/03/2025    K 4.1 05/02/2025     05/04/2025     05/03/2025     05/02/2025    CO2 17 (L) 05/04/2025    CO2 19 (L) 05/03/2025    CO2 21 05/02/2025    BUN 47 (H) 05/04/2025    BUN 30 (H) 05/03/2025    BUN 28 (H) 05/02/2025    CREATININE 1.64 (H) 05/04/2025    CREATININE 1.01 05/03/2025    CREATININE 1.00 05/02/2025       Hepatic Function Panel:    Lab Results   Component Value Date    ALKPHOS 57 05/02/2025    ALT 20 05/02/2025    AST 31 05/02/2025    PROT 6.1 (L) 05/02/2025    BILITOT 0.9 05/02/2025       DIAGNOSTIC TESTING:     ECG 12 lead  Result Date: 5/3/2025  Sinus tachycardia with Premature atrial complexes Septal infarct (cited on or before 02-MAY-2025) Abnormal ECG When compared with ECG of 02-MAY-2025 18:52, (unconfirmed) QRS axis Shifted right See ED provider note for full interpretation and clinical correlation Confirmed by Carrie Graves ([id number]) on 5/3/2025 5:48:15 PM    ECG 12 lead  Result Date: 5/3/2025  Normal sinus rhythm Left axis deviation Nonspecific ST and T wave abnormality Abnormal ECG When compared with ECG of 02-MAY-2025 19:48, (unconfirmed) Premature atrial complexes are no longer Present QRS axis Shifted left ST elevation now present in Lateral leads T wave inversion now evident in Inferior leads Confirmed by Manish Emanuel ([id number]) on 5/3/2025 4:07:39 PM    ECG 12 lead  Result Date: 5/3/2025  Sinus tachycardia with Premature atrial complexes Left axis deviation Anterior infarct , age undetermined Abnormal ECG When compared with ECG of 19-NOV-2024 10:36, Premature ventricular complexes are no longer Present Premature atrial complexes are now Present Vent. rate has increased BY  44 BPM See ED provider note for full interpretation and clinical correlation Confirmed by Carrie Graves ([id number]) on 5/3/2025 10:15:47 AM    CT angio chest for pulmonary  embolism  Result Date: 5/2/2025  STUDY: CT Angiogram of the Chest; 05/02/2025 08:04PM INDICATION: Short of breath, tachycardia, elevated top. COMPARISON: CT CAP 04/16/2024; XR Chest 05/02/2025, 11/19/2024. ACCESSION NUMBER(S): [id number] ORDERING CLINICIAN: LOKESH GILBERT TECHNIQUE:  CTA of the chest was performed with intravenous contrast. Images are reviewed and processed at a workstation according to the CT angiogram protocol with 3-D and/or MIP post processing imaging generated.  Omnipaque 350-75 mL was administered intravenously.  1090 DICOM images received. Automated mA/kV exposure control was utilized and patient examination was performed in strict accordance with principles of ALARA. FINDINGS: Pulmonary arteries are adequately opacified without acute or chronic filling defects.  The thoracic aorta is normal in course and caliber without dissection or aneurysm. The heart is enlarged without pericardial effusion.  There are postprocedural changes from transcapillary aortic valve replacement. Thoracic lymph nodes are not enlarged. There is a mild right and trace left pleural effusion.  Mild pulmonary edema.  There is calcification of the mitral annulus.  No pneumothorax.  The airways are patent. No suspicious nodules. Upper abdomen demonstrates no acute pathology.  There is significant atherosclerotic disease of the abdominal aorta There are no acute fractures.  No suspicious bony lesions.  Mild convexity of the thoracic spine to the right of the midline.    1. No acute or chronic pulmonary emboli. 2. No thoracic aortic aneurysm or dissection. 3. Cardiomegaly with mild pulmonary edema. 4. Mild right and trace left pleural effusion. 5. Postprocedural changes from transcapillary aortic valve replacement. Signed by Lyod Plascencia MD    XR chest 1 view  Result Date: 5/2/2025  STUDY: Chest Radiograph;  5/2/2025 6:37 PM INDICATION: Chest pain. COMPARISON: 11/19/2024 XR Chest. ACCESSION NUMBER(S): [id number]  ORDERING CLINICIAN: LOKESH GILBERT TECHNIQUE:  Frontal chest was obtained at 18:36 hours. FINDINGS: CARDIOMEDIASTINAL SILHOUETTE: Cardiomediastinal silhouette is normal in size and configuration.  LUNGS: Lungs are clear.  ABDOMEN: No remarkable upper abdominal findings.  BONES: No acute osseous changes.    No acute pulmonary abnormality. Signed by Jose Adamson MD       RADIOLOGY:     Transthoracic Echo Complete   Final Result      CT angio chest for pulmonary embolism   Final Result   1. No acute or chronic pulmonary emboli.   2. No thoracic aortic aneurysm or dissection.   3. Cardiomegaly with mild pulmonary edema.   4. Mild right and trace left pleural effusion.   5. Postprocedural changes from transcapillary aortic valve   replacement.   Signed by Lyod Plascencia MD      XR chest 1 view   Final Result   No acute pulmonary abnormality.   Signed by Jose Adamson MD          PROBLEM LIST   Problem List[3]    ASSESSMENT:     NSTEMI  Hypotension  Acute renal failure  Ischemic cardiomyopathy with severe depressed systolic function   Acute systolic congestive heart failure  Aortic valve stenosis/TAVR November 25, 2015  Coronary artery disease/PCI/angioplasty mid 50s  Mitral valve stenosis/mitral valve regurgitation  Hypertension  Hyperlipidemia  COPD  Hypothyroidism  Rheumatoid arthritis  Pulmonary hypertension      PLAN:     Patient nonresponsive.  Family at bedside.  End-of-life care.      Hospice consultation    Sign off cardiology.      Please do not hesitate to call with questions.  Electronically signed by Manish Emanuel DO, on 5/5/2025 at 10:19 AM         [1] aspirin, 81 mg, oral, Daily  atorvastatin, 20 mg, oral, Nightly  clopidogrel, 75 mg, oral, Daily  fluticasone furoate-vilanteroL, 1 puff, inhalation, Daily  levothyroxine, 75 mcg, oral, Daily  [Held by provider] metoprolol succinate XL, 12.5 mg, oral, Daily  oxygen, , inhalation, Continuous - Inhalation  tiotropium, 2 puff, inhalation, Daily     [2]  sodium chloride 0.9%, 50 mL/hr, Last Rate: Stopped (05/05/25 1002)     [3]   Patient Active Problem List  Diagnosis    Asthma    Balance problem    Benign essential HTN    CAD S/P percutaneous coronary angioplasty    Chronic diastolic congestive heart failure    Chronic obstructive pulmonary disease (Multi)    Dysphonia    Eye problem    Generalized arthritis    Hypothyroidism    Mitral valve stenosis    Mixed hyperlipidemia    Periprosth fracture around unsp internal prosth joint, subs    Localized, primary osteoarthritis of shoulder region    Rotator cuff tear arthropathy of right shoulder    Primary osteoarthritis of right shoulder    Pulmonary hypertension (Multi)    S/P TAVR (transcatheter aortic valve replacement)    Weakness of both hips    Former smoker    Fall, initial encounter    [place]-prosthetic fracture around prosthetic hip    Impaired mobility and ADLs    Pain    Slow transit constipation    Bright red blood per rectum    BMI 21.0-21.9, adult    NSTEMI (non-ST elevated myocardial infarction) (Multi)

## 2025-05-05 NOTE — CARE PLAN
Problem: Skin  Goal: Decreased wound size/increased tissue granulation at next dressing change  Outcome: Progressing  Goal: Participates in plan/prevention/treatment measures  Outcome: Progressing  Goal: Prevent/manage excess moisture  Outcome: Progressing  Goal: Prevent/minimize sheer/friction injuries  Outcome: Progressing  Goal: Promote/optimize nutrition  Outcome: Progressing  Goal: Promote skin healing  Outcome: Progressing     Problem: Pain - Adult  Goal: Verbalizes/displays adequate comfort level or baseline comfort level  Outcome: Progressing     Problem: Safety - Adult  Goal: Free from fall injury  Outcome: Progressing     Problem: Discharge Planning  Goal: Discharge to home or other facility with appropriate resources  Outcome: Progressing     Problem: Chronic Conditions and Co-morbidities  Goal: Patient's chronic conditions and co-morbidity symptoms are monitored and maintained or improved  Outcome: Progressing     Problem: Nutrition  Goal: Nutrient intake appropriate for maintaining nutritional needs  Outcome: Progressing     Problem: Fall/Injury  Goal: Not fall by end of shift  Outcome: Progressing  Goal: Be free from injury by end of the shift  Outcome: Progressing  Goal: Verbalize understanding of personal risk factors for fall in the hospital  Outcome: Progressing  Goal: Verbalize understanding of risk factor reduction measures to prevent injury from fall in the home  Outcome: Progressing  Goal: Use assistive devices by end of the shift  Outcome: Progressing  Goal: Pace activities to prevent fatigue by end of the shift  Outcome: Progressing   The patient's goals for the shift include      The clinical goals for the shift include Patient will remain free of injury throughout shift

## 2025-05-05 NOTE — PROGRESS NOTES
Occupational Therapy                 Therapy Communication Note    Patient Name: Gracie Carlson  MRN: [id number]  Department: [facility]  Room: [facility]  Today's Date: 5/5/2025     Discipline: Occupational Therapy    Missed Visit Reason: Missed Visit Reason:  (await hospice meeting and family decision.)  Per EMR, family meeting with hospice at 9:30am this date.

## 2025-05-05 NOTE — NURSING NOTE
RN Hospice Note    Gracie Carlson is a Hospice Patient.   Hospice terminal diagnosis: Heart Failure  Physician: Dr. Gama  Visit type: Admission    Comments/recommendations: This RN met with son Manish and daughter -in-law Priti. [facility] services and POC were introduced and discussed and consents were signed, Due to the fragility of the patient, the plan is to admit [facility] inpatient hospice today. Pt is unresponsive, but resting comfortably with comfort meds in place.  Collaborated with Lanette Her with [facility] admission and comfort meds.  [facility] RN will be here daily to assess patient.    Discharge Planning:  Patient to be discharged to [facility] inpatient hospice.    Please notify [facility] of any changes in condition. Thank you.  Office: [phone] (8 am-6:30 pm M-F and 8 am-4:30 pm weekends and holidays)   [phone] (6:30 pm-8 am M-F and 4:30 pm-8 am weekends and holidays)    HAIDER ELLIS RN

## 2025-05-05 NOTE — PROGRESS NOTES
ASSESSMENT & PLAN:     89F with PM of HTN, DLD, CAD s/p remote PCI, aortic stenosis s/p TAVR, mitral stenosis, mitral regurgitation, COPD, hypothyroidism, chronic Hermila, pHTN, RA who presented with CP. Found to have NSTEMI.     NSTEMI  ICM  Acute HFrEF  Acute hypoxemic respiratory failure likely 2/2 above  Hypotension  FRANCES  HAGMA  Hyponatremia  Acute agitated delirium  CAD s/p remote PCI  Mitral valve stenosis and regurgitation  Hx of HTN  DLD  COPD  Hypothyroidism  RA  pHTN  Chronic anemia  Frequent falls  Thrombocytopenia    -cardiology saw here  -TTE showed worsened LVEF 30-35%, multiple RWMA, severe MR, mod to severe TR, intact TAVR, pHTN, IVC dilation  -poa wants conservative mgmt, and would not want any invasive eval such as lhc. NSTEMI was managed medically  -ASA 81, start Plavix, statin  -did not tolerate low dose BB even, became hypotensive. Will hold.   -hold off on further diuresis, developed renal failure. Cont gentle IVF for now  -prn IV morphine for air hunger and pain, PRN IV ativan for agitation  -O2 for comfort prn  -after further GOC discussions yesterday, pt was made comfort care and hospice was consulted. Pending hospice meeting today. Might be candidate for GIP.     DNR comfort measures  Dispo tbd    Samir Gama MD    SUBJECTIVE     Was further agitated yesterday evening, tachypneic. This AM is comfortable. Pending hospice meeting.     OBJECTIVE:       Last Recorded Vitals:  Vitals:    05/04/25 2324 05/05/25 0420 05/05/25 0700 05/05/25 0714   BP: 104/62 103/56  85/56   BP Location:  Left arm  Left arm   Patient Position:  Lying  Lying   Pulse: 104 94  106   Resp: (!) 36 22  23   Temp: 37 °C (98.6 °F) 36.6 °C (97.9 °F)  36.1 °C (97 °F)   TempSrc: Temporal Temporal  Temporal   SpO2: 98% 99%  98%   Weight:   54.3 kg (119 lb 11.4 oz)    Height:           Last I/O:  I/O last 3 completed shifts:  In: 150 (2.9 mL/kg) [IV Piggyback:150]  Out: - (0 mL/kg)   Weight: 52 kg     Physical Exam:  GEN: frail  appearing, appears stated age, NAD  CV: RRR, no m/r/g, no LE edema  LUNGS: reduced breath sounds, faint crackles  ABD: soft, NT  NEURO: A+Ox3, no FND    Inpatient Medications:  Scheduled Medications[1]    PRN Medications  PRN Medications[2]    Continuous Medications:  Continuous Medications[3]      LABS AND IMAGING:     Labs:  No results found for this or any previous visit (from the past 24 hours).       Imaging:  Transthoracic Echo Complete            [address] [address]   Tel [phone] Fax [phone]    TRANSTHORACIC ECHOCARDIOGRAM REPORT    Patient Name:       ROSALINDA DODSON MALINI   Reading Physician:    [id number] Manish Heath DO  Study Date:         5/3/2025             Ordering Provider:    [id number] VALENTE MARTINS  MRN/PID:            [id number]             Fellow:  Accession#:         [id number]         Nurse:  Date of Birth/Age:  6/2/1928 / 89 years  Sonographer:          Gely Cloud                                                                 [facility]  Gender Assigned at  F                    Additional Staff:  Birth:  Height:             167.64 cm            Admit Date:           5/2/2025  Weight:             50.35 kg             Admission Status:     Inpatient -                                                                 Routine  BSA / BMI:          1.56 m2 / 17.92      Department Location:  [address] [place] [address]                      kg/m2  Blood Pressure: 106 /58 mmHg    Study Type:    TRANSTHORACIC ECHO (TTE) COMPLETE  Diagnosis/ICD: Non ST elevation (NSTEMI) myocardial infarction-I21.4  Indication:    NSTEMI  CPT Codes:     Echo Complete w Full [id number]    Patient History:  Smoker:            Former.  Valve Disorders:   Aortic Valve Replacement.  Pertinent History: HTN, Hyperlipidemia, CAD, CHF, Dyspnea, COPD and TAVR #23                      Herman(2015).    Study Detail: The following Echo studies were performed: 2D, M-Mode, Doppler and                color flow. Definity used as a contrast agent for endocardial                border definition. Total contrast used for this procedure was 3 mL                via IV push.       PHYSICIAN INTERPRETATION:  Left Ventricle: The left ventricular systolic function is moderately decreased, with a visually estimated ejection fraction of 30-35%. Wall motion is abnormal. The left ventricular cavity size is normal. There is mild increased septal and normal posterior left ventricular wall thickness. Spectral Doppler shows a Grade II (pseudonormal pattern) of left ventricular diastolic filling with an elevated left atrial pressure.  LV Wall Scoring:  The entire apex, mid and apical anterior wall, mid and apical anterior septum,  mid and apical inferior septum, mid and apical inferior wall, mid inferolateral  segment, and mid anterolateral segment are hypokinetic.    Left Atrium: The left atrial size is moderately dilated.  Right Ventricle: The right ventricle is normal in size. There is normal right ventricular global systolic function.  Right Atrium: The right atrial size is mildly dilated.  Aortic Valve: There is a prosthetic aortic valve present. The aortic valve dimensionless index is 0.40. There is an Sutton transcatheter aortic valve prosthesis with a 23 mm reported size. There is mild aortic valve regurgitation. The peak instantaneous gradient of the aortic valve is 19 mmHg. The mean gradient of the aortic valve is 11 mmHg.  Mitral Valve: The mitral valve is normal in structure. There is moderate to severe thickening and calcification of the anterior and posterior mitral valve leaflets. There is evidence of moderate mitral valve stenosis. The doppler estimated mean and peak diastolic pressure gradients are 7 mmHg and 18 mmHg respectively. There is normal mitral valve leaflet mobility. There is moderate  mitral annular calcification. The peak instantaneous gradient of the mitral valve is 18 mmHg. There is severe mitral valve regurgitation. The mitral regurgitant orifice area is 34 mm2. The mitral regurgitant volume is 44.80 ml.  Tricuspid Valve: The tricuspid valve is structurally normal. There is normal tricuspid valve leaflet mobility. There is moderate to severe tricuspid regurgitation.  Pulmonic Valve: The pulmonic valve is structurally normal. There is moderate pulmonic valve regurgitation.  Pericardium: Small pericardial effusion.  Aorta: The aortic root is normal.  Pulmonary Artery: Pulmonary hypertension is present. The tricuspid regurgitant velocity is 3.18 m/s, and with an estimated right atrial pressure of 15 mmHg, the estimated pulmonary artery pressure is moderate to severely elevated with the RVSP at 55.4 mmHg.  Systemic Veins: The inferior vena cava appears moderately dilated.  In comparison to the previous echocardiogram(s): The left ventricular function is significantly worse.       CONCLUSIONS:   1. The left ventricular systolic function is moderately decreased, with a visually estimated ejection fraction of 30-35%.   2. Multiple segmental abnormalities exist. See findings.   3. Abnormal wall motion.   4. Spectral Doppler shows a Grade II (pseudonormal pattern) of left ventricular diastolic filling with an elevated left atrial pressure.   5. There is normal right ventricular global systolic function.   6. Normal sized right ventricle.   7. The left atrial size is moderately dilated.   8. There is moderate mitral valve stenosis.   9. There is moderate mitral annular calcification.  10. There is moderate to severe thickening and calcification of the anterior and posterior mitral valve leaflets.  11. Severe mitral valve regurgitation.  12. Moderate to severe tricuspid regurgitation.  13. There is an Sutton transcatheter aortic valve prosthesis with a 23 mm reported size.  14. Mild aortic valve  regurgitation.  15. Moderate pulmonic valve regurgitation.  16. The inferior vena cava appears moderately dilated.  17. Moderate to severely elevated pulmonary artery pressure.  18. Pulmonary hypertension is present.    QUANTITATIVE DATA SUMMARY:     2D MEASUREMENTS:             Normal Ranges:  LAs:             4.80 cm     (2.7-4.0cm)  IVSd:            1.01 cm     (0.6-1.1cm)  LVPWd:           0.86 cm     (0.6-1.1cm)  LVIDd:           4.34 cm     (3.9-5.9cm)  LVIDs:           3.68 cm  LV Mass Index:   84.5 g/m2  LVEDV Index:     66.37 ml/m2  LV % FS          15.2 %       LEFT ATRIUM:                  Normal Ranges:  LA Vol A4C:        83.9 ml    (22+/-6mL/m2)  LA Vol A2C:        71.3 ml  LA Vol BP:         81.0 ml  LA Vol Index A4C:  53.9ml/m2  LA Vol Index A2C:  45.8 ml/m2  LA Vol Index BP:   52.0 ml/m2  LA Area A4C:       25.1 cm2  LA Area A2C:       22.1 cm2  LA Major Axis A4C: 6.4 cm  LA Major Axis A2C: 5.8 cm  LA Volume Index:   48.3 ml/m2       RIGHT ATRIUM:                 Normal Ranges:  RA Vol A4C:        32.5 ml    (8.3-19.5ml)  RA Vol Index A4C:  20.9 ml/m2  RA Area A4C:       14.1 cm2  RA Major Axis A4C: 5.2 cm       AORTA MEASUREMENTS:         Normal Ranges:  Asc Ao, d:          2.73 cm (2.1-3.4cm)       LV SYSTOLIC FUNCTION:                       Normal Ranges:  EF-A4C View:    34 % (>=55%)  EF-A2C View:    33 %  EF-Biplane:     35 %  EF-Visual:      33 %  LV EF Reported: 33 %       LV DIASTOLIC FUNCTION:           Normal Ranges:  MV Peak E:             2.15 m/s  (0.7-1.2 m/s)  MV Peak A:             1.11 m/s  (0.42-0.7 m/s)  E/A Ratio:             1.94      (1.0-2.2)  MV e'                  0.052 m/s (>8.0)  MV lateral e'          0.05 m/s  MV medial e'           0.05 m/s  E/e' Ratio:            41.55     (<8.0)       MITRAL VALVE:           Normal Ranges:  MV Vmax:      2.13 m/s  (<=1.3m/s)  MV peak P.2 mmHg (<5mmHg)  MV mean P.7 mmHg  (<48mmHg)  MV DT:        220 msec  (150-240msec)        MITRAL INSUFFICIENCY:             Normal Ranges:  PISA Radius:          0.9 cm  MR VTI:               131.50 cm  MR Vmax:              486.50 cm/s  MR Alias Cezar:         30.8 cm/s  MR Volume:            44.80 ml  MR Flow Rt:           165.73 ml/s  MR EROA:              34 mm2       AORTIC VALVE:                      Normal Ranges:  AoV Vmax:                2.16 m/s  (<=1.7m/s)  AoV Peak P.7 mmHg (<20mmHg)  AoV Mean P.0 mmHg (1.7-11.5mmHg)  LVOT Max Cezar:            0.88 m/s  (<=1.1m/s)  AoV VTI:                 41.80 cm  (18-25cm)  LVOT VTI:                16.90 cm  LVOT Diameter:           1.70 cm   (1.8-2.4cm)  AoV Area, VTI:           0.92 cm2  (2.5-5.5cm2)  AoV Area,Vmax:           0.92 cm2  (2.5-4.5cm2)  AoV Dimensionless Index: 0.40       AORTIC INSUFFICIENCY:  AI Vmax:       2.07 m/s  AI Half-time:  197 msec  AI Decel Rate: 253.50 cm/s2       RIGHT VENTRICLE:  RV Basal 3.52 cm  RV Mid   2.37 cm  RV Major 6.4 cm  TAPSE:   16.0 mm  RV s'    0.11 m/s       TRICUSPID VALVE/RVSP:          Normal Ranges:  Peak TR Velocity:     3.18 m/s  RV Syst Pressure:     55 mmHg  (< 30mmHg)  IVC Diam:             2.52 cm       PULMONIC VALVE:          Normal Ranges:  PV Accel Time:  98 msec  (>120ms)  PV Max Cezar:     0.6 m/s  (0.6-0.9m/s)  PV Max P.4 mmHg       [id number] Heath DO  Electronically signed on 2025 at 11:02:55 AM       Wall Scoring       ** Final **            [1] aspirin, 81 mg, oral, Daily  atorvastatin, 20 mg, oral, Nightly  clopidogrel, 75 mg, oral, Daily  fluticasone furoate-vilanteroL, 1 puff, inhalation, Daily  levothyroxine, 75 mcg, oral, Daily  [Held by provider] metoprolol succinate XL, 12.5 mg, oral, Daily  oxygen, , inhalation, Continuous - Inhalation  tiotropium, 2 puff, inhalation, Daily     [2] PRN medications: acetaminophen **OR** acetaminophen **OR** acetaminophen, albuterol, glycopyrrolate, ipratropium-albuteroL, LORazepam, menthol-zinc oxide,  morphine, nitroglycerin  [3] sodium chloride 0.9%, 50 mL/hr, Last Rate: 50 mL/hr (05/04/25 9651)

## 2025-05-05 NOTE — PROGRESS NOTES
Physical Therapy                 Therapy Communication Note    Patient Name: Gracie Carlson  MRN: [id number]  Department: [facility]  Room: [address] [place] [address]  Today's Date: 5/5/2025     Discipline: Physical Therapy          Missed Visit Reason: Missed Visit Reason: Other (Comment) (hold therapy pending hospice meeting today 5/5/25 .)        Comment: hospice meeting 5/5/25 930am .

## 2025-05-05 NOTE — DISCHARGE SUMMARY
DISCHARGE DIAGNOSIS     NSTEMI  ICM  Acute HFrEF  Acute hypoxemic respiratory failure likely 2/2 above  Severe MR and TR  Hypotension  FRANCES  HAGMA  Hyponatremia  Acute agitated delirium  CAD s/p remote PCI  Thrombocytopenia    HOSPITAL COURSE AND DETAILS     89F with PM of HTN, DLD, CAD s/p remote PCI, aortic stenosis s/p TAVR, mitral stenosis, mitral regurgitation, COPD, hypothyroidism, chronic Hermila, pHTN, RA who presented with CP. Found to have NSTEMI. Her TTE showed worsened LVEF, multiple RWMA, severe MR, severe TR. She had signs of ADHF as well. Her POA wanted conservative mgmt given her advanced age, dementia, other chronic medical co morbidities. She was attempted to be managed medically, but did not tolerate even low dose BB or diuresis. Developed hypotension, renal failure, continued hypoxemic respiratory failure, agitated delirium. She was made comfort care per POA, and hospice was consulted. She was transitioned to inpatient hospice.     Total time spent on dc services 32 minutes.     DISCHARGE PHYSICAL EXAM     Last Recorded Vitals:  Vitals:    05/04/25 2324 05/05/25 0420 05/05/25 0700 05/05/25 0714   BP: 104/62 103/56  85/56   BP Location:  Left arm  Left arm   Patient Position:  Lying  Lying   Pulse: 104 94  106   Resp: (!) 36 22  23   Temp: 37 °C (98.6 °F) 36.6 °C (97.9 °F)  36.1 °C (97 °F)   TempSrc: Temporal Temporal  Temporal   SpO2: 98% 99%  98%   Weight:   54.3 kg (119 lb 11.4 oz)    Height:           Physical Exam:  GEN: ill appearing, comfortable       PERTINENT LABS AND IMAGING     Results for orders placed or performed during the hospital encounter of 05/02/25 (from the past 96 hours)   CBC and Auto Differential   Result Value Ref Range    WBC 14.3 (H) 4.4 - 11.3 x10*3/uL    nRBC 0.0 0.0 - 0.0 /100 WBCs    RBC 3.68 (L) 4.00 - 5.20 x10*6/uL    Hemoglobin 10.7 (L) 12.0 - 16.0 g/dL    Hematocrit 31.9 (L) 36.0 - 46.0 %    MCV 87 80 - 100 fL    MCH 29.1 26.0 - 34.0 pg    MCHC 33.5 32.0 - 36.0 g/dL     RDW 13.2 11.5 - 14.5 %    Platelets 129 (L) 150 - 450 x10*3/uL    Neutrophils % 87.5 40.0 - 80.0 %    Immature Granulocytes %, Automated 0.6 0.0 - 0.9 %    Lymphocytes % 3.1 13.0 - 44.0 %    Monocytes % 8.7 2.0 - 10.0 %    Eosinophils % 0.0 0.0 - 6.0 %    Basophils % 0.1 0.0 - 2.0 %    Neutrophils Absolute 12.50 (H) 1.60 - 5.50 x10*3/uL    Immature Granulocytes Absolute, Automated 0.08 0.00 - 0.50 x10*3/uL    Lymphocytes Absolute 0.45 (L) 0.80 - 3.00 x10*3/uL    Monocytes Absolute 1.24 (H) 0.05 - 0.80 x10*3/uL    Eosinophils Absolute 0.00 0.00 - 0.40 x10*3/uL    Basophils Absolute 0.02 0.00 - 0.10 x10*3/uL   Comprehensive Metabolic Panel   Result Value Ref Range    Glucose 142 (H) 74 - 99 mg/dL    Sodium 131 (L) 136 - 145 mmol/L    Potassium 4.1 3.5 - 5.3 mmol/L    Chloride 102 98 - 107 mmol/L    Bicarbonate 21 21 - 32 mmol/L    Anion Gap 12 10 - 20 mmol/L    Urea Nitrogen 28 (H) 6 - 23 mg/dL    Creatinine 1.00 0.50 - 1.05 mg/dL    eGFR 52 (L) >60 mL/min/1.73m*2    Calcium 8.6 8.6 - 10.3 mg/dL    Albumin 3.6 3.4 - 5.0 g/dL    Alkaline Phosphatase 57 33 - 136 U/L    Total Protein 6.1 (L) 6.4 - 8.2 g/dL    AST 31 9 - 39 U/L    Bilirubin, Total 0.9 0.0 - 1.2 mg/dL    ALT 20 7 - 45 U/L   Magnesium   Result Value Ref Range    Magnesium 1.51 (L) 1.60 - 2.40 mg/dL   B-Type Natriuretic Peptide   Result Value Ref Range     (H) 0 - 99 pg/mL   Lactate   Result Value Ref Range    Lactate 2.1 (H) 0.4 - 2.0 mmol/L   Protime-INR   Result Value Ref Range    Protime 11.9 9.8 - 12.4 seconds    INR 1.1 0.9 - 1.1   Sars-CoV-2, Influenza A/B and RSV PCR   Result Value Ref Range    Coronavirus 2019, PCR Not Detected Not Detected    Flu A Result Not Detected Not Detected    Flu B Result Not Detected Not Detected    RSV PCR Not Detected Not Detected   Troponin I, High Sensitivity, Initial   Result Value Ref Range    Troponin I, High Sensitivity 5,657 (HH) 0 - 13 ng/L   Lipid Panel   Result Value Ref Range    Cholesterol 138 0 -  199 mg/dL    HDL-Cholesterol 63.7 mg/dL    Cholesterol/HDL Ratio 2.2     LDL Calculated 59 <=99 mg/dL    VLDL 16 0 - 40 mg/dL    Triglycerides 78 0 - 149 mg/dL    Non HDL Cholesterol 74 0 - 149 mg/dL   ECG 12 lead   Result Value Ref Range    Ventricular Rate 121 BPM    Atrial Rate 121 BPM    KS Interval 144 ms    QRS Duration 72 ms    QT Interval 322 ms    QTC Calculation(Bazett) 457 ms    P Axis 81 degrees    R Axis -65 degrees    T Axis 64 degrees    QRS Count 20 beats    Q Onset 218 ms    P Onset 146 ms    P Offset 214 ms    T Offset 379 ms    QTC Fredericia 406 ms   Troponin, High Sensitivity, 1 Hour   Result Value Ref Range    Troponin I, High Sensitivity 5,893 (HH) 0 - 13 ng/L   Lactate   Result Value Ref Range    Lactate 1.5 0.4 - 2.0 mmol/L   ECG 12 lead   Result Value Ref Range    Ventricular Rate 116 BPM    Atrial Rate 116 BPM    KS Interval 152 ms    QRS Duration 76 ms    QT Interval 342 ms    QTC Calculation(Bazett) 475 ms    P Axis 80 degrees    R Axis 1 degrees    T Axis 69 degrees    QRS Count 19 beats    Q Onset 226 ms    P Onset 150 ms    P Offset 223 ms    T Offset 397 ms    QTC Fredericia 426 ms   aPTT - baseline   Result Value Ref Range    aPTT 31 26 - 36 seconds   Troponin I, High Sensitivity   Result Value Ref Range    Troponin I, High Sensitivity 5,726 (HH) 0 - 13 ng/L   CBC   Result Value Ref Range    WBC 15.7 (H) 4.4 - 11.3 x10*3/uL    nRBC 0.0 0.0 - 0.0 /100 WBCs    RBC 3.48 (L) 4.00 - 5.20 x10*6/uL    Hemoglobin 10.3 (L) 12.0 - 16.0 g/dL    Hematocrit 29.9 (L) 36.0 - 46.0 %    MCV 86 80 - 100 fL    MCH 29.6 26.0 - 34.0 pg    MCHC 34.4 32.0 - 36.0 g/dL    RDW 13.4 11.5 - 14.5 %    Platelets 118 (L) 150 - 450 x10*3/uL   Renal Function Panel   Result Value Ref Range    Glucose 195 (H) 74 - 99 mg/dL    Sodium 131 (L) 136 - 145 mmol/L    Potassium 4.4 3.5 - 5.3 mmol/L    Chloride 102 98 - 107 mmol/L    Bicarbonate 19 (L) 21 - 32 mmol/L    Anion Gap 14 10 - 20 mmol/L    Urea Nitrogen 30 (H) 6 -  23 mg/dL    Creatinine 1.01 0.50 - 1.05 mg/dL    eGFR 51 (L) >60 mL/min/1.73m*2    Calcium 8.3 (L) 8.6 - 10.3 mg/dL    Phosphorus 3.7 2.5 - 4.9 mg/dL    Albumin 3.4 3.4 - 5.0 g/dL   Magnesium   Result Value Ref Range    Magnesium 2.34 1.60 - 2.40 mg/dL   Heparin Assay, UFH   Result Value Ref Range    Heparin Unfractionated 0.5 See Comment Below for Therapeutic Ranges IU/mL   Heparin Assay   Result Value Ref Range    Heparin Unfractionated 0.4 See Comment Below for Therapeutic Ranges IU/mL   Transthoracic Echo Complete   Result Value Ref Range    AV mn grad 11 mmHg    AV pk lynne 2.16 m/s    LV Biplane EF 35 %    LVOT diam 1.70 cm    MV E/A ratio 1.94     Tricuspid annular plane systolic excursion 1.6 cm    LA vol index A/L 52.0 ml/m2    LV EF 33 %    RV free wall pk S' 11.20 cm/s    RVSP 55.4 mmHg    LVIDd 4.34 cm    Aortic Valve Area by Continuity of Peak Velocity 0.92 cm2    AV pk grad 19 mmHg    Aortic Valve Area by Continuity of VTI 0.92 cm2    LV A4C EF 34.1    ECG 12 lead   Result Value Ref Range    Ventricular Rate 91 BPM    Atrial Rate 91 BPM    [place] Interval 152 ms    QRS Duration 72 ms    QT Interval 356 ms    QTC Calculation(Bazett) 437 ms    P Axis 87 degrees    R Axis -31 degrees    T Axis -8 degrees    QRS Count 15 beats    Q Onset 224 ms    P Onset 148 ms    P Offset 213 ms    T Offset 402 ms    QTC Fredericia 409 ms   POCT GLUCOSE   Result Value Ref Range    POCT Glucose 181 (H) 74 - 99 mg/dL   CBC   Result Value Ref Range    WBC 14.4 (H) 4.4 - 11.3 x10*3/uL    nRBC 0.0 0.0 - 0.0 /100 WBCs    RBC 3.28 (L) 4.00 - 5.20 x10*6/uL    Hemoglobin 9.5 (L) 12.0 - 16.0 g/dL    Hematocrit 27.6 (L) 36.0 - 46.0 %    MCV 84 80 - 100 fL    MCH 29.0 26.0 - 34.0 pg    MCHC 34.4 32.0 - 36.0 g/dL    RDW 13.5 11.5 - 14.5 %    Platelets 133 (L) 150 - 450 x10*3/uL   Renal Function Panel   Result Value Ref Range    Glucose 128 (H) 74 - 99 mg/dL    Sodium 130 (L) 136 - 145 mmol/L    Potassium 5.2 3.5 - 5.3 mmol/L    Chloride  101 98 - 107 mmol/L    Bicarbonate 17 (L) 21 - 32 mmol/L    Anion Gap 17 10 - 20 mmol/L    Urea Nitrogen 47 (H) 6 - 23 mg/dL    Creatinine 1.64 (H) 0.50 - 1.05 mg/dL    eGFR 29 (L) >60 mL/min/1.73m*2    Calcium 8.3 (L) 8.6 - 10.3 mg/dL    Phosphorus 4.2 2.5 - 4.9 mg/dL    Albumin 3.4 3.4 - 5.0 g/dL   Magnesium   Result Value Ref Range    Magnesium 2.33 1.60 - 2.40 mg/dL   SST TOP   Result Value Ref Range    Extra Tube 293    POCT GLUCOSE   Result Value Ref Range    POCT Glucose 127 (H) 74 - 99 mg/dL        Transthoracic Echo Complete   Final Result      CT angio chest for pulmonary embolism   Final Result   1. No acute or chronic pulmonary emboli.   2. No thoracic aortic aneurysm or dissection.   3. Cardiomegaly with mild pulmonary edema.   4. Mild right and trace left pleural effusion.   5. Postprocedural changes from transcapillary aortic valve   replacement.   Signed by Loyd Plascencia MD      XR chest 1 view   Final Result   No acute pulmonary abnormality.   Signed by Jose Adamson MD          Transthoracic Echo Complete  Result Date: 5/4/2025          [place] [address]  Tel [phone] Fax [phone] TRANSTHORACIC ECHOCARDIOGRAM REPORT Patient Name:       ROSALINDA NAYAK   Reading Physician:    [id number] Manish Heath DO Study Date:         5/3/2025             Ordering Provider:    [id number] VALENTE MARTINS MRN/PID:            [id number]             Fellow: Accession#:         [id number]         Nurse: Date of Birth/Age:  6/2/1928 / 89 years  Sonographer:          Gely Cloud RDCS Gender Assigned at  F                    Additional Staff: Birth: Height:             167.64 cm            Admit Date:           5/2/2025 Weight:             50.35 kg             Admission Status:      Inpatient -                                                                Routine BSA / BMI:          1.56 m2 / 17.92      Department Location:  [address] [place], [place] [address]                     kg/m2 Blood Pressure: 106 /58 mmHg Study Type:    TRANSTHORACIC ECHO (TTE) COMPLETE Diagnosis/ICD: Non ST elevation (NSTEMI) myocardial infarction-I21.4 Indication:    NSTEMI CPT Codes:     Echo Complete w Full [id number] Patient History: Smoker:            Former. Valve Disorders:   Aortic Valve Replacement. Pertinent History: HTN, Hyperlipidemia, CAD, CHF, Dyspnea, COPD and TAVR #23                    Sutton(2015). Study Detail: The following Echo studies were performed: 2D, M-Mode, Doppler and               color flow. Definity used as a contrast agent for endocardial               border definition. Total contrast used for this procedure was 3 mL               via IV push.  PHYSICIAN INTERPRETATION: Left Ventricle: The left ventricular systolic function is moderately decreased, with a visually estimated ejection fraction of 30-35%. Wall motion is abnormal. The left ventricular cavity size is normal. There is mild increased septal and normal posterior left ventricular wall thickness. Spectral Doppler shows a Grade II (pseudonormal pattern) of left ventricular diastolic filling with an elevated left atrial pressure. LV Wall Scoring: The entire apex, mid and apical anterior wall, mid and apical anterior septum, mid and apical inferior septum, mid and apical inferior wall, mid inferolateral segment, and mid anterolateral segment are hypokinetic. Left Atrium: The left atrial size is moderately dilated. Right Ventricle: The right ventricle is normal in size. There is normal right ventricular global systolic function. Right Atrium: The right atrial size is mildly dilated. Aortic Valve: There is a prosthetic aortic valve present. The aortic valve dimensionless index is 0.40. There is an Sutton transcatheter aortic valve prosthesis with a 23 mm  reported size. There is mild aortic valve regurgitation. The peak instantaneous gradient of the aortic valve is 19 mmHg. The mean gradient of the aortic valve is 11 mmHg. Mitral Valve: The mitral valve is normal in structure. There is moderate to severe thickening and calcification of the anterior and posterior mitral valve leaflets. There is evidence of moderate mitral valve stenosis. The doppler estimated mean and peak diastolic pressure gradients are 7 mmHg and 18 mmHg respectively. There is normal mitral valve leaflet mobility. There is moderate mitral annular calcification. The peak instantaneous gradient of the mitral valve is 18 mmHg. There is severe mitral valve regurgitation. The mitral regurgitant orifice area is 34 mm2. The mitral regurgitant volume is 44.80 ml. Tricuspid Valve: The tricuspid valve is structurally normal. There is normal tricuspid valve leaflet mobility. There is moderate to severe tricuspid regurgitation. Pulmonic Valve: The pulmonic valve is structurally normal. There is moderate pulmonic valve regurgitation. Pericardium: Small pericardial effusion. Aorta: The aortic root is normal. Pulmonary Artery: Pulmonary hypertension is present. The tricuspid regurgitant velocity is 3.18 m/s, and with an estimated right atrial pressure of 15 mmHg, the estimated pulmonary artery pressure is moderate to severely elevated with the RVSP at 55.4 mmHg. Systemic Veins: The inferior vena cava appears moderately dilated. In comparison to the previous echocardiogram(s): The left ventricular function is significantly worse.  CONCLUSIONS:  1. The left ventricular systolic function is moderately decreased, with a visually estimated ejection fraction of 30-35%.  2. Multiple segmental abnormalities exist. See findings.  3. Abnormal wall motion.  4. Spectral Doppler shows a Grade II (pseudonormal pattern) of left ventricular diastolic filling with an elevated left atrial pressure.  5. There is normal right  ventricular global systolic function.  6. Normal sized right ventricle.  7. The left atrial size is moderately dilated.  8. There is moderate mitral valve stenosis.  9. There is moderate mitral annular calcification. 10. There is moderate to severe thickening and calcification of the anterior and posterior mitral valve leaflets. 11. Severe mitral valve regurgitation. 12. Moderate to severe tricuspid regurgitation. 13. There is an Sutton transcatheter aortic valve prosthesis with a 23 mm reported size. 14. Mild aortic valve regurgitation. 15. Moderate pulmonic valve regurgitation. 16. The inferior vena cava appears moderately dilated. 17. Moderate to severely elevated pulmonary artery pressure. 18. Pulmonary hypertension is present. QUANTITATIVE DATA SUMMARY:  2D MEASUREMENTS:             Normal Ranges: LAs:             4.80 cm     (2.7-4.0cm) IVSd:            1.01 cm     (0.6-1.1cm) LVPWd:           0.86 cm     (0.6-1.1cm) LVIDd:           4.34 cm     (3.9-5.9cm) LVIDs:           3.68 cm LV Mass Index:   84.5 g/m2 LVEDV Index:     66.37 ml/m2 LV % FS          15.2 %  LEFT ATRIUM:                  Normal Ranges: LA Vol A4C:        83.9 ml    (22+/-6mL/m2) LA Vol A2C:        71.3 ml LA Vol BP:         81.0 ml LA Vol Index A4C:  53.9ml/m2 LA Vol Index A2C:  45.8 ml/m2 LA Vol Index BP:   52.0 ml/m2 LA Area A4C:       25.1 cm2 LA Area A2C:       22.1 cm2 LA Major Axis A4C: 6.4 cm LA Major Axis A2C: 5.8 cm LA Volume Index:   48.3 ml/m2  RIGHT ATRIUM:                 Normal Ranges: RA Vol A4C:        32.5 ml    (8.3-19.5ml) RA Vol Index A4C:  20.9 ml/m2 RA Area A4C:       14.1 cm2 RA Major Axis A4C: 5.2 cm  AORTA MEASUREMENTS:         Normal Ranges: Asc Ao, d:          2.73 cm (2.1-3.4cm)  LV SYSTOLIC FUNCTION:                      Normal Ranges: EF-A4C View:    34 % (>=55%) EF-A2C View:    33 % EF-Biplane:     35 % EF-Visual:      33 % LV EF Reported: 33 %  LV DIASTOLIC FUNCTION:           Normal Ranges: MV Peak E:              2.15 m/s  (0.7-1.2 m/s) MV Peak A:             1.11 m/s  (0.42-0.7 m/s) E/A Ratio:             1.94      (1.0-2.2) MV e'                  0.052 m/s (>8.0) MV lateral e'          0.05 m/s MV medial e'           0.05 m/s E/e' Ratio:            41.55     (<8.0)  MITRAL VALVE:           Normal Ranges: MV Vmax:      2.13 m/s  (<=1.3m/s) MV peak P.2 mmHg (<5mmHg) MV mean P.7 mmHg  (<48mmHg) MV DT:        220 msec  (150-240msec)  MITRAL INSUFFICIENCY:             Normal Ranges: PISA Radius:          0.9 cm MR VTI:               131.50 cm MR Vmax:              486.50 cm/s MR Alias Cezar:         30.8 cm/s MR Volume:            44.80 ml MR Flow Rt:           165.73 ml/s MR EROA:              34 mm2  AORTIC VALVE:                      Normal Ranges: AoV Vmax:                2.16 m/s  (<=1.7m/s) AoV Peak P.7 mmHg (<20mmHg) AoV Mean P.0 mmHg (1.7-11.5mmHg) LVOT Max Cezar:            0.88 m/s  (<=1.1m/s) AoV VTI:                 41.80 cm  (18-25cm) LVOT VTI:                16.90 cm LVOT Diameter:           1.70 cm   (1.8-2.4cm) AoV Area, VTI:           0.92 cm2  (2.5-5.5cm2) AoV Area,Vmax:           0.92 cm2  (2.5-4.5cm2) AoV Dimensionless Index: 0.40  AORTIC INSUFFICIENCY: AI Vmax:       2.07 m/s AI Half-time:  197 msec AI Decel Rate: 253.50 cm/s2  RIGHT VENTRICLE: RV Basal 3.52 cm RV Mid   2.37 cm RV Major 6.4 cm TAPSE:   16.0 mm RV s'    0.11 m/s  TRICUSPID VALVE/RVSP:          Normal Ranges: Peak TR Velocity:     3.18 m/s RV Syst Pressure:     55 mmHg  (< 30mmHg) IVC Diam:             2.52 cm  PULMONIC VALVE:          Normal Ranges: PV Accel Time:  98 msec  (>120ms) PV Max Cezar:     0.6 m/s  (0.6-0.9m/s) PV Max P.4 mmHg  [id number] Manish Heath DO Electronically signed on 2025 at 11:02:55 AM  Wall Scoring  ** Final **       DISCHARGE MEDICATIONS        Your medication list        ASK your doctor about these medications        Instructions Last Dose Given Next Dose  Due   Advair Diskus 100-50 mcg/dose diskus inhaler  Generic drug: fluticasone propion-salmeteroL           ascorbic acid 500 mg tablet  Commonly known as: Vitamin C           atorvastatin 20 mg tablet  Commonly known as: Lipitor           bisacodyl 5 mg EC tablet  Commonly known as: Dulcolax           cyanocobalamin 1,000 mcg tablet  Commonly known as: Vitamin B-12           levothyroxine 75 mcg tablet  Commonly known as: Synthroid, Levoxyl           losartan 50 mg tablet  Commonly known as: Cozaar      Take 1 tablet (50 mg) by mouth once daily.       Miralax 17 gram/dose powder  Generic drug: polyethylene glycol           multivitamin tablet           nitroglycerin 0.4 mg SL tablet  Commonly known as: Nitrostat      Place 1 tablet (0.4 mg) under the tongue every 5 minutes if needed for chest pain (FOR UP TO 3 DOSES FOR CHEST PAIN. CALL 911 IF PAIN PERSISTS).       ondansetron 4 mg tablet  Commonly known as: Zofran  Ask about: Which instructions should I use?      Take 1 tablet (4 mg) by mouth every 8 hours if needed for nausea or vomiting.       pantoprazole 40 mg EC tablet  Commonly known as: ProtoNix      Take 1 tablet (40 mg) by mouth once daily in the morning. Take before meals. Do not crush, chew, or split.       tiotropium 18 mcg inhalation capsule  Commonly known as: Spiriva           TRELEGY ELLIPTA INHL           Vitamin D3 25 mcg (1,000 units) tablet  Generic drug: cholecalciferol                    OUTPATIENT FOLLOW-UP     Future Appointments   Date Time Provider Department Center   6/18/2025 10:15 AM ALEJANDRO [id number] ECHO/VASC 3 [id number]   6/25/2025 10:15 AM Jose Harris MD [id number] [place]

## 2025-05-05 NOTE — H&P
Medical Group History and Physical    ASSESSMENT & PLAN:     89F with PM of HTN, DLD, CAD s/p remote PCI, aortic stenosis s/p TAVR, mitral stenosis, mitral regurgitation, COPD, hypothyroidism, chronic Hermila, pHTN, RA who presented with CP. Found to have NSTEMI and ADHF. She was attempted to be managed medically, however did not tolerate, and developed complications of hypotension, renal failure, acute hypoxemic respiratory failure, agitated delirium. She was transitioned to comfort care and admitted to inpt hospice.     NSTEMI  ICM  Acute HFrEF  Acute hypoxemic respiratory failure likely 2/2 above  Severe MR and TR  Hypotension  FRANCES  HAGMA  Hyponatremia  Acute agitated delirium  CAD s/p remote PCI  Thrombocytopenia    Plan:  -cont current comfort care orders    Samir Gama MD    HISTORY OF PRESENT ILLNESS:   Chief Complaint: inpatient hospice admission    History Of Present Illness:    89F with PM of HTN, DLD, CAD s/p remote PCI, aortic stenosis s/p TAVR, mitral stenosis, mitral regurgitation, COPD, hypothyroidism, chronic Hermila, pHTN, RA who presented with CP. Found to have NSTEMI. Her TTE showed worsened LVEF, multiple RWMA, severe MR, severe TR. She had signs of ADHF as well. Her POA wanted conservative mgmt given her advanced age, dementia, other chronic medical co morbidities. She was attempted to be managed medically, but did not tolerate even low dose BB or diuresis. Developed hypotension, renal failure, continued hypoxemic respiratory failure, agitated delirium. She was made comfort care per POA, and hospice was consulted. She was transitioned to inpatient hospice.     Review of systems: 10 point review of systems is otherwise negative except as mentioned above.    PAST HISTORIES:     Past Medical History:  She has a past medical history of Hypothyroidism, unspecified (01/15/2016), Personal history of diseases of the blood and blood-forming organs and certain disorders involving the immune mechanism  (11/13/2015), Personal history of other diseases of the circulatory system (01/15/2016), Personal history of other diseases of the circulatory system (01/15/2016), Personal history of other diseases of the musculoskeletal system and connective tissue (11/13/2015), and Pulmonary hypertension, unspecified (Multi) (01/15/2016).    Past Surgical History:  She has a past surgical history that includes Coronary angioplasty (01/15/2016); Hysterectomy (01/15/2016); Aortic valve replacement (01/15/2016); Esophagogastroduodenoscopy (01/15/2016); Knee surgery (01/15/2016); Other surgical history (05/19/2022); Other surgical history (05/19/2022); Other surgical history (05/19/2022); Other surgical history (05/19/2022); Other surgical history (05/19/2022); CT angio abdomen pelvis w and or wo IV IV contrast (11/6/2015); MR angio head wo IV contrast (1/22/2023); and MR angio neck wo IV contrast (1/22/2023).      Social History:  She reports that she has quit smoking. Her smoking use included cigarettes. She has never used smokeless tobacco. She reports that she does not drink alcohol and does not use drugs.    Family History:  Family History[1]     Allergies:  Oxycodone and Sulfa (sulfonamide antibiotics)    OBJECTIVE:     Last Recorded Vitals:  There were no vitals filed for this visit.    Last I/O:  No intake/output data recorded.    Physical Exam:  GEN: ill appearing, comfortable    Scheduled Medications  Scheduled Medications[2]    PRN Medications  PRN Medications[3]    Continuous Medications  Continuous Medications[4]    Outpatient Medications:  Prior to Admission medications    Medication Sig Start Date End Date Taking? Authorizing Provider   ascorbic acid (Vitamin C) 500 mg tablet Take 1 tablet (500 mg) by mouth once daily.    Historical Provider, MD   atorvastatin (Lipitor) 20 mg tablet Take 1 tablet (20 mg) by mouth once daily at bedtime. 10/1/15   Historical Provider, MD   bisacodyl (Dulcolax) 5 mg EC tablet Take 1  tablet (5 mg) by mouth if needed. 3/10/22   Historical Provider, MD   cholecalciferol (Vitamin D3) 25 mcg (1,000 units) tablet Take 1 tablet (25 mcg) by mouth once daily.    Historical Provider, MD   cyanocobalamin (Vitamin B-12) 1,000 mcg tablet Take 1 tablet (1,000 mcg) by mouth once daily.    Historical Provider, MD   fluticasone propion-salmeteroL (Advair Diskus) 100-50 mcg/dose diskus inhaler Inhale 1 puff 2 times a day. Rinse mouth with water after use to reduce aftertaste and incidence of candidiasis. Do not swallow.    Historical Provider, MD   fluticasone/umeclidin/vilanter (TRELEGY ELLIPTA INHL) Inhale 1 puff once daily.    Historical Provider, MD   levothyroxine (Synthroid, Levoxyl) 75 mcg tablet Take 1 tablet (75 mcg) by mouth early in the morning.. Take on an empty stomach at the same time each day, either 30 to 60 minutes prior to breakfast    Historical Provider, MD   losartan (Cozaar) 50 mg tablet Take 1 tablet (50 mg) by mouth once daily. 12/9/24 12/9/25  Charles Barrera MD   multivitamin tablet Take 1 tablet by mouth once daily.    Historical Provider, MD   nitroglycerin (Nitrostat) 0.4 mg SL tablet Place 1 tablet (0.4 mg) under the tongue every 5 minutes if needed for chest pain (FOR UP TO 3 DOSES FOR CHEST PAIN. CALL 911 IF PAIN PERSISTS). 6/26/24 6/26/25  Jose Harris MD   ondansetron (Zofran) 4 mg tablet Take 1 tablet (4 mg) by mouth every 8 hours if needed for nausea or vomiting.  Patient not taking: Reported on 6/26/2024 4/24/24   WAQAS Harper   pantoprazole (ProtoNix) 40 mg EC tablet Take 1 tablet (40 mg) by mouth once daily in the morning. Take before meals. Do not crush, chew, or split.  Patient not taking: Reported on 6/26/2024 4/25/24 5/25/24  WAQAS Harper   polyethylene glycol (Miralax) 17 gram/dose powder Mix 17 g of powder and drink once daily as needed.    Historical Provider, MD   tiotropium (Spiriva) 18 mcg inhalation capsule Place 1 capsule  (18 mcg) into inhaler and inhale if needed.    Historical Provider, MD   meclizine (Antivert) 12.5 mg tablet Take 1 tablet (12.5 mg) by mouth 3 times a day as needed for dizziness.  5/2/25  Historical Provider, MD   ondansetron (Zofran) 4 mg tablet Take 1 tablet (4 mg) by mouth every 8 hours if needed for nausea or vomiting.  5/2/25  Historical Provider, MD       LABS AND IMAGING:     Labs:  No results found for this or any previous visit (from the past 24 hours).     Imaging:  Transthoracic Echo Complete            [place] [id number]   Tel [phone] Fax [phone]    TRANSTHORACIC ECHOCARDIOGRAM REPORT    Patient Name:       ROSALINDA WEST NAYAK   Reading Physician:    [id number] Manish Heath DO  Study Date:         5/3/2025             Ordering Provider:    [id number] VALENTE MARTINS  MRN/PID:            [id number]             Fellow:  Accession#:         [id number]         Nurse:  Date of Birth/Age:  6/2/1928 / 89 years  Sonographer:          Gely Cloud                                                                 [facility]  Gender Assigned at  F                    Additional Staff:  Birth:  Height:             167.64 cm            Admit Date:           5/2/2025  Weight:             50.35 kg             Admission Status:     Inpatient -                                                                 Routine  BSA / BMI:          1.56 m2 / 17.92      Department Location:  [address] [place] [address]  Blood Pressure: 106 /58 mmHg    Study Type:    TRANSTHORACIC ECHO (TTE) COMPLETE  Diagnosis/ICD: Non ST elevation (NSTEMI) myocardial infarction-I21.4  Indication:    NSTEMI  CPT Codes:     Echo Complete w Full [id number]    Patient History:  Smoker:            Former.  Valve Disorders:   Aortic Valve Replacement.  Pertinent History: HTN,  Hyperlipidemia, CAD, CHF, Dyspnea, COPD and TAVR #23                     Sutton(2015).    Study Detail: The following Echo studies were performed: 2D, M-Mode, Doppler and                color flow. Definity used as a contrast agent for endocardial                border definition. Total contrast used for this procedure was 3 mL                via IV push.       PHYSICIAN INTERPRETATION:  Left Ventricle: The left ventricular systolic function is moderately decreased, with a visually estimated ejection fraction of 30-35%. Wall motion is abnormal. The left ventricular cavity size is normal. There is mild increased septal and normal posterior left ventricular wall thickness. Spectral Doppler shows a Grade II (pseudonormal pattern) of left ventricular diastolic filling with an elevated left atrial pressure.  LV Wall Scoring:  The entire apex, mid and apical anterior wall, mid and apical anterior septum,  mid and apical inferior septum, mid and apical inferior wall, mid inferolateral  segment, and mid anterolateral segment are hypokinetic.    Left Atrium: The left atrial size is moderately dilated.  Right Ventricle: The right ventricle is normal in size. There is normal right ventricular global systolic function.  Right Atrium: The right atrial size is mildly dilated.  Aortic Valve: There is a prosthetic aortic valve present. The aortic valve dimensionless index is 0.40. There is an Sutton transcatheter aortic valve prosthesis with a 23 mm reported size. There is mild aortic valve regurgitation. The peak instantaneous gradient of the aortic valve is 19 mmHg. The mean gradient of the aortic valve is 11 mmHg.  Mitral Valve: The mitral valve is normal in structure. There is moderate to severe thickening and calcification of the anterior and posterior mitral valve leaflets. There is evidence of moderate mitral valve stenosis. The doppler estimated mean and peak diastolic pressure gradients are 7 mmHg and 18 mmHg  respectively. There is normal mitral valve leaflet mobility. There is moderate mitral annular calcification. The peak instantaneous gradient of the mitral valve is 18 mmHg. There is severe mitral valve regurgitation. The mitral regurgitant orifice area is 34 mm2. The mitral regurgitant volume is 44.80 ml.  Tricuspid Valve: The tricuspid valve is structurally normal. There is normal tricuspid valve leaflet mobility. There is moderate to severe tricuspid regurgitation.  Pulmonic Valve: The pulmonic valve is structurally normal. There is moderate pulmonic valve regurgitation.  Pericardium: Small pericardial effusion.  Aorta: The aortic root is normal.  Pulmonary Artery: Pulmonary hypertension is present. The tricuspid regurgitant velocity is 3.18 m/s, and with an estimated right atrial pressure of 15 mmHg, the estimated pulmonary artery pressure is moderate to severely elevated with the RVSP at 55.4 mmHg.  Systemic Veins: The inferior vena cava appears moderately dilated.  In comparison to the previous echocardiogram(s): The left ventricular function is significantly worse.       CONCLUSIONS:   1. The left ventricular systolic function is moderately decreased, with a visually estimated ejection fraction of 30-35%.   2. Multiple segmental abnormalities exist. See findings.   3. Abnormal wall motion.   4. Spectral Doppler shows a Grade II (pseudonormal pattern) of left ventricular diastolic filling with an elevated left atrial pressure.   5. There is normal right ventricular global systolic function.   6. Normal sized right ventricle.   7. The left atrial size is moderately dilated.   8. There is moderate mitral valve stenosis.   9. There is moderate mitral annular calcification.  10. There is moderate to severe thickening and calcification of the anterior and posterior mitral valve leaflets.  11. Severe mitral valve regurgitation.  12. Moderate to severe tricuspid regurgitation.  13. There is an Sutton transcatheter  aortic valve prosthesis with a 23 mm reported size.  14. Mild aortic valve regurgitation.  15. Moderate pulmonic valve regurgitation.  16. The inferior vena cava appears moderately dilated.  17. Moderate to severely elevated pulmonary artery pressure.  18. Pulmonary hypertension is present.    QUANTITATIVE DATA SUMMARY:     2D MEASUREMENTS:             Normal Ranges:  LAs:             4.80 cm     (2.7-4.0cm)  IVSd:            1.01 cm     (0.6-1.1cm)  LVPWd:           0.86 cm     (0.6-1.1cm)  LVIDd:           4.34 cm     (3.9-5.9cm)  LVIDs:           3.68 cm  LV Mass Index:   84.5 g/m2  LVEDV Index:     66.37 ml/m2  LV % FS          15.2 %       LEFT ATRIUM:                  Normal Ranges:  LA Vol A4C:        83.9 ml    (22+/-6mL/m2)  LA Vol A2C:        71.3 ml  LA Vol BP:         81.0 ml  LA Vol Index A4C:  53.9ml/m2  LA Vol Index A2C:  45.8 ml/m2  LA Vol Index BP:   52.0 ml/m2  LA Area A4C:       25.1 cm2  LA Area A2C:       22.1 cm2  LA Major Axis A4C: 6.4 cm  LA Major Axis A2C: 5.8 cm  LA Volume Index:   48.3 ml/m2       RIGHT ATRIUM:                 Normal Ranges:  RA Vol A4C:        32.5 ml    (8.3-19.5ml)  RA Vol Index A4C:  20.9 ml/m2  RA Area A4C:       14.1 cm2  RA Major Axis A4C: 5.2 cm       AORTA MEASUREMENTS:         Normal Ranges:  Asc Ao, d:          2.73 cm (2.1-3.4cm)       LV SYSTOLIC FUNCTION:                       Normal Ranges:  EF-A4C View:    34 % (>=55%)  EF-A2C View:    33 %  EF-Biplane:     35 %  EF-Visual:      33 %  LV EF Reported: 33 %       LV DIASTOLIC FUNCTION:           Normal Ranges:  MV Peak E:             2.15 m/s  (0.7-1.2 m/s)  MV Peak A:             1.11 m/s  (0.42-0.7 m/s)  E/A Ratio:             1.94      (1.0-2.2)  MV e'                  0.052 m/s (>8.0)  MV lateral e'          0.05 m/s  MV medial e'           0.05 m/s  E/e' Ratio:            41.55     (<8.0)       MITRAL VALVE:           Normal Ranges:  MV Vmax:      2.13 m/s  (<=1.3m/s)  MV peak P.2 mmHg  (<5mmHg)  MV mean P.7 mmHg  (<48mmHg)  MV DT:        220 msec  (150-240msec)       MITRAL INSUFFICIENCY:             Normal Ranges:  PISA Radius:          0.9 cm  MR VTI:               131.50 cm  MR Vmax:              486.50 cm/s  MR Alias Cezar:         30.8 cm/s  MR Volume:            44.80 ml  MR Flow Rt:           165.73 ml/s  MR EROA:              34 mm2       AORTIC VALVE:                      Normal Ranges:  AoV Vmax:                2.16 m/s  (<=1.7m/s)  AoV Peak P.7 mmHg (<20mmHg)  AoV Mean P.0 mmHg (1.7-11.5mmHg)  LVOT Max Cezar:            0.88 m/s  (<=1.1m/s)  AoV VTI:                 41.80 cm  (18-25cm)  LVOT VTI:                16.90 cm  LVOT Diameter:           1.70 cm   (1.8-2.4cm)  AoV Area, VTI:           0.92 cm2  (2.5-5.5cm2)  AoV Area,Vmax:           0.92 cm2  (2.5-4.5cm2)  AoV Dimensionless Index: 0.40       AORTIC INSUFFICIENCY:  AI Vmax:       2.07 m/s  AI Half-time:  197 msec  AI Decel Rate: 253.50 cm/s2       RIGHT VENTRICLE:  RV Basal 3.52 cm  RV Mid   2.37 cm  RV Major 6.4 cm  TAPSE:   16.0 mm  RV s'    0.11 m/s       TRICUSPID VALVE/RVSP:          Normal Ranges:  Peak TR Velocity:     3.18 m/s  RV Syst Pressure:     55 mmHg  (< 30mmHg)  IVC Diam:             2.52 cm       PULMONIC VALVE:          Normal Ranges:  PV Accel Time:  98 msec  (>120ms)  PV Max Cezar:     0.6 m/s  (0.6-0.9m/s)  PV Max P.4 mmHg       [id number] Manish Heath DO  Electronically signed on 2025 at 11:02:55 AM       Wall Scoring       ** Final **              [1]  Family History  Problem Relation Name Age of Onset   • Hypertension Mother     • Heart disease Father          RHEUMATIC   • Hypertension Father     • Coronary artery disease Father     • Other (ENDOCARDITIS) Father     [2]  oxygen, , inhalation, Continuous - Inhalation  [3]  PRN medications: albuterol, glycopyrrolate, ipratropium-albuteroL, LORazepam, morphine  [4]

## 2025-05-06 NOTE — DISCHARGE SUMMARY
DISCHARGE DIAGNOSIS     NSTEMI  ICM  Acute HFrEF  Acute hypoxemic respiratory failure likely 2/2 above  Severe MR and TR  Hypotension  FRANCES  HAGMA  Hyponatremia  Acute agitated delirium  CAD s/p remote PCI    HOSPITAL COURSE AND DETAILS     89F with PM of HTN, DLD, CAD s/p remote PCI, aortic stenosis s/p TAVR, mitral stenosis, mitral regurgitation, COPD, hypothyroidism, chronic Hermila, pHTN, RA who presented with CP. Found to have NSTEMI and ADHF. She was attempted to be managed medically, however did not tolerate, and developed complications of hypotension, renal failure, acute hypoxemic respiratory failure, agitated delirium. She was transitioned to comfort care and admitted to in hospice. She  on 25 @ 2230.    DISCHARGE PHYSICAL EXAM     Last Recorded Vitals:  Vitals:    25 1947   BP: 134/72   BP Location: Left arm   Patient Position: Lying   Pulse: (!) 126   Resp: 22   Temp: (!) 38.7 °C (101.7 °F)   TempSrc: Temporal   SpO2: 95%         PERTINENT LABS AND IMAGING     Results for orders placed or performed during the hospital encounter of 25 (from the past 96 hours)   CBC and Auto Differential   Result Value Ref Range    WBC 14.3 (H) 4.4 - 11.3 x10*3/uL    nRBC 0.0 0.0 - 0.0 /100 WBCs    RBC 3.68 (L) 4.00 - 5.20 x10*6/uL    Hemoglobin 10.7 (L) 12.0 - 16.0 g/dL    Hematocrit 31.9 (L) 36.0 - 46.0 %    MCV 87 80 - 100 fL    MCH 29.1 26.0 - 34.0 pg    MCHC 33.5 32.0 - 36.0 g/dL    RDW 13.2 11.5 - 14.5 %    Platelets 129 (L) 150 - 450 x10*3/uL    Neutrophils % 87.5 40.0 - 80.0 %    Immature Granulocytes %, Automated 0.6 0.0 - 0.9 %    Lymphocytes % 3.1 13.0 - 44.0 %    Monocytes % 8.7 2.0 - 10.0 %    Eosinophils % 0.0 0.0 - 6.0 %    Basophils % 0.1 0.0 - 2.0 %    Neutrophils Absolute 12.50 (H) 1.60 - 5.50 x10*3/uL    Immature Granulocytes Absolute, Automated 0.08 0.00 - 0.50 x10*3/uL    Lymphocytes Absolute 0.45 (L) 0.80 - 3.00 x10*3/uL    Monocytes Absolute 1.24 (H) 0.05 - 0.80 x10*3/uL     Eosinophils Absolute 0.00 0.00 - 0.40 x10*3/uL    Basophils Absolute 0.02 0.00 - 0.10 x10*3/uL   Comprehensive Metabolic Panel   Result Value Ref Range    Glucose 142 (H) 74 - 99 mg/dL    Sodium 131 (L) 136 - 145 mmol/L    Potassium 4.1 3.5 - 5.3 mmol/L    Chloride 102 98 - 107 mmol/L    Bicarbonate 21 21 - 32 mmol/L    Anion Gap 12 10 - 20 mmol/L    Urea Nitrogen 28 (H) 6 - 23 mg/dL    Creatinine 1.00 0.50 - 1.05 mg/dL    eGFR 52 (L) >60 mL/min/1.73m*2    Calcium 8.6 8.6 - 10.3 mg/dL    Albumin 3.6 3.4 - 5.0 g/dL    Alkaline Phosphatase 57 33 - 136 U/L    Total Protein 6.1 (L) 6.4 - 8.2 g/dL    AST 31 9 - 39 U/L    Bilirubin, Total 0.9 0.0 - 1.2 mg/dL    ALT 20 7 - 45 U/L   Magnesium   Result Value Ref Range    Magnesium 1.51 (L) 1.60 - 2.40 mg/dL   B-Type Natriuretic Peptide   Result Value Ref Range     (H) 0 - 99 pg/mL   Lactate   Result Value Ref Range    Lactate 2.1 (H) 0.4 - 2.0 mmol/L   Protime-INR   Result Value Ref Range    Protime 11.9 9.8 - 12.4 seconds    INR 1.1 0.9 - 1.1   Sars-CoV-2, Influenza A/B and RSV PCR   Result Value Ref Range    Coronavirus 2019, PCR Not Detected Not Detected    Flu A Result Not Detected Not Detected    Flu B Result Not Detected Not Detected    RSV PCR Not Detected Not Detected   Troponin I, High Sensitivity, Initial   Result Value Ref Range    Troponin I, High Sensitivity 5,657 (HH) 0 - 13 ng/L   Lipid Panel   Result Value Ref Range    Cholesterol 138 0 - 199 mg/dL    HDL-Cholesterol 63.7 mg/dL    Cholesterol/HDL Ratio 2.2     LDL Calculated 59 <=99 mg/dL    VLDL 16 0 - 40 mg/dL    Triglycerides 78 0 - 149 mg/dL    Non HDL Cholesterol 74 0 - 149 mg/dL   ECG 12 lead   Result Value Ref Range    Ventricular Rate 121 BPM    Atrial Rate 121 BPM    VA Interval 144 ms    QRS Duration 72 ms    QT Interval 322 ms    QTC Calculation(Bazett) 457 ms    P Axis 81 degrees    R Axis -65 degrees    T Axis 64 degrees    QRS Count 20 beats    Q Onset 218 ms    P Onset 146 ms    P Offset  214 ms    T Offset 379 ms    QTC Fredericia 406 ms   Troponin, High Sensitivity, 1 Hour   Result Value Ref Range    Troponin I, High Sensitivity 5,893 (HH) 0 - 13 ng/L   Lactate   Result Value Ref Range    Lactate 1.5 0.4 - 2.0 mmol/L   ECG 12 lead   Result Value Ref Range    Ventricular Rate 116 BPM    Atrial Rate 116 BPM    TX Interval 152 ms    QRS Duration 76 ms    QT Interval 342 ms    QTC Calculation(Bazett) 475 ms    P Axis 80 degrees    R Axis 1 degrees    T Axis 69 degrees    QRS Count 19 beats    Q Onset 226 ms    P Onset 150 ms    P Offset 223 ms    T Offset 397 ms    QTC Fredericia 426 ms   aPTT - baseline   Result Value Ref Range    aPTT 31 26 - 36 seconds   Troponin I, High Sensitivity   Result Value Ref Range    Troponin I, High Sensitivity 5,726 (HH) 0 - 13 ng/L   CBC   Result Value Ref Range    WBC 15.7 (H) 4.4 - 11.3 x10*3/uL    nRBC 0.0 0.0 - 0.0 /100 WBCs    RBC 3.48 (L) 4.00 - 5.20 x10*6/uL    Hemoglobin 10.3 (L) 12.0 - 16.0 g/dL    Hematocrit 29.9 (L) 36.0 - 46.0 %    MCV 86 80 - 100 fL    MCH 29.6 26.0 - 34.0 pg    MCHC 34.4 32.0 - 36.0 g/dL    RDW 13.4 11.5 - 14.5 %    Platelets 118 (L) 150 - 450 x10*3/uL   Renal Function Panel   Result Value Ref Range    Glucose 195 (H) 74 - 99 mg/dL    Sodium 131 (L) 136 - 145 mmol/L    Potassium 4.4 3.5 - 5.3 mmol/L    Chloride 102 98 - 107 mmol/L    Bicarbonate 19 (L) 21 - 32 mmol/L    Anion Gap 14 10 - 20 mmol/L    Urea Nitrogen 30 (H) 6 - 23 mg/dL    Creatinine 1.01 0.50 - 1.05 mg/dL    eGFR 51 (L) >60 mL/min/1.73m*2    Calcium 8.3 (L) 8.6 - 10.3 mg/dL    Phosphorus 3.7 2.5 - 4.9 mg/dL    Albumin 3.4 3.4 - 5.0 g/dL   Magnesium   Result Value Ref Range    Magnesium 2.34 1.60 - 2.40 mg/dL   Heparin Assay, UFH   Result Value Ref Range    Heparin Unfractionated 0.5 See Comment Below for Therapeutic Ranges IU/mL   Heparin Assay   Result Value Ref Range    Heparin Unfractionated 0.4 See Comment Below for Therapeutic Ranges IU/mL   Transthoracic Echo Complete    Result Value Ref Range    AV mn grad 11 mmHg    AV pk lynne 2.16 m/s    LV Biplane EF 35 %    LVOT diam 1.70 cm    MV E/A ratio 1.94     Tricuspid annular plane systolic excursion 1.6 cm    LA vol index A/L 52.0 ml/m2    LV EF 33 %    RV free wall pk S' 11.20 cm/s    RVSP 55.4 mmHg    LVIDd 4.34 cm    Aortic Valve Area by Continuity of Peak Velocity 0.92 cm2    AV pk grad 19 mmHg    Aortic Valve Area by Continuity of VTI 0.92 cm2    LV A4C EF 34.1    ECG 12 lead   Result Value Ref Range    Ventricular Rate 91 BPM    Atrial Rate 91 BPM    OH Interval 152 ms    QRS Duration 72 ms    QT Interval 356 ms    QTC Calculation(Bazett) 437 ms    P Axis 87 degrees    R Axis -31 degrees    T Axis -8 degrees    QRS Count 15 beats    Q Onset 224 ms    P Onset 148 ms    P Offset 213 ms    T Offset 402 ms    QTC Fredericia 409 ms   POCT GLUCOSE   Result Value Ref Range    POCT Glucose 181 (H) 74 - 99 mg/dL   CBC   Result Value Ref Range    WBC 14.4 (H) 4.4 - 11.3 x10*3/uL    nRBC 0.0 0.0 - 0.0 /100 WBCs    RBC 3.28 (L) 4.00 - 5.20 x10*6/uL    Hemoglobin 9.5 (L) 12.0 - 16.0 g/dL    Hematocrit 27.6 (L) 36.0 - 46.0 %    MCV 84 80 - 100 fL    MCH 29.0 26.0 - 34.0 pg    MCHC 34.4 32.0 - 36.0 g/dL    RDW 13.5 11.5 - 14.5 %    Platelets 133 (L) 150 - 450 x10*3/uL   Renal Function Panel   Result Value Ref Range    Glucose 128 (H) 74 - 99 mg/dL    Sodium 130 (L) 136 - 145 mmol/L    Potassium 5.2 3.5 - 5.3 mmol/L    Chloride 101 98 - 107 mmol/L    Bicarbonate 17 (L) 21 - 32 mmol/L    Anion Gap 17 10 - 20 mmol/L    Urea Nitrogen 47 (H) 6 - 23 mg/dL    Creatinine 1.64 (H) 0.50 - 1.05 mg/dL    eGFR 29 (L) >60 mL/min/1.73m*2    Calcium 8.3 (L) 8.6 - 10.3 mg/dL    Phosphorus 4.2 2.5 - 4.9 mg/dL    Albumin 3.4 3.4 - 5.0 g/dL   Magnesium   Result Value Ref Range    Magnesium 2.33 1.60 - 2.40 mg/dL   SST TOP   Result Value Ref Range    Extra Tube 293    POCT GLUCOSE   Result Value Ref Range    POCT Glucose 127 (H) 74 - 99 mg/dL        No orders to  display       Transthoracic Echo Complete  Result Date: 5/4/2025          [place] [address]  Tel [phone] Fax [phone] TRANSTHORACIC ECHOCARDIOGRAM REPORT Patient Name:       ROSALINDA DODSON MALINI   Reading Physician:    [id number] Mainsh Heath DO Study Date:         5/3/2025             Ordering Provider:    [id number] VALENTE MARTINS MRN/PID:            [id number]             Fellow: Accession#:         [id number]         Nurse: Date of Birth/Age:  6/2/1928 / 89 years  Sonographer:          Gely Cloud                                                                [facility] Gender Assigned at  F                    Additional Staff: Birth: Height:             167.64 cm            Admit Date:           5/2/2025 Weight:             50.35 kg             Admission Status:     Inpatient -                                                                Routine BSA / BMI:          1.56 m2 / 17.92      Department Location:  [address] [place] [address]                     kg/ Blood Pressure: 106 /58 mmHg Study Type:    TRANSTHORACIC ECHO (TTE) COMPLETE Diagnosis/ICD: Non ST elevation (NSTEMI) myocardial infarction-I21.4 Indication:    NSTEMI CPT Codes:     Echo Complete w Full [id number] Patient History: Smoker:            Former. Valve Disorders:   Aortic Valve Replacement. Pertinent History: HTN, Hyperlipidemia, CAD, CHF, Dyspnea, COPD and TAVR #23                    Sutton(2015). Study Detail: The following Echo studies were performed: 2D, M-Mode, Doppler and               color flow. Definity used as a contrast agent for endocardial               border definition. Total contrast used for this procedure was 3 mL               via IV push.  PHYSICIAN INTERPRETATION: Left Ventricle: The left ventricular systolic function is moderately decreased, with a visually estimated  ejection fraction of 30-35%. Wall motion is abnormal. The left ventricular cavity size is normal. There is mild increased septal and normal posterior left ventricular wall thickness. Spectral Doppler shows a Grade II (pseudonormal pattern) of left ventricular diastolic filling with an elevated left atrial pressure. LV Wall Scoring: The entire apex, mid and apical anterior wall, mid and apical anterior septum, mid and apical inferior septum, mid and apical inferior wall, mid inferolateral segment, and mid anterolateral segment are hypokinetic. Left Atrium: The left atrial size is moderately dilated. Right Ventricle: The right ventricle is normal in size. There is normal right ventricular global systolic function. Right Atrium: The right atrial size is mildly dilated. Aortic Valve: There is a prosthetic aortic valve present. The aortic valve dimensionless index is 0.40. There is an Sutton transcatheter aortic valve prosthesis with a 23 mm reported size. There is mild aortic valve regurgitation. The peak instantaneous gradient of the aortic valve is 19 mmHg. The mean gradient of the aortic valve is 11 mmHg. Mitral Valve: The mitral valve is normal in structure. There is moderate to severe thickening and calcification of the anterior and posterior mitral valve leaflets. There is evidence of moderate mitral valve stenosis. The doppler estimated mean and peak diastolic pressure gradients are 7 mmHg and 18 mmHg respectively. There is normal mitral valve leaflet mobility. There is moderate mitral annular calcification. The peak instantaneous gradient of the mitral valve is 18 mmHg. There is severe mitral valve regurgitation. The mitral regurgitant orifice area is 34 mm2. The mitral regurgitant volume is 44.80 ml. Tricuspid Valve: The tricuspid valve is structurally normal. There is normal tricuspid valve leaflet mobility. There is moderate to severe tricuspid regurgitation. Pulmonic Valve: The pulmonic valve is  structurally normal. There is moderate pulmonic valve regurgitation. Pericardium: Small pericardial effusion. Aorta: The aortic root is normal. Pulmonary Artery: Pulmonary hypertension is present. The tricuspid regurgitant velocity is 3.18 m/s, and with an estimated right atrial pressure of 15 mmHg, the estimated pulmonary artery pressure is moderate to severely elevated with the RVSP at 55.4 mmHg. Systemic Veins: The inferior vena cava appears moderately dilated. In comparison to the previous echocardiogram(s): The left ventricular function is significantly worse.  CONCLUSIONS:  1. The left ventricular systolic function is moderately decreased, with a visually estimated ejection fraction of 30-35%.  2. Multiple segmental abnormalities exist. See findings.  3. Abnormal wall motion.  4. Spectral Doppler shows a Grade II (pseudonormal pattern) of left ventricular diastolic filling with an elevated left atrial pressure.  5. There is normal right ventricular global systolic function.  6. Normal sized right ventricle.  7. The left atrial size is moderately dilated.  8. There is moderate mitral valve stenosis.  9. There is moderate mitral annular calcification. 10. There is moderate to severe thickening and calcification of the anterior and posterior mitral valve leaflets. 11. Severe mitral valve regurgitation. 12. Moderate to severe tricuspid regurgitation. 13. There is an Sutton transcatheter aortic valve prosthesis with a 23 mm reported size. 14. Mild aortic valve regurgitation. 15. Moderate pulmonic valve regurgitation. 16. The inferior vena cava appears moderately dilated. 17. Moderate to severely elevated pulmonary artery pressure. 18. Pulmonary hypertension is present. QUANTITATIVE DATA SUMMARY:  2D MEASUREMENTS:             Normal Ranges: LAs:             4.80 cm     (2.7-4.0cm) IVSd:            1.01 cm     (0.6-1.1cm) LVPWd:           0.86 cm     (0.6-1.1cm) LVIDd:           4.34 cm     (3.9-5.9cm) LVIDs:            3.68 cm LV Mass Index:   84.5 g/m2 LVEDV Index:     66.37 ml/m2 LV % FS          15.2 %  LEFT ATRIUM:                  Normal Ranges: LA Vol A4C:        83.9 ml    (22+/-6mL/m2) LA Vol A2C:        71.3 ml LA Vol BP:         81.0 ml LA Vol Index A4C:  53.9ml/m2 LA Vol Index A2C:  45.8 ml/m2 LA Vol Index BP:   52.0 ml/m2 LA Area A4C:       25.1 cm2 LA Area A2C:       22.1 cm2 LA Major Axis A4C: 6.4 cm LA Major Axis A2C: 5.8 cm LA Volume Index:   48.3 ml/m2  RIGHT ATRIUM:                 Normal Ranges: RA Vol A4C:        32.5 ml    (8.3-19.5ml) RA Vol Index A4C:  20.9 ml/m2 RA Area A4C:       14.1 cm2 RA Major Axis A4C: 5.2 cm  AORTA MEASUREMENTS:         Normal Ranges: Asc Ao, d:          2.73 cm (2.1-3.4cm)  LV SYSTOLIC FUNCTION:                      Normal Ranges: EF-A4C View:    34 % (>=55%) EF-A2C View:    33 % EF-Biplane:     35 % EF-Visual:      33 % LV EF Reported: 33 %  LV DIASTOLIC FUNCTION:           Normal Ranges: MV Peak E:             2.15 m/s  (0.7-1.2 m/s) MV Peak A:             1.11 m/s  (0.42-0.7 m/s) E/A Ratio:             1.94      (1.0-2.2) MV e'                  0.052 m/s (>8.0) MV lateral e'          0.05 m/s MV medial e'           0.05 m/s E/e' Ratio:            41.55     (<8.0)  MITRAL VALVE:           Normal Ranges: MV Vmax:      2.13 m/s  (<=1.3m/s) MV peak P.2 mmHg (<5mmHg) MV mean P.7 mmHg  (<48mmHg) MV DT:        220 msec  (150-240msec)  MITRAL INSUFFICIENCY:             Normal Ranges: PISA Radius:          0.9 cm MR VTI:               131.50 cm MR Vmax:              486.50 cm/s MR Alias Cezar:         30.8 cm/s MR Volume:            44.80 ml MR Flow Rt:           165.73 ml/s MR EROA:              34 mm2  AORTIC VALVE:                      Normal Ranges: AoV Vmax:                2.16 m/s  (<=1.7m/s) AoV Peak P.7 mmHg (<20mmHg) AoV Mean P.0 mmHg (1.7-11.5mmHg) LVOT Max Cezar:            0.88 m/s  (<=1.1m/s) AoV VTI:                 41.80 cm   (18-25cm) LVOT VTI:                16.90 cm LVOT Diameter:           1.70 cm   (1.8-2.4cm) AoV Area, VTI:           0.92 cm2  (2.5-5.5cm2) AoV Area,Vmax:           0.92 cm2  (2.5-4.5cm2) AoV Dimensionless Index: 0.40  AORTIC INSUFFICIENCY: AI Vmax:       2.07 m/s AI Half-time:  197 msec AI Decel Rate: 253.50 cm/s2  RIGHT VENTRICLE: RV Basal 3.52 cm RV Mid   2.37 cm RV Major 6.4 cm TAPSE:   16.0 mm RV s'    0.11 m/s  TRICUSPID VALVE/RVSP:          Normal Ranges: Peak TR Velocity:     3.18 m/s RV Syst Pressure:     55 mmHg  (< 30mmHg) IVC Diam:             2.52 cm  PULMONIC VALVE:          Normal Ranges: PV Accel Time:  98 msec  (>120ms) PV Max Cezar:     0.6 m/s  (0.6-0.9m/s) PV Max P.4 mmHg  [id number] Manish Heath DO Electronically signed on 2025 at 11:02:55 AM  Wall Scoring  ** Final **       DISCHARGE MEDICATIONS        Your medication list        CONTINUE taking these medications        Instructions Last Dose Given Next Dose Due   Advair Diskus 100-50 mcg/dose diskus inhaler  Generic drug: fluticasone propion-salmeteroL           ascorbic acid 500 mg tablet  Commonly known as: Vitamin C           atorvastatin 20 mg tablet  Commonly known as: Lipitor           bisacodyl 5 mg EC tablet  Commonly known as: Dulcolax           cyanocobalamin 1,000 mcg tablet  Commonly known as: Vitamin B-12           levothyroxine 75 mcg tablet  Commonly known as: Synthroid, Levoxyl           losartan 50 mg tablet  Commonly known as: Cozaar      Take 1 tablet (50 mg) by mouth once daily.       Miralax 17 gram/dose powder  Generic drug: polyethylene glycol           multivitamin tablet           nitroglycerin 0.4 mg SL tablet  Commonly known as: Nitrostat      Place 1 tablet (0.4 mg) under the tongue every 5 minutes if needed for chest pain (FOR UP TO 3 DOSES FOR CHEST PAIN. CALL 911 IF PAIN PERSISTS).       pantoprazole 40 mg EC tablet  Commonly known as: ProtoNix      Take 1 tablet (40 mg) by mouth once daily in the  morning. Take before meals. Do not crush, chew, or split.       tiotropium 18 mcg inhalation capsule  Commonly known as: Spiriva           TRELEGY ELLIPTA INHL           Vitamin D3 25 mcg (1,000 units) tablet  Generic drug: cholecalciferol                  STOP taking these medications      ondansetron 4 mg tablet  Commonly known as: Zofran                 OUTPATIENT FOLLOW-UP     No future appointments.

## 2025-05-06 NOTE — SIGNIFICANT EVENT
I was called by nursing staff to pronounce the patient at bedside.    General: Patient does not respond to stimuli  HEENT: Pupils are fixed and dilated, not reactive  Cardiovascular: No heart sounds auscultated  Respiratory: No breath sounds auscultated  Vascular: No carotid artery pulse palpated    Preliminary cause of death: Cardiopulmonary arrest  Date and time of death: 5/5/2025 @ 2230    Patient's family was spoken to at bedside, condolences were provided.      Chente Horowitz MD      ---Of note, this documentation is completed using the Dragon Dictation system (voice recognition software). There may be spelling and/or grammatical errors that were not corrected prior to final submission.---

## 2025-06-18 ENCOUNTER — APPOINTMENT (OUTPATIENT)
Dept: CARDIOLOGY | Facility: CLINIC | Age: OVER 89
End: 2025-06-18
Payer: MEDICARE

## 2025-06-25 ENCOUNTER — APPOINTMENT (OUTPATIENT)
Dept: CARDIOLOGY | Facility: CLINIC | Age: OVER 89
End: 2025-06-25
Payer: MEDICARE